# Patient Record
Sex: FEMALE | Race: WHITE | Employment: OTHER | URBAN - METROPOLITAN AREA
[De-identification: names, ages, dates, MRNs, and addresses within clinical notes are randomized per-mention and may not be internally consistent; named-entity substitution may affect disease eponyms.]

---

## 2017-11-28 ENCOUNTER — OFFICE VISIT (OUTPATIENT)
Dept: FAMILY MEDICINE CLINIC | Age: 82
End: 2017-11-28

## 2017-11-28 ENCOUNTER — HOSPITAL ENCOUNTER (OUTPATIENT)
Dept: LAB | Age: 82
Discharge: HOME | End: 2017-11-28
Payer: MEDICARE

## 2017-11-28 VITALS
OXYGEN SATURATION: 97 % | RESPIRATION RATE: 16 BRPM | WEIGHT: 153 LBS | HEART RATE: 64 BPM | HEIGHT: 65 IN | BODY MASS INDEX: 25.49 KG/M2 | DIASTOLIC BLOOD PRESSURE: 80 MMHG | SYSTOLIC BLOOD PRESSURE: 154 MMHG | TEMPERATURE: 97.9 F

## 2017-11-28 DIAGNOSIS — N32.81 OAB (OVERACTIVE BLADDER): ICD-10-CM

## 2017-11-28 DIAGNOSIS — R26.89 BALANCE PROBLEM: Primary | ICD-10-CM

## 2017-11-28 DIAGNOSIS — G89.29 CHRONIC FOOT PAIN, RIGHT: ICD-10-CM

## 2017-11-28 DIAGNOSIS — L43.8 ORAL LICHEN PLANUS: ICD-10-CM

## 2017-11-28 DIAGNOSIS — I10 ESSENTIAL HYPERTENSION: ICD-10-CM

## 2017-11-28 DIAGNOSIS — E03.9 ACQUIRED HYPOTHYROIDISM: ICD-10-CM

## 2017-11-28 DIAGNOSIS — M79.671 CHRONIC FOOT PAIN, RIGHT: ICD-10-CM

## 2017-11-28 DIAGNOSIS — F10.10 EXCESSIVE DRINKING ALCOHOL: ICD-10-CM

## 2017-11-28 DIAGNOSIS — B02.22 NEUROPATHIC POSTHERPETIC TRIGEMINAL NEURALGIA: ICD-10-CM

## 2017-11-28 DIAGNOSIS — M25.551 RIGHT HIP PAIN: ICD-10-CM

## 2017-11-28 DIAGNOSIS — Z79.890 POSTMENOPAUSAL HRT (HORMONE REPLACEMENT THERAPY): ICD-10-CM

## 2017-11-28 PROBLEM — G50.0 LEFT-SIDED TRIGEMINAL NEURALGIA: Status: ACTIVE | Noted: 2017-11-28

## 2017-11-28 PROBLEM — G50.0 LEFT-SIDED TRIGEMINAL NEURALGIA: Status: RESOLVED | Noted: 2017-11-28 | Resolved: 2017-11-28

## 2017-11-28 PROCEDURE — 84443 ASSAY THYROID STIM HORMONE: CPT

## 2017-11-28 PROCEDURE — 36415 COLL VENOUS BLD VENIPUNCTURE: CPT

## 2017-11-28 PROCEDURE — 80053 COMPREHEN METABOLIC PANEL: CPT

## 2017-11-28 PROCEDURE — 85025 COMPLETE CBC W/AUTO DIFF WBC: CPT

## 2017-11-28 PROCEDURE — 80061 LIPID PANEL: CPT

## 2017-11-28 RX ORDER — DOXYCYCLINE 100 MG/1
100 TABLET ORAL 2 TIMES DAILY
COMMUNITY
End: 2021-07-08

## 2017-11-28 RX ORDER — ASPIRIN 81 MG/1
TABLET ORAL DAILY
COMMUNITY
End: 2019-09-05

## 2017-11-28 RX ORDER — OXYBUTYNIN CHLORIDE 5 MG/1
5 TABLET ORAL
COMMUNITY
Start: 2017-11-28 | End: 2018-07-19 | Stop reason: SDUPTHER

## 2017-11-28 RX ORDER — LEVOTHYROXINE SODIUM 75 UG/1
TABLET ORAL
COMMUNITY
End: 2018-07-19 | Stop reason: SDUPTHER

## 2017-11-28 RX ORDER — OXYBUTYNIN CHLORIDE 5 MG/1
5 TABLET ORAL DAILY
COMMUNITY
End: 2017-11-28 | Stop reason: SDUPTHER

## 2017-11-28 RX ORDER — PROGESTERONE 200 MG/1
200 CAPSULE ORAL DAILY
COMMUNITY
End: 2018-05-16 | Stop reason: SDUPTHER

## 2017-11-28 RX ORDER — LISINOPRIL 2.5 MG/1
2.5 TABLET ORAL DAILY
COMMUNITY
End: 2018-07-19 | Stop reason: SDUPTHER

## 2017-11-28 RX ORDER — GABAPENTIN 300 MG/1
300 CAPSULE ORAL 3 TIMES DAILY
COMMUNITY
End: 2017-11-28 | Stop reason: DRUGHIGH

## 2017-11-28 RX ORDER — GABAPENTIN 100 MG/1
200 CAPSULE ORAL 3 TIMES DAILY
Qty: 720 CAP | Refills: 1 | Status: SHIPPED | OUTPATIENT
Start: 2017-11-28 | End: 2018-07-19 | Stop reason: SDUPTHER

## 2017-11-28 NOTE — PROGRESS NOTES
Jayme Treviño [address] [facility]  [address] [place], [address]  [phone]             Date of visit: 11/28/17   Subjective:      History obtained from:  Patient, daughter, Reggie Scott is a 80 y.o. female who presents today for new patient, just moved from [place]. [facility]. Brought in a sheet with a few concerns, although none of them new.  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, lidex and doxy work in 4 days    Trigeminal neuralgia, left side for 16-18 years, tolerable with gabapentin  Came from cold sore infection, not from in her head  Had been on lyrica made her groggy  Had been on neurontin    On hormones since menopause  Mother had hot flashes since age 76    Thyroid med for 12 years    On ditropan for OAB, has not tried taking it prn so not sure how that would go  Works well taking it once daily    prema lists of BP's and consistently 110-120s/50-60s at home  Always runs higher in doctors office      Patient Active Problem List    Diagnosis Date Noted    OAB (overactive bladder) 11/29/2017    Excessive drinking alcohol 11/29/2017    Postmenopausal HRT (hormone replacement therapy) 46/86/6152    Oral lichen planus 80/97/7844    Balance problem 11/28/2017    Right hip pain 11/28/2017    Chronic foot pain, right 11/28/2017    Neuropathic postherpetic trigeminal neuralgia 11/28/2017    Acquired hypothyroidism 11/28/2017    Essential hypertension 11/28/2017     Current Outpatient Prescriptions   Medication Sig Dispense Refill    levothyroxine (UNITHROID) 75 mcg tablet Take  by mouth Daily (before breakfast).  doxycycline (ADOXA) 100 mg tablet Take 100 mg by mouth two (2) times a day. AS NEEDED      lisinopril (PRINIVIL, ZESTRIL) 2.5 mg tablet Take 2.5 mg by mouth daily.  progesterone (PROMETRIUM) 200 mg capsule Take 200 mg by mouth daily.  conjugated estrogens (PREMARIN) 0.625 mg tablet Take 0.625 mg by mouth daily.  aspirin delayed-release 81 mg tablet Take  by mouth daily.  CALCIUM CARB/VIT D2/MINERALS (CALTRATE PLUS PO) Take 500 mg by mouth daily.  Omega-3 Fatty Acids (FISH OIL) 500 mg cap Take  by mouth.  gabapentin (NEURONTIN) 100 mg capsule Take 2 Caps by mouth three (3) times daily. Lower dose 720 Cap 1    oxybutynin (DITROPAN) 5 mg tablet Take 1 Tab by mouth daily as needed.        Allergies   Allergen Reactions    Voltaren [Diclofenac Sodium] Anaphylaxis    Toole Faywood Other (comments)    Neosporin [Benzalkonium Chloride] Rash     Past Medical History:   Diagnosis Date    Eczema     Hypertension 6145    Lichen planus     Oral    Trigeminal neuralgia      Past Surgical History:   Procedure Laterality Date    HX KNEE REPLACEMENT Right 2014    HX ROTATOR CUFF REPAIR Right      Family History   Problem Relation Age of Onset    Other Mother      severe hot flashes    Other Daughter      severe hot flashes     Social History   Substance Use Topics    Smoking status: Former Smoker    Smokeless tobacco: Never Used    Alcohol use 8.4 oz/week     8 Glasses of wine, 6 Shots of liquor per week      Social History     Social History Narrative    Lives in [facility], independent living    Daughter Quinton Roy lives nearby        Review of Systems  Card: denies chest pain  Pulm: denies shortness of breath   GI: denies hematochezia   denies urinary pain  Neuro: denies headaches  ENT denies congestion  MSK: admits to some joint problems, as above  Gen: denies fevers  Skin: denies rashes  Psych: denies depression/anxiety       Objective:     Vitals:    11/28/17 1513   BP: 154/80   Pulse: 64   Resp: 16   Temp: 97.9 °F (36.6 °C)   TempSrc: Oral   SpO2: 97%   Weight: 153 lb (69.4 kg)   Height: 5' 5.4\" (1.661 m)     Body mass index is 25.15 kg/(m^2). General: stated age, well-developed, and in NAD  Eyes: PERRL, EOMI, no redness or drainage  Nose: no drainage  Mouth: no lesions  Throat: no erythema, exudate or swelling  Neck: supple, symmetrical, trachea midline, no adenopathy and thyroid: not enlarged, symmetric, no tenderness/mass/nodules  Lungs:  clear to auscultation w/o rales, rhonchi, wheezes w/normal effort and no use of accessory muscles of respiration   Heart: regular rate and rhythm, S1, S2 normal, no murmur, click, rub or gallop  Abdomen: soft, nontender, no masses  Ext:  No edema noted. MSK: some difficulty standing and getting up on exam table, walks with a limp but able to do it all independently  Lymph: no cervical adenopathy appreciated  Skin:  Normal. and no rash or abnormalities   Neuro: CN 2-12 intact  Psych: alert and oriented to person, place, time and situation and Speech: appropriate quality, quantity and organization of sentences, normal affect    Assessment/Plan:       ICD-10-CM ICD-9-CM    1. Balance problem R26.89 781.99 REFERRAL TO PHYSICAL THERAPY   2. Oral lichen planus K46.3 953.6    3. Essential hypertension I10 401.9 CBC WITH AUTOMATED DIFF      METABOLIC PANEL, COMPREHENSIVE      LIPID PANEL   4. OAB (overactive bladder) N32.81 596.51    5. Acquired hypothyroidism E03.9 244.9 TSH 3RD GENERATION   6. Right hip pain M25.551 719.45 REFERRAL TO PHYSICAL THERAPY   7. Chronic foot pain, right M79.671 729.5 REFERRAL TO PHYSICAL THERAPY    G89.29 338.29    8. Neuropathic postherpetic trigeminal neuralgia B02.22 053.12    9. Excessive drinking alcohol F10.10 305.00    10.  Postmenopausal HRT (hormone replacement therapy) Z79.890 V07.4         Orders Placed This Encounter    TSH 3RD GENERATION    CBC WITH AUTOMATED DIFF    METABOLIC PANEL, COMPREHENSIVE    LIPID PANEL    REFERRAL TO PHYSICAL THERAPY    levothyroxine (UNITHROID) 75 mcg tablet    doxycycline (ADOXA) 100 mg tablet    DISCONTD: gabapentin (NEURONTIN) 300 mg capsule    DISCONTD: oxybutynin (DITROPAN) 5 mg tablet    lisinopril (PRINIVIL, ZESTRIL) 2.5 mg tablet    progesterone (PROMETRIUM) 200 mg capsule    conjugated estrogens (PREMARIN) 0.625 mg tablet    aspirin delayed-release 81 mg tablet    CALCIUM CARB/VIT D2/MINERALS (CALTRATE PLUS PO)    Omega-3 Fatty Acids (FISH OIL) 500 mg cap    gabapentin (NEURONTIN) 100 mg capsule    oxybutynin (DITROPAN) 5 mg tablet       Will refer for PT she can do where she lives for the balance problem and few chronic pains. Did not see until she left that she drinks about 2 drinks per day--definitely too much for a woman and francis at her age. Will address more after seeing labs. Might be affecting her balance. Has seen neuro about the facial pains which are doing well on neurontin  Will try lower dose to see if that also helps her balance  Also try using ditropan only prn instead of daily and see how that goes  May consider weaning hormones next time; she was nervous about doing that but has not tried in many years. Discussed their risks. Stable thyroid dose but check labs  Oral lichen planus does well with prn meds lidex and doxycycline, not having exacerbation now. Will request records sent  bp high here but perfect at home, no med changes    Discussed the diagnosis and plan and she expressed understanding. Follow-up Disposition:  Return in about 6 months (around 5/28/2018) for Follow up.     Daniel Munoz MD

## 2017-11-28 NOTE — PATIENT INSTRUCTIONS
You can use the oxybutinin AS NEEDED when you are going out  Be aware it can cause dry mouth, dizziness, or confusion    Let's cut your gabapentin down to 200mg 3x daily to lower your risks of falls                 Low Sodium Diet (2,000 Milligram): Care Instructions  Your Care Instructions    Too much sodium causes your body to hold on to extra water. This can raise your blood pressure and force your heart and kidneys to work harder. In very serious cases, this could cause you to be put in the hospital. It might even be life-threatening. By limiting sodium, you will feel better and lower your risk of serious problems. The most common source of sodium is salt. People get most of the salt in their diet from canned, prepared, and packaged foods. Fast food and restaurant meals also are very high in sodium. Your doctor will probably limit your sodium to less than 2,000 milligrams (mg) a day. This limit counts all the sodium in prepared and packaged foods and any salt you add to your food. Follow-up care is a key part of your treatment and safety. Be sure to make and go to all appointments, and call your doctor if you are having problems. It's also a good idea to know your test results and keep a list of the medicines you take. How can you care for yourself at home? Read food labels  · Read labels on cans and food packages. The labels tell you how much sodium is in each serving. Make sure that you look at the serving size. If you eat more than the serving size, you have eaten more sodium. · Food labels also tell you the Percent Daily Value for sodium. Choose products with low Percent Daily Values for sodium. · Be aware that sodium can come in forms other than salt, including monosodium glutamate (MSG), sodium citrate, and sodium bicarbonate (baking soda). MSG is often added to Asian food. When you eat out, you can sometimes ask for food without MSG or added salt.   Buy low-sodium foods  · Buy foods that are labeled \"unsalted\" (no salt added), \"sodium-free\" (less than 5 mg of sodium per serving), or \"low-sodium\" (less than 140 mg of sodium per serving). Foods labeled \"reduced-sodium\" and \"light sodium\" may still have too much sodium. Be sure to read the label to see how much sodium you are getting. · Buy fresh vegetables, or frozen vegetables without added sauces. Buy low-sodium versions of canned vegetables, soups, and other canned goods. Prepare low-sodium meals  · Cut back on the amount of salt you use in cooking. This will help you adjust to the taste. Do not add salt after cooking. One teaspoon of salt has about 2,300 mg of sodium. · Take the salt shaker off the table. · Flavor your food with garlic, lemon juice, onion, vinegar, herbs, and spices. Do not use soy sauce, lite soy sauce, steak sauce, onion salt, garlic salt, celery salt, mustard, or ketchup on your food. · Use low-sodium salad dressings, sauces, and ketchup. Or make your own salad dressings and sauces without adding salt. · Use less salt (or none) when recipes call for it. You can often use half the salt a recipe calls for without losing flavor. Other foods such as rice, pasta, and grains do not need added salt. · Rinse canned vegetables, and cook them in fresh water. This removes some-but not all-of the salt. · Avoid water that is naturally high in sodium or that has been treated with water softeners, which add sodium. Call your local water company to find out the sodium content of your water supply. If you buy bottled water, read the label and choose a sodium-free brand. Avoid high-sodium foods  · Avoid eating:  ¨ Smoked, cured, salted, and canned meat, fish, and poultry. ¨ Ham, dejesus, hot dogs, and luncheon meats. ¨ Regular, hard, and processed cheese and regular peanut butter. ¨ Crackers with salted tops, and other salted snack foods such as pretzels, chips, and salted popcorn. ¨ Frozen prepared meals, unless labeled low-sodium.   ¨ Canned and dried soups, broths, and bouillon, unless labeled sodium-free or low-sodium. ¨ Canned vegetables, unless labeled sodium-free or low-sodium. ¨ Elizabeth Jean Pierre fries, pizza, tacos, and other fast foods. ¨ Pickles, olives, ketchup, and other condiments, especially soy sauce, unless labeled sodium-free or low-sodium. Where can you learn more? Go to http://dez-cedric.info/. Enter P466 in the search box to learn more about \"Low Sodium Diet (2,000 Milligram): Care Instructions. \"  Current as of: May 12, 2017  Content Version: 11.4  © 3808-2127 Healthwise, [employer]. Care instructions adapted under license by [employer] (which disclaims liability or warranty for this information). If you have questions about a medical condition or this instruction, always ask your healthcare professional. [place] [address] any warranty or liability for your use of this information.

## 2017-11-28 NOTE — MR AVS SNAPSHOT
Visit Information Date & Time Provider Department Dept. Phone Encounter #  
 11/28/2017  3:00 PM Alex Bourgeois, [facility] [phone] [id number] Follow-up Instructions Return in about 6 months (around 5/28/2018). Upcoming Health Maintenance Date Due DTaP/Tdap/Td series (1 - Tdap) 5/2/1950 ZOSTER VACCINE AGE 60> 3/2/1989 GLAUCOMA SCREENING Q2Y 5/2/1994 OSTEOPOROSIS SCREENING (DEXA) 5/2/1994 Pneumococcal 65+ Low/Medium Risk (1 of 2 - PCV13) 5/2/1994 MEDICARE YEARLY EXAM 5/2/1994 Influenza Age 5 to Adult 8/1/2017 Allergies as of 11/28/2017  Review Complete On: 11/28/2017 By: Alex Bourgeois MD  
  
 Severity Noted Reaction Type Reactions Voltaren [Diclofenac Sodium] High 11/28/2017   Side Effect Anaphylaxis [place]  11/28/2017    Other (comments) Neosporin [Benzalkonium Chloride]  11/28/2017   Topical Rash Current Immunizations  Never Reviewed Name Date HPV 9/18/2017 Not reviewed this visit You Were Diagnosed With   
  
 Codes Comments Balance problem    -  Primary ICD-10-CM: R26.89 
ICD-9-CM: 781.99 Oral lichen planus     [facility]-12-HU: L43.8 ICD-9-CM: 697.0 Right hip pain     ICD-10-CM: M25.551 ICD-9-CM: 719.45 Chronic foot pain, right     ICD-10-CM: M79.671, G89.29 ICD-9-CM: 729.5, 338.29 Neuropathic postherpetic trigeminal neuralgia     ICD-10-CM: B02.22 
ICD-9-CM: 053.12 Acquired hypothyroidism     ICD-10-CM: E03.9 ICD-9-CM: 244.9 Essential hypertension     ICD-10-CM: I10 
ICD-9-CM: 401.9 Vitals BP Pulse Temp Resp Height(growth percentile) Weight(growth percentile) 154/80 64 97.9 °F (36.6 °C) (Oral) 16 5' 5.4\" (1.661 m) 153 lb (69.4 kg) SpO2 BMI Smoking Status 97% 25.15 kg/m2 Former Smoker Vitals History BMI and BSA Data Body Mass Index Body Surface Area  
 25.15 kg/m 2 1.79 m 2 Preferred Pharmacy Pharmacy Name [facility]  - [facility] - [phone], [phone] Your Updated Medication List  
  
   
This list is accurate as of: 11/28/17  4:26 PM.  Always use your most recent med list.  
  
  
  
  
 aspirin delayed-release 81 mg tablet Take  by mouth daily. CALTRATE PLUS PO Take 500 mg by mouth daily. conjugated estrogens 0.625 mg tablet Commonly known as:  PREMARIN Take 0.625 mg by mouth daily. doxycycline 100 mg tablet Commonly known as:  ADOXA Take 100 mg by mouth two (2) times a day. AS NEEDED FISH  mg Cap Generic drug:  Omega-3 Fatty Acids Take  by mouth.  
  
 gabapentin 300 mg capsule Commonly known as:  NEURONTIN Take 300 mg by mouth three (3) times daily. lisinopril 2.5 mg tablet Commonly known as:  Anjana Jhony Take 2.5 mg by mouth daily. oxybutynin 5 mg tablet Commonly known as:  BRCBLXCE Take 5 mg by mouth daily. PROMETRIUM 200 mg capsule Generic drug:  progesterone Take 200 mg by mouth daily. UNITHROID 75 mcg tablet Generic drug:  levothyroxine Take  by mouth Daily (before breakfast). We Performed the Following CBC WITH AUTOMATED DIFF [84264 CPT(R)] LIPID PANEL [33873 CPT(R)] METABOLIC PANEL, COMPREHENSIVE [93317 CPT(R)] REFERRAL TO PHYSICAL THERAPY [GZN36 Custom] Comments:  
 Has physical therapy Vipin at [facility] where she lives ([facility]) TSH 3RD GENERATION [01553 CPT(R)] Follow-up Instructions Return in about 6 months (around 5/28/2018). Referral Information Referral ID Referred By Referred To  
  
 [id number] Shaniqua Singer Not Available Visits Status Start Date End Date 1 New Request 11/28/17 11/28/18 If your referral has a status of pending review or denied, additional information will be sent to support the outcome of this decision. Patient Instructions You can use the oxybutinin AS NEEDED when you are going out Be aware it can cause dry mouth, dizziness, or confusion Let's cut your gabapentin down to 200mg 3x daily to lower your risks of falls Low Sodium Diet (2,000 Milligram): Care Instructions Your Care Instructions Too much sodium causes your body to hold on to extra water. This can raise your blood pressure and force your heart and kidneys to work harder. In very serious cases, this could cause you to be put in the hospital. It might even be life-threatening. By limiting sodium, you will feel better and lower your risk of serious problems. The most common source of sodium is salt. People get most of the salt in their diet from canned, prepared, and packaged foods. Fast food and restaurant meals also are very high in sodium. Your doctor will probably limit your sodium to less than 2,000 milligrams (mg) a day. This limit counts all the sodium in prepared and packaged foods and any salt you add to your food. Follow-up care is a key part of your treatment and safety. Be sure to make and go to all appointments, and call your doctor if you are having problems. It's also a good idea to know your test results and keep a list of the medicines you take. How can you care for yourself at home? Read food labels · Read labels on cans and food packages. The labels tell you how much sodium is in each serving. Make sure that you look at the serving size. If you eat more than the serving size, you have eaten more sodium. · Food labels also tell you the Percent Daily Value for sodium. Choose products with low Percent Daily Values for sodium. · Be aware that sodium can come in forms other than salt, including monosodium glutamate (MSG), sodium citrate, and sodium bicarbonate (baking soda). MSG is often added to Asian food. When you eat out, you can sometimes ask for food without MSG or added salt. Buy low-sodium foods · Buy foods that are labeled \"unsalted\" (no salt added), \"sodium-free\" (less than 5 mg of sodium per serving), or \"low-sodium\" (less than 140 mg of sodium per serving). Foods labeled \"reduced-sodium\" and \"light sodium\" may still have too much sodium. Be sure to read the label to see how much sodium you are getting. · Buy fresh vegetables, or frozen vegetables without added sauces. Buy low-sodium versions of canned vegetables, soups, and other canned goods. Prepare low-sodium meals · Cut back on the amount of salt you use in cooking. This will help you adjust to the taste. Do not add salt after cooking. One teaspoon of salt has about 2,300 mg of sodium. · Take the salt shaker off the table. · Flavor your food with garlic, lemon juice, onion, vinegar, herbs, and spices. Do not use soy sauce, lite soy sauce, steak sauce, onion salt, garlic salt, celery salt, mustard, or ketchup on your food. · Use low-sodium salad dressings, sauces, and ketchup. Or make your own salad dressings and sauces without adding salt. · Use less salt (or none) when recipes call for it. You can often use half the salt a recipe calls for without losing flavor. Other foods such as rice, pasta, and grains do not need added salt. · Rinse canned vegetables, and cook them in fresh water. This removes some-but not all-of the salt. · Avoid water that is naturally high in sodium or that has been treated with water softeners, which add sodium. Call your local water company to find out the sodium content of your water supply. If you buy bottled water, read the label and choose a sodium-free brand. Avoid high-sodium foods · Avoid eating: ¨ Smoked, cured, salted, and canned meat, fish, and poultry. ¨ Ham, dejesus, hot dogs, and luncheon meats. ¨ Regular, hard, and processed cheese and regular peanut butter. ¨ Crackers with salted tops, and other salted snack foods such as pretzels, chips, and salted popcorn. ¨ Frozen prepared meals, unless labeled low-sodium. ¨ Canned and dried soups, broths, and bouillon, unless labeled sodium-free or low-sodium. ¨ Canned vegetables, unless labeled sodium-free or low-sodium. ¨ [place] fries, pizza, tacos, and other fast foods. ¨ Pickles, olives, ketchup, and other condiments, especially soy sauce, unless labeled sodium-free or low-sodium. Where can you learn more? Go to http://dez-cerdic.info/. Enter U802 in the search box to learn more about \"Low Sodium Diet (2,000 Milligram): Care Instructions. \" Current as of: May 12, 2017 Content Version: 11.4 © 5627-7732 [employer]. Care instructions adapted under license by [employer] (which disclaims liability or warranty for this information). If you have questions about a medical condition or this instruction, always ask your healthcare professional. [place] [address] any warranty or liability for your use of this information. Introducing [facility]! Kristine Carbajal introduces Medityplus patient portal. Now you can access parts of your medical record, email your doctor's office, and request medication refills online. 1. In your internet browser, go to [email or web address] 2. Click on the First Time User? Click Here link in the Sign In box. You will see the New Member Sign Up page. 3. Enter your Medityplus Access Code exactly as it appears below. You will not need to use this code after youve completed the sign-up process. If you do not sign up before the expiration date, you must request a new code. · Medityplus Access Code: [email or web address] Expires: 2/26/2018  3:38 PM 
 
4. Enter the last four digits of your Social Security Number (xxxx) and Date of Birth (mm/dd/yyyy) as indicated and click Submit. You will be taken to the next sign-up page. 5. Create a Medityplus ID.  This will be your Medityplus login ID and cannot be changed, so think of one that is secure and easy to remember. 6. Create a Popset password. You can change your password at any time. 7. Enter your Password Reset Question and Answer. This can be used at a later time if you forget your password. 8. Enter your e-mail address. You will receive e-mail notification when new information is available in [address] 9. Click Sign Up. You can now view and download portions of your medical record. 10. Click the Download Summary menu link to download a portable copy of your medical information. If you have questions, please visit the Frequently Asked Questions section of the Popset website. Remember, Popset is NOT to be used for urgent needs. For medical emergencies, dial 911. Now available from your iPhone and Android! Please provide this summary of care documentation to your next provider. Your primary care clinician is listed as Krystle Larose. If you have any questions after today's visit, please call [phone].

## 2017-11-28 NOTE — LETTER
11/28/2017 4:31 PM 
 
Ms. Norma Parker [address] [place] [id number] Current Outpatient Prescriptions:  
  levothyroxine (UNITHROID) 75 mcg tablet, Take  by mouth Daily (before breakfast). , Disp: , Rfl:  
  doxycycline (ADOXA) 100 mg tablet, Take 100 mg by mouth two (2) times a day. AS NEEDED, Disp: , Rfl:  
  lisinopril (PRINIVIL, ZESTRIL) 2.5 mg tablet, Take 2.5 mg by mouth daily. , Disp: , Rfl:  
  progesterone (PROMETRIUM) 200 mg capsule, Take 200 mg by mouth daily. , Disp: , Rfl:  
  conjugated estrogens (PREMARIN) 0.625 mg tablet, Take 0.625 mg by mouth daily. , Disp: , Rfl:  
  aspirin delayed-release 81 mg tablet, Take  by mouth daily. , Disp: , Rfl:  
  CALCIUM CARB/VIT D2/MINERALS (CALTRATE PLUS PO), Take 500 mg by mouth daily. , Disp: , Rfl:  
  Omega-3 Fatty Acids (FISH OIL) 500 mg cap, Take  by mouth., Disp: , Rfl:  
  gabapentin (NEURONTIN) 100 mg capsule, Take 2 Caps by mouth three (3) times daily. Lower dose, Disp: 720 Cap, Rfl: 1 
  oxybutynin (DITROPAN) 5 mg tablet, Take 1 Tab by mouth daily as needed. , Disp: , Rfl:  
 
 
 
Sincerely, Renée Person MD

## 2017-11-28 NOTE — PROGRESS NOTES
Pt presents to office today with her daughter Nicolasa Sheehan to establish care. Pt new to area. Chief Complaint   Patient presents with   [employer] Establish Care     Pt new to area. 1. Have you been to the ER, urgent care clinic since your last visit? Hospitalized since your last visit? No    2. Have you seen or consulted any other health care providers outside of the [address] [place] [address] since your last visit? Include any pap smears or colon screening.  No

## 2017-11-29 PROBLEM — N32.81 OAB (OVERACTIVE BLADDER): Status: ACTIVE | Noted: 2017-11-29

## 2017-11-29 PROBLEM — Z79.890 POSTMENOPAUSAL HRT (HORMONE REPLACEMENT THERAPY): Status: ACTIVE | Noted: 2017-11-29

## 2017-11-29 PROBLEM — F10.10 EXCESSIVE DRINKING ALCOHOL: Status: ACTIVE | Noted: 2017-11-29

## 2017-11-29 LAB
ALBUMIN SERPL-MCNC: 4.1 G/DL (ref 3.5–4.7)
ALBUMIN/GLOB SERPL: 1.5 {RATIO} (ref 1.2–2.2)
ALP SERPL-CCNC: 76 IU/L (ref 39–117)
ALT SERPL-CCNC: 10 IU/L (ref 0–32)
AST SERPL-CCNC: 15 IU/L (ref 0–40)
BASOPHILS # BLD AUTO: 0.1 X10E3/UL (ref 0–0.2)
BASOPHILS NFR BLD AUTO: 1 %
BILIRUB SERPL-MCNC: 0.3 MG/DL (ref 0–1.2)
BUN SERPL-MCNC: 19 MG/DL (ref 8–27)
BUN/CREAT SERPL: 18 (ref 12–28)
CALCIUM SERPL-MCNC: 9.7 MG/DL (ref 8.7–10.3)
CHLORIDE SERPL-SCNC: 102 MMOL/L (ref 96–106)
CHOLEST SERPL-MCNC: 279 MG/DL (ref 100–199)
CO2 SERPL-SCNC: 21 MMOL/L (ref 18–29)
CREAT SERPL-MCNC: 1.08 MG/DL (ref 0.57–1)
EOSINOPHIL # BLD AUTO: 0.2 X10E3/UL (ref 0–0.4)
EOSINOPHIL NFR BLD AUTO: 2 %
ERYTHROCYTE [DISTWIDTH] IN BLOOD BY AUTOMATED COUNT: 13.9 % (ref 12.3–15.4)
GFR SERPLBLD CREATININE-BSD FMLA CKD-EPI: 46 ML/MIN/1.73
GFR SERPLBLD CREATININE-BSD FMLA CKD-EPI: 53 ML/MIN/1.73
GLOBULIN SER CALC-MCNC: 2.7 G/DL (ref 1.5–4.5)
GLUCOSE SERPL-MCNC: 80 MG/DL (ref 65–99)
HCT VFR BLD AUTO: 41.4 % (ref 34–46.6)
HDLC SERPL-MCNC: 99 MG/DL
HGB BLD-MCNC: 14.1 G/DL (ref 11.1–15.9)
IMM GRANULOCYTES # BLD: 0 X10E3/UL (ref 0–0.1)
IMM GRANULOCYTES NFR BLD: 0 %
INTERPRETATION, 910389: NORMAL
INTERPRETATION: NORMAL
LDLC SERPL CALC-MCNC: 152 MG/DL (ref 0–99)
LYMPHOCYTES # BLD AUTO: 1.4 X10E3/UL (ref 0.7–3.1)
LYMPHOCYTES NFR BLD AUTO: 20 %
MCH RBC QN AUTO: 30.8 PG (ref 26.6–33)
MCHC RBC AUTO-ENTMCNC: 34.1 G/DL (ref 31.5–35.7)
MCV RBC AUTO: 90 FL (ref 79–97)
MONOCYTES # BLD AUTO: 0.6 X10E3/UL (ref 0.1–0.9)
MONOCYTES NFR BLD AUTO: 9 %
NEUTROPHILS # BLD AUTO: 4.8 X10E3/UL (ref 1.4–7)
NEUTROPHILS NFR BLD AUTO: 68 %
PDF IMAGE, 910387: NORMAL
PLATELET # BLD AUTO: 304 X10E3/UL (ref 150–379)
POTASSIUM SERPL-SCNC: 4.5 MMOL/L (ref 3.5–5.2)
PROT SERPL-MCNC: 6.8 G/DL (ref 6–8.5)
RBC # BLD AUTO: 4.58 X10E6/UL (ref 3.77–5.28)
SODIUM SERPL-SCNC: 140 MMOL/L (ref 134–144)
TRIGL SERPL-MCNC: 138 MG/DL (ref 0–149)
TSH SERPL DL<=0.005 MIU/L-ACNC: 0.8 UIU/ML (ref 0.45–4.5)
VLDLC SERPL CALC-MCNC: 28 MG/DL (ref 5–40)
WBC # BLD AUTO: 7 X10E3/UL (ref 3.4–10.8)

## 2017-11-29 NOTE — PROGRESS NOTES
Sent in letter: Your blood tests were great, including thyroid, liver, electrolytes, and blood counts. Your kidney lab (creatinine) is slightly elevated but not bad for your age. I suspect it has been this way for a while but will compare it to your older labs when the records are sent. I noticed after you left that my nurse recorded you drank about 14 alcoholic drinks per week. I would suggest you cut back to perhaps 1/2 drink per day, since you are having some problems with your balance. Also, more than 7 drinks per week is too much for your liver. Just a suggestion! You are doing very well overall! !!

## 2017-11-30 ENCOUNTER — TELEPHONE (OUTPATIENT)
Dept: FAMILY MEDICINE CLINIC | Age: 82
End: 2017-11-30

## 2017-11-30 NOTE — TELEPHONE ENCOUNTER
Clarence Ferrara calling from [facility] wanted to notify Dr. Essie Rainey that there is a delay at patients request regarding appt.      Best phone [phone]  11/30/17  Heriberto Benton

## 2017-12-20 ENCOUNTER — TELEPHONE (OUTPATIENT)
Dept: FAMILY MEDICINE CLINIC | Age: 82
End: 2017-12-20

## 2017-12-20 NOTE — TELEPHONE ENCOUNTER
ALEA for return call. Kaleigh Friday called back and states that they still need to work a little longer on pt's strength and gait. Advised that it is ok to extend PT as needed.

## 2017-12-20 NOTE — TELEPHONE ENCOUNTER
----- Message from Godwin Hanks sent at 12/20/2017 10:34 AM EST -----  Regarding: Dr. Tristan Ray from [place] [address] a physical therapist is requesting a verbal order to continue physical therapy. His number [phone]. The patient's number is [phone].

## 2018-01-03 ENCOUNTER — TELEPHONE (OUTPATIENT)
Dept: FAMILY MEDICINE CLINIC | Age: 83
End: 2018-01-03

## 2018-01-03 NOTE — TELEPHONE ENCOUNTER
----- Message from Candace Hsieh sent at 1/3/2018  3:13 PM EST -----  Regarding: Dr. Shree Camilo / telephone  [facility] with [facility] health  is requesting a return call fr0m the office and best contact number is [phone]

## 2018-01-16 ENCOUNTER — OFFICE VISIT (OUTPATIENT)
Dept: FAMILY MEDICINE CLINIC | Age: 83
End: 2018-01-16

## 2018-01-16 VITALS
BODY MASS INDEX: 25.12 KG/M2 | SYSTOLIC BLOOD PRESSURE: 156 MMHG | OXYGEN SATURATION: 97 % | HEIGHT: 65 IN | TEMPERATURE: 97.8 F | DIASTOLIC BLOOD PRESSURE: 82 MMHG | HEART RATE: 77 BPM | RESPIRATION RATE: 18 BRPM | WEIGHT: 150.8 LBS

## 2018-01-16 DIAGNOSIS — L20.9 ATOPIC DERMATITIS, UNSPECIFIED TYPE: ICD-10-CM

## 2018-01-16 DIAGNOSIS — E28.39 ESTROGEN DEFICIENCY: ICD-10-CM

## 2018-01-16 DIAGNOSIS — G89.29 CHRONIC LEFT SHOULDER PAIN: ICD-10-CM

## 2018-01-16 DIAGNOSIS — M25.561 CHRONIC PAIN OF RIGHT KNEE: ICD-10-CM

## 2018-01-16 DIAGNOSIS — I10 ESSENTIAL HYPERTENSION: ICD-10-CM

## 2018-01-16 DIAGNOSIS — R26.89 BALANCE PROBLEM: ICD-10-CM

## 2018-01-16 DIAGNOSIS — M25.512 CHRONIC LEFT SHOULDER PAIN: ICD-10-CM

## 2018-01-16 DIAGNOSIS — Z00.00 MEDICARE ANNUAL WELLNESS VISIT, SUBSEQUENT: Primary | ICD-10-CM

## 2018-01-16 DIAGNOSIS — L65.8 FEMALE PATTERN HAIR LOSS: ICD-10-CM

## 2018-01-16 DIAGNOSIS — G89.29 CHRONIC PAIN OF RIGHT KNEE: ICD-10-CM

## 2018-01-16 NOTE — PROGRESS NOTES
Chief Complaint   Patient presents with    Hypertension    Other     follow up balance, states is better       Reviewed Record in preparation for visit and have obtained necessary documentation. Identified pt with two pt identifiers (Name @ )    Health Maintenance Due   Topic    DTaP/Tdap/Td series (1 - Tdap)    ZOSTER VACCINE AGE 60>     GLAUCOMA SCREENING Q2Y     OSTEOPOROSIS SCREENING (DEXA)     Pneumococcal 65+ Low/Medium Risk (1 of 2 - PCV13)    MEDICARE YEARLY EXAM          1. Have you been to the ER, urgent care clinic since your last visit? Hospitalized since your last visit? No

## 2018-01-16 NOTE — ACP (ADVANCE CARE PLANNING)
Discussed with Allison Quintero her ability to prepare and advance directive in the case that an injury or illness causes her to be unable to make health care decisions.    Has one, will bring it by

## 2018-01-16 NOTE — PATIENT INSTRUCTIONS
Try eucerin cream for your back to prevent eczema    Ok to stop the calcium for now if you like    Ok to wean the hormones when you feel ready by doing the following:  Month 1: skip Sundays  [facility] 2: skip Sundays and thursday  Month 3: skip Sunday, Tuesday, Thursday  Month 4: Skip Saturday, Sunday, Tuesday, and Thursday  Month 5: skip 5 days per week  Month 6: skip 6 days per week  Month 7: stop all                 Atopic Dermatitis: Care Instructions  Your Care Instructions  Atopic dermatitis (also called eczema) is a skin problem that causes intense itching and a red, raised rash. In severe cases, the rash develops clear fluid-filled blisters. The rash is not contagious. People with this condition seem to have very sensitive immune systems that are likely to react to things that cause allergies. The immune system is the body's way of fighting infection. There is no cure for atopic dermatitis, but you may be able to control it with care at home. Follow-up care is a key part of your treatment and safety. Be sure to make and go to all appointments, and call your doctor if you are having problems. It's also a good idea to know your test results and keep a list of the medicines you take. How can you care for yourself at home? · Use moisturizer at least twice a day. · If your doctor prescribes a cream, use it as directed. If your doctor prescribes other medicine, take it exactly as directed. · Wash the affected area with water only. Soap can make dryness and itching worse. Pat dry. · Apply a moisturizer after bathing. Use a cream such as Lubriderm, Moisturel, or Cetaphil that does not irritate the skin or cause a rash. Apply the cream while your skin is still damp after lightly drying with a towel. · Use cold, wet cloths to reduce itching. · Keep cool, and stay out of the sun.   · If itching affects your normal activities, an over-the-counter antihistamine, such as diphenhydramine (Benadryl) or loratadine (Claritin) may help. Read and follow all instructions on the label. When should you call for help? Call your doctor now or seek immediate medical care if:  ? · Your rash gets worse and you have a fever. ? · You have new blisters or bruises, or the rash spreads and looks like a sunburn. ? · You have signs of infection, such as:  ¨ Increased pain, swelling, warmth, or redness. ¨ Red streaks leading from the rash. ¨ Pus draining from the rash. ¨ A fever. ? · You have crusting or oozing sores. ? · You have joint aches or body aches along with your rash. ? Watch closely for changes in your health, and be sure to contact your doctor if:  ? · Your rash does not clear up after 2 to 3 weeks of home treatment. ? · Itching interferes with your sleep or daily activities. Where can you learn more? Go to http://dez-cedric.info/. Enter B636 in the search box to learn more about \"Atopic Dermatitis: Care Instructions. \"  Current as of: October 13, 2016  Content Version: 11.4  © 1471-7781 [employer]. Care instructions adapted under license by [employer] (which disclaims liability or warranty for this information). If you have questions about a medical condition or this instruction, always ask your healthcare professional. [place] [address] any warranty or liability for your use of this information.       Health Maintenance   Topic Date Due    DTaP/Tdap/Td series (1 - Tdap) 05/02/1950    ZOSTER VACCINE AGE 60>  03/02/1989    GLAUCOMA SCREENING Q2Y  05/02/1994    OSTEOPOROSIS SCREENING (DEXA)  05/02/1994    Pneumococcal 65+ Low/Medium Risk (1 of 2 - PCV13) 05/02/1994    MEDICARE YEARLY EXAM  05/02/1994    Influenza Age 9 to Adult  Completed      (Preventive care checklist to be included in patient instructions)  Discussed today Done Previously Not Needed     X both  Pneumococcal vaccines    x  Flu vaccine     x Hepatitis B vaccine (if at risk)   X Shingrix X Zostavax  Shingles vaccine   x   TDAP vaccine    x  Mammogram     x Pap smear     x Colorectal cancer screening     x Low-dose CT for lung cancer screening   x   Bone density test    X recently  Glaucoma screening    x  Cholesterol test    x  Diabetes screening test      x Diabetes self-management class     x Nutritionist referral for diabetes or renal disease        Well Visit, Over 65: Care Instructions  Your Care Instructions    Physical exams can help you stay healthy. Your doctor has checked your overall health and may have suggested ways to take good care of yourself. He or she also may have recommended tests. At home, you can help prevent illness with healthy eating, regular exercise, and other steps. Follow-up care is a key part of your treatment and safety. Be sure to make and go to all appointments, and call your doctor if you are having problems. It's also a good idea to know your test results and keep a list of the medicines you take. How can you care for yourself at home? · Reach and stay at a healthy weight. This will lower your risk for many problems, such as obesity, diabetes, heart disease, and high blood pressure. · Get at least 30 minutes of exercise on most days of the week. Walking is a good choice. You also may want to do other activities, such as running, swimming, cycling, or playing tennis or team sports. · Do not smoke. Smoking can make health problems worse. If you need help quitting, talk to your doctor about stop-smoking programs and medicines. These can increase your chances of quitting for good. · Protect your skin from too much sun. When you're outdoors from 10 a.m. to 4 p.m., stay in the shade or cover up with clothing and a hat with a wide brim. Wear sunglasses that block UV rays. Even when it's cloudy, put broad-spectrum sunscreen (SPF 30 or higher) on any exposed skin. · See a dentist one or two times a year for checkups and to have your teeth cleaned.   · Wear a seat belt in the car. · Limit alcohol to 2 drinks a day for men and 1 drink a day for women. Too much alcohol can cause health problems. Follow your doctor's advice about when to have certain tests. These tests can spot problems early. For men and women  · Cholesterol. Your doctor will tell you how often to have this done based on your overall health and other things that can increase your risk for heart attack and stroke. · Blood pressure. Have your blood pressure checked during a routine doctor visit. Your doctor will tell you how often to check your blood pressure based on your age, your blood pressure results, and other factors. · Diabetes. Ask your doctor whether you should have tests for diabetes. · Vision. Experts recommend that you have yearly exams for glaucoma and other age-related eye problems. · Hearing. Tell your doctor if you notice any change in your hearing. You can have tests to find out how well you hear. · Colon cancer tests. Keep having colon cancer tests as your doctor recommends. You can have one of several types of tests. · Heart attack and stroke risk. At least every 4 to 6 years, you should have your risk for heart attack and stroke assessed. Your doctor uses factors such as your age, blood pressure, cholesterol, and whether you smoke or have diabetes to show what your risk for a heart attack or stroke is over the next 10 years. · Osteoporosis. Talk to your doctor about whether you should have a bone density test to find out whether you have thinning bones. Also ask your doctor about whether you should take calcium and vitamin D supplements. For women  · Pap test and pelvic exam. You may no longer need a Pap test. Talk with your doctor about whether to stop or continue to have Pap tests. · Breast exam and mammogram. Ask how often you should have a mammogram, which is an X-ray of your breasts.  A mammogram can spot breast cancer before it can be felt and when it is easiest to treat.  · Thyroid disease. Talk to your doctor about whether to have your thyroid checked as part of a regular physical exam. Women have an increased chance of a thyroid problem. For men  · Prostate exam. Talk to your doctor about whether you should have a blood test (called a PSA test) for prostate cancer. Experts disagree on whether men should have this test. Some experts recommend that you discuss the benefits and risks of the test with your doctor. · Abdominal aortic aneurysm. Ask your doctor whether you should have a test to check for an aneurysm. You may need a test if you ever smoked or if your parent, brother, sister, or child has had an aneurysm. When should you call for help? Watch closely for changes in your health, and be sure to contact your doctor if you have any problems or symptoms that concern you. Where can you learn more? Go to http://dez-cedric.info/. Enter S267 in the search box to learn more about \"Well Visit, Over 65: Care Instructions. \"  Current as of: May 12, 2017  Content Version: 11.4  © 8377-3885 [employer]. Care instructions adapted under license by [employer] (which disclaims liability or warranty for this information). If you have questions about a medical condition or this instruction, always ask your healthcare professional. [address] any warranty or liability for your use of this information.

## 2018-01-16 NOTE — MR AVS SNAPSHOT
[address] [place] [address] 
[phone] Patient: Shelley Huston MRN: [id number] AGW:[birth year] Visit Information Date & Time Provider Department Dept. Phone Encounter #  
 1/16/2018  2:45 PM Katerine Lozano, [address] [facility] [phone] [id number] Follow-up Instructions Return in about 6 months (around 7/16/2018) for Follow up. Upcoming Health Maintenance Date Due DTaP/Tdap/Td series (1 - Tdap) 5/2/1950 ZOSTER VACCINE AGE 60> 3/2/1989 GLAUCOMA SCREENING Q2Y 5/2/1994 OSTEOPOROSIS SCREENING (DEXA) 5/2/1994 Pneumococcal 65+ Low/Medium Risk (1 of 2 - PCV13) 5/2/1994 MEDICARE YEARLY EXAM 5/2/1994 Allergies as of 1/16/2018  Review Complete On: 1/16/2018 By: Katerine Lozano MD  
  
 Severity Noted Reaction Type Reactions Voltaren [Diclofenac Sodium] High 11/28/2017   Side Effect Anaphylaxis [place]  11/28/2017    Other (comments) Neosporin [Benzalkonium Chloride]  11/28/2017   Topical Rash Current Immunizations  Reviewed on 1/11/2018 Name Date HPV 9/18/2017 Influenza Vaccine 9/18/2017 Not reviewed this visit You Were Diagnosed With   
  
 Codes Comments Medicare annual wellness visit, subsequent    -  Primary ICD-10-CM: Z00.00 ICD-9-CM: V70.0 Balance problem     ICD-10-CM: R26.89 
ICD-9-CM: 781.99 Essential hypertension     ICD-10-CM: I10 
ICD-9-CM: 401.9 Atopic dermatitis, unspecified type     ICD-10-CM: L20.9 ICD-9-CM: 691.8 Chronic left shoulder pain     ICD-10-CM: M25.512, G89.29 ICD-9-CM: 719.41, 338.29 Estrogen deficiency     ICD-10-CM: E28.39 
ICD-9-CM: 256.39 Female pattern hair loss     ICD-10-CM: L65.8 ICD-9-CM: 704.09 Chronic pain of right knee     ICD-10-CM: M25.561, G89.29 ICD-9-CM: 719.46, 338.29 Vitals BP Pulse Temp Resp Height(growth percentile) Weight(growth percentile) 156/82 (BP 1 Location: Right arm, BP Patient Position: Sitting) 77 97.8 °F (36.6 °C) (Oral) 18 5' 5.4\" (1.661 m) 150 lb 12.8 oz (68.4 kg) SpO2 BMI OB Status Smoking Status 97% 24.79 kg/m2 Postmenopausal Former Smoker Vitals History BMI and BSA Data Body Mass Index Body Surface Area 24.79 kg/m 2 1.78 m 2 Preferred Pharmacy Pharmacy Name Phone [id number] [address] [phone] Your Updated Medication List  
  
   
This list is accurate as of: 1/16/18  3:31 PM.  Always use your most recent med list.  
  
  
  
  
 aspirin delayed-release 81 mg tablet Take  by mouth daily. CALTRATE PLUS PO Take 500 mg by mouth daily. conjugated estrogens 0.625 mg tablet Commonly known as:  PREMARIN Take 0.625 mg by mouth daily. doxycycline 100 mg tablet Commonly known as:  ADOXA Take 100 mg by mouth two (2) times a day. AS NEEDED FISH  mg Cap Generic drug:  Omega-3 Fatty Acids Take  by mouth.  
  
 gabapentin 100 mg capsule Commonly known as:  NEURONTIN Take 2 Caps by mouth three (3) times daily. Lower dose  
  
 lisinopril 2.5 mg tablet Commonly known as:  Deedee Hummer Take 2.5 mg by mouth daily. oxybutynin 5 mg tablet Commonly known as:  HFNJMKCI Take 1 Tab by mouth daily as needed. PROMETRIUM 200 mg capsule Generic drug:  progesterone Take 200 mg by mouth daily. UNITHROID 75 mcg tablet Generic drug:  levothyroxine Take  by mouth Daily (before breakfast). We Performed the Following REFERRAL TO PHYSICAL THERAPY [WCQ99 Custom] Comments:  
 Needs more physical therapy to help right knee and balance, plans to do outside of [facility] Follow-up Instructions Return in about 6 months (around 7/16/2018) for Follow up. To-Do List   
 01/16/2018 Imaging:  DEXA BONE DENSITY STUDY AXIAL Referral Information Referral ID Referred By Referred To  
  
 [id number] Jacqueline Gao Not Available Visits Status Start Date End Date 1 New Request 1/16/18 1/16/19 If your referral has a status of pending review or denied, additional information will be sent to support the outcome of this decision. Patient Instructions Try eucerin cream for your back to prevent asthma Ok to stop the calcium for now if you like Ok to wean the hormones when you feel ready by doing the following: 
Month 1: skip Sundays Johnny 2: skip Sundays and thursday Month 3: skip Sunday, Tuesday, Thursday Month 4: Skip Saturday, Sunday, Tuesday, and Thursday Month 5: skip 5 days per week Month 6: skip 6 days per week Month 7: stop all Atopic Dermatitis: Care Instructions Your Care Instructions Atopic dermatitis (also called eczema) is a skin problem that causes intense itching and a red, raised rash. In severe cases, the rash develops clear fluid-filled blisters. The rash is not contagious. People with this condition seem to have very sensitive immune systems that are likely to react to things that cause allergies. The immune system is the body's way of fighting infection. There is no cure for atopic dermatitis, but you may be able to control it with care at home. Follow-up care is a key part of your treatment and safety. Be sure to make and go to all appointments, and call your doctor if you are having problems. It's also a good idea to know your test results and keep a list of the medicines you take. How can you care for yourself at home? · Use moisturizer at least twice a day. · If your doctor prescribes a cream, use it as directed. If your doctor prescribes other medicine, take it exactly as directed. · Wash the affected area with water only. Soap can make dryness and itching worse. Pat dry. · Apply a moisturizer after bathing.  Use a cream such as Lubriderm, Moisturel, or Cetaphil that does not irritate the skin or cause a rash. Apply the cream while your skin is still damp after lightly drying with a towel. · Use cold, wet cloths to reduce itching. · Keep cool, and stay out of the sun. · If itching affects your normal activities, an over-the-counter antihistamine, such as diphenhydramine (Benadryl) or loratadine (Claritin) may help. Read and follow all instructions on the label. When should you call for help? Call your doctor now or seek immediate medical care if: 
? · Your rash gets worse and you have a fever. ? · You have new blisters or bruises, or the rash spreads and looks like a sunburn. ? · You have signs of infection, such as: 
¨ Increased pain, swelling, warmth, or redness. ¨ Red streaks leading from the rash. ¨ Pus draining from the rash. ¨ A fever. ? · You have crusting or oozing sores. ? · You have joint aches or body aches along with your rash. ? Watch closely for changes in your health, and be sure to contact your doctor if: 
? · Your rash does not clear up after 2 to 3 weeks of home treatment. ? · Itching interferes with your sleep or daily activities. Where can you learn more? Go to http://dez-cedric.info/. Enter G309 in the search box to learn more about \"Atopic Dermatitis: Care Instructions. \" Current as of: October 13, 2016 Content Version: 11.4 © 7704-0130 [employer] Care instructions adapted under license by [employer] (which disclaims liability or warranty for this information). If you have questions about a medical condition or this instruction, always ask your healthcare professional. [place] [address] any warranty or liability for your use of this information. Health Maintenance Topic Date Due  
 DTaP/Tdap/Td series (1 - Tdap) 05/02/1950  ZOSTER VACCINE AGE 60>  03/02/1989  GLAUCOMA SCREENING Q2Y  05/02/1994  OSTEOPOROSIS SCREENING (DEXA)  05/02/1994  Pneumococcal 65+ Low/Medium Risk (1 of 2 - PCV13) 05/02/1994  MEDICARE YEARLY EXAM  05/02/1994  Influenza Age 5 to Adult  Completed (Preventive care checklist to be included in patient instructions) Discussed today Done Previously Not Needed X both  Pneumococcal vaccines  
 x  Flu vaccine  
  x Hepatitis B vaccine (if at risk) X Shingrix X Zostavax  Shingles vaccine  
x   TDAP vaccine  
 x  Mammogram  
  x Pap smear  
  x Colorectal cancer screening  
  x Low-dose CT for lung cancer screening  
x   Bone density test  
 X recently  Glaucoma screening  
 x  Cholesterol test  
 x  Diabetes screening test   
  x Diabetes self-management class  
  x Nutritionist referral for diabetes or renal disease Well Visit, Over 72: Care Instructions Your Care Instructions Physical exams can help you stay healthy. Your doctor has checked your overall health and may have suggested ways to take good care of yourself. He or she also may have recommended tests. At home, you can help prevent illness with healthy eating, regular exercise, and other steps. Follow-up care is a key part of your treatment and safety. Be sure to make and go to all appointments, and call your doctor if you are having problems. It's also a good idea to know your test results and keep a list of the medicines you take. How can you care for yourself at home? · Reach and stay at a healthy weight. This will lower your risk for many problems, such as obesity, diabetes, heart disease, and high blood pressure. · Get at least 30 minutes of exercise on most days of the week. Walking is a good choice. You also may want to do other activities, such as running, swimming, cycling, or playing tennis or team sports. · Do not smoke. Smoking can make health problems worse. If you need help quitting, talk to your doctor about stop-smoking programs and medicines. These can increase your chances of quitting for good. · Protect your skin from too much sun. When you're outdoors from 10 a.m. to 4 p.m., stay in the shade or cover up with clothing and a hat with a wide brim. Wear sunglasses that block UV rays. Even when it's cloudy, put broad-spectrum sunscreen (SPF 30 or higher) on any exposed skin. · See a dentist one or two times a year for checkups and to have your teeth cleaned. · Wear a seat belt in the car. · Limit alcohol to 2 drinks a day for men and 1 drink a day for women. Too much alcohol can cause health problems. Follow your doctor's advice about when to have certain tests. These tests can spot problems early. For men and women · Cholesterol. Your doctor will tell you how often to have this done based on your overall health and other things that can increase your risk for heart attack and stroke. · Blood pressure. Have your blood pressure checked during a routine doctor visit. Your doctor will tell you how often to check your blood pressure based on your age, your blood pressure results, and other factors. · Diabetes. Ask your doctor whether you should have tests for diabetes. · Vision. Experts recommend that you have yearly exams for glaucoma and other age-related eye problems. · Hearing. Tell your doctor if you notice any change in your hearing. You can have tests to find out how well you hear. · Colon cancer tests. Keep having colon cancer tests as your doctor recommends. You can have one of several types of tests. · Heart attack and stroke risk. At least every 4 to 6 years, you should have your risk for heart attack and stroke assessed. Your doctor uses factors such as your age, blood pressure, cholesterol, and whether you smoke or have diabetes to show what your risk for a heart attack or stroke is over the next 10 years. · Osteoporosis.  Talk to your doctor about whether you should have a bone density test to find out whether you have thinning bones. Also ask your doctor about whether you should take calcium and vitamin D supplements. For women · Pap test and pelvic exam. You may no longer need a Pap test. Talk with your doctor about whether to stop or continue to have Pap tests. · Breast exam and mammogram. Ask how often you should have a mammogram, which is an X-ray of your breasts. A mammogram can spot breast cancer before it can be felt and when it is easiest to treat. · Thyroid disease. Talk to your doctor about whether to have your thyroid checked as part of a regular physical exam. Women have an increased chance of a thyroid problem. For men · Prostate exam. Talk to your doctor about whether you should have a blood test (called a PSA test) for prostate cancer. Experts disagree on whether men should have this test. Some experts recommend that you discuss the benefits and risks of the test with your doctor. · Abdominal aortic aneurysm. Ask your doctor whether you should have a test to check for an aneurysm. You may need a test if you ever smoked or if your parent, brother, sister, or child has had an aneurysm. When should you call for help? Watch closely for changes in your health, and be sure to contact your doctor if you have any problems or symptoms that concern you. Where can you learn more? Go to http://dez-cedric.info/. Enter Z010 in the search box to learn more about \"Well Visit, Over 65: Care Instructions. \" Current as of: May 12, 2017 Content Version: 11.4 © 7693-0810 [employer]. Care instructions adapted under license by [employer] (which disclaims liability or warranty for this information). If you have questions about a medical condition or this instruction, always ask your healthcare professional. [place] [address] any warranty or liability for your use of this information. Introducing [facility]! [facility] introduces Toolwi patient portal. Now you can access parts of your medical record, email your doctor's office, and request medication refills online. 1. In your internet browser, go to [email or web address] 2. Click on the First Time User? Click Here link in the Sign In box. You will see the New Member Sign Up page. 3. Enter your Toolwi Access Code exactly as it appears below. You will not need to use this code after youve completed the sign-up process. If you do not sign up before the expiration date, you must request a new code. · Toolwi Access Code: [email or web address] Expires: 2/26/2018  3:38 PM 
 
4. Enter the last four digits of your Social Security Number (xxxx) and Date of Birth (mm/dd/yyyy) as indicated and click Submit. You will be taken to the next sign-up page. 5. Create a Toolwi ID. This will be your Toolwi login ID and cannot be changed, so think of one that is secure and easy to remember. 6. Create a Toolwi password. You can change your password at any time. 7. Enter your Password Reset Question and Answer. This can be used at a later time if you forget your password. 8. Enter your e-mail address. You will receive e-mail notification when new information is available in [address] 9. Click Sign Up. You can now view and download portions of your medical record. 10. Click the Download Summary menu link to download a portable copy of your medical information. If you have questions, please visit the Frequently Asked Questions section of the Toolwi website. Remember, Toolwi is NOT to be used for urgent needs. For medical emergencies, dial 911. Now available from your iPhone and Android! Please provide this summary of care documentation to your next provider. Your primary care clinician is listed as Adelaida Vargas. If you have any questions after today's visit, please call [phone].

## 2018-01-16 NOTE — PROGRESS NOTES
Jayme Treviño [facility]  [address] [place], [address]  [phone]           Date of visit: 1/16/2018       This is an Subsequent Medicare Annual Wellness Visit (AWV), (Performed more than 12 months after effective date of Medicare Part B enrollment and 12 months after last preventive visit)    I have reviewed the patient's medical history in detail and updated the computerized patient record. Mat Murillo is a 80 y.o. female   History obtained from: patient, daughter. Concerns today   -has done PT, likes it very much. Has really helped her balance. They gave her a rollator to use if outside, but hasn't been outside much due to weather  -BP still consistenly controlled at home  -eczema flared up, worse on back. Hard to reach back. Uses dove soap, jergens lotion. -needs gel and ointment for her eczema, will call in to let me know what it is  -has a cyst on right wrist not painful but just annoys her  -left shoulder pain a week ago, her rotator cuff flared up, has that from time to time. Has had a shot before but she got better with heat. 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  doxycycline (ADOXA) 100 mg tablet Take 100 mg by mouth two (2) times a day. AS NEEDED      lisinopril (PRINIVIL, ZESTRIL) 2.5 mg tablet Take 2.5 mg by mouth daily.  progesterone (PROMETRIUM) 200 mg capsule Take 200 mg by mouth daily.  conjugated estrogens (PREMARIN) 0.625 mg tablet Take 0.625 mg by mouth daily.  aspirin delayed-release 81 mg tablet Take  by mouth daily.  CALCIUM CARB/VIT D2/MINERALS (CALTRATE PLUS PO) Take 500 mg by mouth daily.  Omega-3 Fatty Acids (FISH OIL) 500 mg cap Take  by mouth.  gabapentin (NEURONTIN) 100 mg capsule Take 2 Caps by mouth three (3) times daily. Lower dose 720 Cap 1    oxybutynin (DITROPAN) 5 mg tablet Take 1 Tab by mouth daily as needed. Family History   Problem Relation Age of Onset    Other Mother      severe hot flashes    Heart Failure Mother 76    Other Daughter      severe hot flashes    Heart Attack Father 46     Social History   Substance Use Topics    Smoking status: Former Smoker    Smokeless tobacco: Never Used    Alcohol use 8.4 oz/week     8 Glasses of wine, 6 Shots of liquor per week      Comment: occ.        Specialists/Care Team   Anamika Miranda has established care with the following healthcare providers:  Patient Care Team:  Eveline Rodrigues MD as PCP - General (Family Practice)  Jeanna Sigala MD (Ophthalmology)  Dentist and eye doctor, physical therapist    Health Risk Assessment Demographics   female  80 y.o. General Health Questions   -During the past 4 weeks:   -how would you rate your health in general? Good   -how often have you been bothered by feeling dizzy when standing up? Not at all and balance is getting better   -how much have you been bothered by bodily pain? mildly left thumb   -Have you noticed any hearing difficulties? yes good enough but not perfect   -has your physical and emotional health limited your social activities with family or friends? no    Emotional Health Questions   -Do you have a history of depression, anxiety, or emotional problems? no  -Over the past 2 weeks, have you felt down, depressed or hopeless? no  -Over the past 2 weeks, have you felt little interest or pleasure in doing things? no    Health Habits   Please describe your diet habits: eats cheese, egg normal, plenty of veggies  Do you get 5 servings of fruits or vegetables daily? yes  Do you exercise regularly? Rides bike, does 40 min of PT exercises    Activities of Daily Living and Functional Status   -Do you need help with eating, walking, dressing, bathing, toileting, the phone, transportation, shopping, preparing meals, housework, laundry, medications or managing money? Daughter helps her with very little  -In the past four weeks, was someone available to help you if you needed and wanted help with anything? yes  -Are you confident are you that you can control and manage most of your health problems? yes  -Have you been given information to help you keep track of your medications? yes  -How often do you have trouble taking your medications as prescribed? never    Fall Risk and Home Safety   Have you fallen 2 or more times in the past year? no  Does your home have rugs in the hallway, lack grab bars in the bathroom, lack handrails on the stairs or have poor lighting? no  Do you have smoke detectors and check them regularly?  yes  Do you have difficulties driving a car? no  Do you always fasten your seat belt when you are in a car? yes    Review of Systems (if indicated for problems addressed today)   Card: denies chest pain  Pulm: denies shortness of breath        Physical Examination     Vitals:    01/16/18 1440   BP: 156/82   Pulse: 77   Resp: 18   Temp: 97.8 °F (36.6 °C)   TempSrc: Oral   SpO2: 97%   Weight: 150 lb 12.8 oz (68.4 kg)   Height: 5' 5.4\" (1.661 m)     Body mass index is 24.79 kg/(m^2). No exam data present  Was the patient's timed Up & Go test unsteady or longer than 30 seconds? no    Evaluation of Cognitive Function   Mood/affect:  happy  Orientation: normal  Appearance: age appropriate  Family member/caregiver input: none    Additional exam if indicated for problems addressed today:  General: stated age, well developed, well nourished and in NAD  Ext:  No edema noted.  No calf tenderness on right but right calf slightly larger than left  MSK: right knee surgical scar, little tender diffusely, limited extension  Skin:  Back skin dry but no lesions/eczema plaques seen  Psych: alert and oriented to person, place, time and situation and Speech: appropriate quality, quantity and organization of sentences     Advice/Referrals/Counseling (as indicated)   Education and counseling provided for any problems identified above: diet, exercise (keep up good work), cut back etoh    Preventive Services     Health Maintenance   Topic Date Due    DTaP/Tdap/Td series (1 - Tdap) 05/02/1950    ZOSTER VACCINE AGE 60>  03/02/1989    GLAUCOMA SCREENING Q2Y  05/02/1994    OSTEOPOROSIS SCREENING (DEXA)  05/02/1994    Pneumococcal 65+ Low/Medium Risk (1 of 2 - PCV13) 05/02/1994    MEDICARE YEARLY EXAM  05/02/1994    Influenza Age 9 to Adult  Completed      (Preventive care checklist to be included in patient instructions)  Discussed today Done Previously Not Needed     X both  Pneumococcal vaccines    x  Flu vaccine     x Hepatitis B vaccine (if at risk)   X Shingrix X Zostavax  Shingles vaccine   x TDAP vaccine    x  Mammogram     x Pap smear     x Colorectal cancer screening     x Low-dose CT for lung cancer screening   x   Bone density test    X recently  Glaucoma screening    x  Cholesterol test    x  Diabetes screening test      x Diabetes self-management class     x Nutritionist referral for diabetes or renal disease     Discussion of Advance Directive   Discussed with Perla Quinterovicente her ability to prepare and advance directive in the case that an injury or illness causes her to be unable to make health care decisions. Has one, will bring it by    Assessment/Plan   Z00.00    ICD-10-CM ICD-9-CM    1. Medicare annual wellness visit, subsequent Z00.00 V70.0    2. Balance problem R26.89 781.99 REFERRAL TO PHYSICAL THERAPY   3. Essential hypertension I10 401.9    4. Atopic dermatitis, unspecified type L20.9 691.8    5. Chronic left shoulder pain M25.512 719.41     G89.29 338.29    6. Estrogen deficiency E28.39 256.39 DEXA BONE DENSITY STUDY AXIAL   7. 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 Chronic pain of right knee M25.561 719.46 REFERRAL TO PHYSICAL THERAPY    G89.29 338.29        Orders Placed This Encounter    DEXA BONE DENSITY STUDY AXIAL    [facility]    DISCONTD: Dorma Porch,, Tetanus (Deronda Gain) 2.5-8-5 Lf-mcg-Lf/0.5mL susp susp     Doing very well overall  Preventive care as above  Doesn't think she has had dexa before but willing to do  Discussed risks and benefits of weaning hormones  She is reluctant to make many changes as she has been on that regimen for a long time.   Might start a slow wean this spring, gave her instructions for how to do so safely  Discussed moisturization to help eczema and dry skin, can refill cortisone but she will let me know which ones helped  Advised could use rogaine for hair loss, but hers really isn't bad enough to notice  bp consistently controlled at home, no med changes  Bladder doing well with prn ditropan  PT has really helped her balance, will ask them to continue that and to also work on chronic right knee (postsurgical)  Left shoulder better at the moment but told her we could inject in the future if needed; that has helped her in the past  Advised to cut back on etoh, at least a little, she said she will some but enjoys it    Patient Instructions     Try eucerin cream for your back to prevent eczema    Ok to stop the calcium for now if you like    Ok to wean the hormones when you feel ready by doing the following:  Month 1: skip Sundays  [facility] 2: skip Sundays and thursday  Month 3: skip Sunday, Tuesday, Thursday  Month 4: Skip Saturday, Sunday, Tuesday, and Thursday  Month 5: skip 5 days per week  Month 6: skip 6 days per week  Month 7: stop all    Follow-up Disposition:  Return in about 6 months (around 7/16/2018) for Follow up.     Meliton Corado MD

## 2018-01-31 ENCOUNTER — TELEPHONE (OUTPATIENT)
Dept: FAMILY MEDICINE CLINIC | Age: 83
End: 2018-01-31

## 2018-01-31 ENCOUNTER — HOSPITAL ENCOUNTER (OUTPATIENT)
Dept: MAMMOGRAPHY | Age: 83
Discharge: HOME | End: 2018-01-31
Payer: MEDICARE

## 2018-01-31 DIAGNOSIS — E28.39 ESTROGEN DEFICIENCY: ICD-10-CM

## 2018-01-31 PROCEDURE — 77080 DXA BONE DENSITY AXIAL: CPT

## 2018-01-31 NOTE — TELEPHONE ENCOUNTER
Patient is calling, she states that she would like a call back in regards to a mammogram, she states that her last was 2/2017 and she would like to know when she is due for her next one.      Best call back # for patient: [phone]

## 2018-02-02 PROBLEM — M85.89 OSTEOPENIA OF MULTIPLE SITES: Status: ACTIVE | Noted: 2018-02-02

## 2018-02-02 RX ORDER — ALENDRONATE SODIUM 70 MG/1
70 TABLET ORAL
Qty: 13 TAB | Refills: 4 | Status: SHIPPED | OUTPATIENT
Start: 2018-02-02 | End: 2018-02-05

## 2018-02-02 NOTE — TELEPHONE ENCOUNTER
----- Message from Darshan Pulido MD sent at 2/2/2018 11:14 AM EST -----  Would you please advise her that she has osteopenia, and it is bad enough that medicaiton is recommended to prevent breaking a hip? I will call in fosamax to take once weekly. She should let me know if it causes any feelings of heartburn/reflux. She can minimize that risk by taking with a full glass of water but no food and remaining upright for 1 hr after taking the medication. She should continue on the daily calcium/vitamin D supplement as she has been doing. Thanks!  Darshan Pulido MD 2/2/2018 11:14 AM

## 2018-02-02 NOTE — PROGRESS NOTES
Would you please advise her that she has osteopenia, and it is bad enough that medicaiton is recommended to prevent breaking a hip? I will call in fosamax to take once weekly. She should let me know if it causes any feelings of heartburn/reflux. She can minimize that risk by taking with a full glass of water but no food and remaining upright for 1 hr after taking the medication. She should continue on the daily calcium/vitamin D supplement as she has been doing. Thanks!  Miri Deleon MD 2/2/2018 11:14 AM

## 2018-02-05 ENCOUNTER — OFFICE VISIT (OUTPATIENT)
Dept: FAMILY MEDICINE CLINIC | Age: 83
End: 2018-02-05

## 2018-02-05 ENCOUNTER — TELEPHONE (OUTPATIENT)
Dept: FAMILY MEDICINE CLINIC | Age: 83
End: 2018-02-05

## 2018-02-05 ENCOUNTER — HOSPITAL ENCOUNTER (OUTPATIENT)
Dept: LAB | Age: 83
Discharge: HOME | End: 2018-02-05
Payer: MEDICARE

## 2018-02-05 VITALS
HEIGHT: 65 IN | BODY MASS INDEX: 24.99 KG/M2 | HEART RATE: 96 BPM | RESPIRATION RATE: 18 BRPM | SYSTOLIC BLOOD PRESSURE: 129 MMHG | OXYGEN SATURATION: 98 % | WEIGHT: 150 LBS | DIASTOLIC BLOOD PRESSURE: 66 MMHG | TEMPERATURE: 97.6 F

## 2018-02-05 DIAGNOSIS — N30.90 CYSTITIS: ICD-10-CM

## 2018-02-05 DIAGNOSIS — R35.0 URINARY FREQUENCY: Primary | ICD-10-CM

## 2018-02-05 LAB
BILIRUB UR QL STRIP: NEGATIVE
GLUCOSE UR-MCNC: NEGATIVE MG/DL
KETONES P FAST UR STRIP-MCNC: NEGATIVE MG/DL
PH UR STRIP: 7 [PH] (ref 4.6–8)
PROT UR QL STRIP: NEGATIVE
SP GR UR STRIP: 1.01 (ref 1–1.03)
UA UROBILINOGEN AMB POC: NORMAL (ref 0.2–1)
URINALYSIS CLARITY POC: NORMAL
URINALYSIS COLOR POC: YELLOW
URINE BLOOD POC: NORMAL
URINE LEUKOCYTES POC: NORMAL
URINE NITRITES POC: NEGATIVE

## 2018-02-05 PROCEDURE — 87088 URINE BACTERIA CULTURE: CPT

## 2018-02-05 PROCEDURE — 87186 SC STD MICRODIL/AGAR DIL: CPT

## 2018-02-05 PROCEDURE — 81001 URINALYSIS AUTO W/SCOPE: CPT

## 2018-02-05 PROCEDURE — 87086 URINE CULTURE/COLONY COUNT: CPT

## 2018-02-05 PROCEDURE — 36415 COLL VENOUS BLD VENIPUNCTURE: CPT

## 2018-02-05 RX ORDER — NITROFURANTOIN (MACROCRYSTALS) 100 MG/1
100 CAPSULE ORAL
Qty: 5 CAP | Refills: 0 | Status: SHIPPED | OUTPATIENT
Start: 2018-02-05 | End: 2018-02-10

## 2018-02-05 NOTE — PATIENT INSTRUCTIONS

## 2018-02-05 NOTE — PROGRESS NOTES
Chief Complaint   Patient presents with    Urinary Frequency     Patient is constantly going to the restroom. 1. Have you been to the ER, urgent care clinic since your last visit? Hospitalized since your last visit? No    2. Have you seen or consulted any other health care providers outside of the [address] [place] [address] since your last visit? Include any pap smears or colon screening.  No

## 2018-02-05 NOTE — MR AVS SNAPSHOT
[address] [facility] 
 
 
 [address] [place] [address] 
[phone] Patient: Fronie Gaucher MRN: [id number] UME:[birth year] Visit Information Date & Time Provider Department Dept. Phone Encounter #  
 2/5/2018  2:15 PM [facility] Real  [place] [phone] [id number] Follow-up Instructions Return if symptoms worsen or fail to improve. Your Appointments 7/19/2018  1:45 PM  
ROUTINE CARE with Thu Foster MD  
[facility]) Appt Note: 6 months f/u appt  
 [address] [address]  
[phone]  
  
   
 [address] [address] Upcoming Health Maintenance Date Due DTaP/Tdap/Td series (1 - Tdap) 5/2/1950 ZOSTER VACCINE AGE 60> 3/2/1989 GLAUCOMA SCREENING Q2Y 5/2/1994 Pneumococcal 65+ Low/Medium Risk (1 of 2 - PCV13) 5/2/1994 MEDICARE YEARLY EXAM 1/17/2019 Allergies as of 2/5/2018  Review Complete On: 2/5/2018 By: Timur Pedroza LPN Severity Noted Reaction Type Reactions Voltaren [Diclofenac Sodium] High 11/28/2017   Side Effect Anaphylaxis [place] Normal  11/28/2017    Other (comments) Neosporin [Benzalkonium Chloride]  11/28/2017   Topical Rash Current Immunizations  Reviewed on 1/11/2018 Name Date HPV 9/18/2017 Influenza Vaccine 9/18/2017 Not reviewed this visit You Were Diagnosed With   
  
 Codes Comments Urinary frequency    -  Primary ICD-10-CM: R35.0 ICD-9-CM: 788.41 Cystitis     ICD-10-CM: N30.90 ICD-9-CM: 595.9 Vitals BP Pulse Temp Resp Height(growth percentile) Weight(growth percentile) 129/66 (BP 1 Location: Left arm, BP Patient Position: Sitting) 96 97.6 °F (36.4 °C) (Oral) 18 5' 5.4\" (1.661 m) 150 lb (68 kg) SpO2 BMI OB Status Smoking Status 98% 24.66 kg/m2 Postmenopausal Former Smoker BMI and BSA Data Body Mass Index Body Surface Area  
 24.66 kg/m 2 1.77 m 2 Preferred Pharmacy Pharmacy Name Phone [id number] [address] [phone] Your Updated Medication List  
  
   
This list is accurate as of: 2/5/18  2:50 PM.  Always use your most recent med list.  
  
  
  
  
 alendronate 70 mg tablet Commonly known as:  FOSAMAX Take 1 Tab by mouth every seven (7) days. aspirin delayed-release 81 mg tablet Take  by mouth daily. CALTRATE PLUS PO Take 500 mg by mouth daily. conjugated estrogens 0.625 mg tablet Commonly known as:  PREMARIN Take 0.625 mg by mouth daily. doxycycline 100 mg tablet Commonly known as:  ADOXA Take 100 mg by mouth two (2) times a day. AS NEEDED FISH  mg Cap Generic drug:  Omega-3 Fatty Acids Take  by mouth.  
  
 gabapentin 100 mg capsule Commonly known as:  NEURONTIN Take 2 Caps by mouth three (3) times daily. Lower dose  
  
 lisinopril 2.5 mg tablet Commonly known as:  Madonna Cliche Take 2.5 mg by mouth daily. nitrofurantoin 100 mg capsule Commonly known as:  MACRODANTIN Take 1 Cap by mouth nightly for 5 days. oxybutynin 5 mg tablet Commonly known as:  TCNUADIX Take 1 Tab by mouth daily as needed. PROMETRIUM 200 mg capsule Generic drug:  progesterone Take 200 mg by mouth daily. UNITHROID 75 mcg tablet Generic drug:  levothyroxine Take  by mouth Daily (before breakfast). Prescriptions Sent to Pharmacy Refills  
 nitrofurantoin (MACRODANTIN) 100 mg capsule 0 Sig: Take 1 Cap by mouth nightly for 5 days. Class: Normal  
 Pharmacy: [address] [place], [place] [address] Ph #: [phone] Route: Oral  
  
We Performed the Following AMB POC URINALYSIS DIP STICK AUTO W/O MICRO [80424 CPT(R)] CULTURE, URINE [id number] CPT(R)] URINALYSIS W/ RFLX MICROSCOPIC [90503 CPT(R)] Follow-up Instructions Return if symptoms worsen or fail to improve. Patient Instructions Urinary Tract Infection in Women: Care Instructions Your Care Instructions A urinary tract infection, or UTI, is a general term for an infection anywhere between the kidneys and the urethra (where urine comes out). Most UTIs are bladder infections. They often cause pain or burning when you urinate. UTIs are caused by bacteria and can be cured with antibiotics. Be sure to complete your treatment so that the infection goes away. Follow-up care is a key part of your treatment and safety. Be sure to make and go to all appointments, and call your doctor if you are having problems. It's also a good idea to know your test results and keep a list of the medicines you take. How can you care for yourself at home? · Take your antibiotics as directed. Do not stop taking them just because you feel better. You need to take the full course of antibiotics. · Drink extra water and other fluids for the next day or two. This may help wash out the bacteria that are causing the infection. (If you have kidney, heart, or liver disease and have to limit fluids, talk with your doctor before you increase your fluid intake.) · Avoid drinks that are carbonated or have caffeine. They can irritate the bladder. · Urinate often. Try to empty your bladder each time. · To relieve pain, take a hot bath or lay a heating pad set on low over your lower belly or genital area. Never go to sleep with a heating pad in place. To prevent UTIs · Drink plenty of water each day. This helps you urinate often, which clears bacteria from your system. (If you have kidney, heart, or liver disease and have to limit fluids, talk with your doctor before you increase your fluid intake.) · Urinate when you need to. · Urinate right after you have sex. · Change sanitary pads often. · Avoid douches, bubble baths, feminine hygiene sprays, and other feminine hygiene products that have deodorants. · After going to the bathroom, wipe from front to back. When should you call for help? Call your doctor now or seek immediate medical care if: 
? · Symptoms such as fever, chills, nausea, or vomiting get worse or appear for the first time. ? · You have new pain in your back just below your rib cage. This is called flank pain. ? · There is new blood or pus in your urine. ? · You have any problems with your antibiotic medicine. ? Watch closely for changes in your health, and be sure to contact your doctor if: 
? · You are not getting better after taking an antibiotic for 2 days. ? · Your symptoms go away but then come back. Where can you learn more? Go to http://dez-cedric.info/. Enter Y565 in the search box to learn more about \"Urinary Tract Infection in Women: Care Instructions. \" Current as of: May 12, 2017 Content Version: 11.4 © 6351-2531 [employer]. Care instructions adapted under license by [employer] (which disclaims liability or warranty for this information). If you have questions about a medical condition or this instruction, always ask your healthcare professional. [address] any warranty or liability for your use of this information. Introducing [facility]! Layla Jacobs introduces eMarketer patient portal. Now you can access parts of your medical record, email your doctor's office, and request medication refills online. 1. In your internet browser, go to [email or web address] 2. Click on the First Time User? Click Here link in the Sign In box. You will see the New Member Sign Up page. 3. Enter your eMarketer Access Code exactly as it appears below. You will not need to use this code after youve completed the sign-up process.  If you do not sign up before the expiration date, you must request a new code. · HighGround Access Code: [email or web address] Expires: 2/26/2018  3:38 PM 
 
4. Enter the last four digits of your Social Security Number (xxxx) and Date of Birth (mm/dd/yyyy) as indicated and click Submit. You will be taken to the next sign-up page. 5. Create a HighGround ID. This will be your HighGround login ID and cannot be changed, so think of one that is secure and easy to remember. 6. Create a HighGround password. You can change your password at any time. 7. Enter your Password Reset Question and Answer. This can be used at a later time if you forget your password. 8. Enter your e-mail address. You will receive e-mail notification when new information is available in [address] 9. Click Sign Up. You can now view and download portions of your medical record. 10. Click the Download Summary menu link to download a portable copy of your medical information. If you have questions, please visit the Frequently Asked Questions section of the HighGround website. Remember, HighGround is NOT to be used for urgent needs. For medical emergencies, dial 911. Now available from your iPhone and Android! Please provide this summary of care documentation to your next provider. Your primary care clinician is listed as Jairo Guzman. If you have any questions after today's visit, please call [phone].

## 2018-02-05 NOTE — PROGRESS NOTES
[facility] Note      Subjective:     Chief Complaint   Patient presents with    Urinary Frequency     Patient is constantly going to the restroom. Sloane Grossman is a 80y.o. 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 Review of Systems   Pertinent positives and negative per HPI. All other systems  reviewed are negative for a Comprehensive ROS (10+). Past Medical History:   Diagnosis Date    Arthritis     Eczema     Hypertension 3567    Lichen planus     Oral    Trigeminal neuralgia         Social History     Social History    Marital status:      Spouse name: N/A    Number of children: N/A    Years of education: N/A     Occupational History    Not on file. Social History Main Topics    Smoking status: Former Smoker    Smokeless tobacco: Never Used    Alcohol use 8.4 oz/week     8 Glasses of wine, 6 Shots of liquor per week      Comment: occ.  Drug use: No    Sexual activity: Not Currently     Other Topics Concern    Not on file     Social History Narrative    Lives in [facility], independent living    Daughter Nanette Ann lives nearby       Current Outpatient Prescriptions   Medication Sig    levothyroxine (UNITHROID) 75 mcg tablet Take  by mouth Daily (before breakfast).  doxycycline (ADOXA) 100 mg tablet Take 100 mg by mouth two (2) times a day. AS NEEDED    lisinopril (PRINIVIL, ZESTRIL) 2.5 mg tablet Take 2.5 mg by mouth daily.  progesterone (PROMETRIUM) 200 mg capsule Take 200 mg by mouth daily.  conjugated estrogens (PREMARIN) 0.625 mg tablet Take 0.625 mg by mouth daily.     aspirin delayed-release 81 mg tablet Take  by mouth daily.  CALCIUM CARB/VIT D2/MINERALS (CALTRATE PLUS PO) Take 500 mg by mouth daily.  Omega-3 Fatty Acids (FISH OIL) 500 mg cap Take  by mouth.  gabapentin (NEURONTIN) 100 mg capsule Take 2 Caps by mouth three (3) times daily. Lower dose    oxybutynin (DITROPAN) 5 mg tablet Take 1 Tab by mouth daily as needed.  alendronate (FOSAMAX) 70 mg tablet Take 1 Tab by mouth every seven (7) days. No current facility-administered medications for this visit. Objective:     Vitals:    02/05/18 1419   BP: 129/66   Pulse: 96   Resp: 18   Temp: 97.6 °F (36.4 °C)   TempSrc: Oral   SpO2: 98%   Weight: 150 lb (68 kg)   Height: 5' 5.4\" (1.661 m)       Physical Examination:  General: Alert, cooperative, no distress, appears stated age. Eyes: Conjunctivae/corneas clear. PERRL, EOMs intact. Ears: Normal external ear canals both ears. Mouth/Throat: Lips, mucosa, and tongue normal. Teeth and gums normal.  Neck: Supple, symmetrical, trachea midline, no adenopathy. No thyroid enlargement/tenderness/nodules  Back: Symmetric, no curvature. ROM normal. No CVA tenderness. Lungs: Clear to auscultation bilaterally. Normal inspiratory and expiratory ratio. Heart: Regular rate and rhythm, S1, S2 normal, no murmur, click, rub or gallop. Abdomen: Soft, non-tender. Bowel sounds normal. No masses or organomegaly. Extremities: Extremities normal, atraumatic, no cyanosis or edema. Pulses: 2+ and symmetric all extremities. Skin: Skin color, texture, turgor normal. No rashes or lesions on exposed skin. Lymph nodes: Cervical, supraclavicular nodes normal.  Neurologic: CNII-XII intact. Strength 5/5 grossly. Sensation and reflexes normal throughout.     Office Visit on 02/05/2018   Component Date Value Ref Range Status    Color (UA POC) 02/05/2018 Yellow   Final    Clarity (UA POC) 02/05/2018 Cloudy   Final    Glucose (UA POC) 02/05/2018 Negative  Negative Final    Bilirubin (UA POC) 02/05/2018 Negative Negative Final    Ketones (UA POC) 02/05/2018 Negative  Negative Final    Specific gravity (UA POC) 02/05/2018 1.015  1.001 - 1.035 Final    Blood (UA POC) 02/05/2018 Trace  Negative Final    pH (UA POC) 02/05/2018 7.0  4.6 - 8.0 Final    Protein (UA POC) 02/05/2018 Negative  Negative Final    Urobilinogen (UA POC) 02/05/2018 0.2 mg/dL  0.2 - 1 Final    Nitrites (UA POC) 02/05/2018 Negative  Negative Final    Leukocyte esterase (UA POC) 02/05/2018 2+  Negative Final   Office Visit on 11/28/2017   Component Date Value Ref Range Status    TSH 11/28/2017 0.795  0.450 - 4.500 uIU/mL Final    WBC 11/28/2017 7.0  3.4 - 10.8 x10E3/uL Final    RBC 11/28/2017 4.58  3.77 - 5.28 x10E6/uL Final    HGB 11/28/2017 14.1  11.1 - 15.9 g/dL Final    Comment: **Effective December 4, 2017 the reference interval**    for Hemoglobin MALES only will be changing to: Males 13-15 years: 12.6 - 17.7                          Males   >15 years: 13.0 - 17.7      HCT 11/28/2017 41.4  34.0 - 46.6 % Final    MCV 11/28/2017 90  79 - 97 fL Final    MCH 11/28/2017 30.8  26.6 - 33.0 pg Final    MCHC 11/28/2017 34.1  31.5 - 35.7 g/dL Final    RDW 11/28/2017 13.9  12.3 - 15.4 % Final    PLATELET 00/58/3038 513  150 - 379 x10E3/uL Final    NEUTROPHILS 11/28/2017 68  Not Estab. % Final    Lymphocytes 11/28/2017 20  Not Estab. % Final    MONOCYTES 11/28/2017 9  Not Estab. % Final    EOSINOPHILS 11/28/2017 2  Not Estab. % Final    BASOPHILS 11/28/2017 1  Not Estab. % Final    ABS. NEUTROPHILS 11/28/2017 4.8  1.4 - 7.0 x10E3/uL Final    Abs Lymphocytes 11/28/2017 1.4  0.7 - 3.1 x10E3/uL Final    ABS. MONOCYTES 11/28/2017 0.6  0.1 - 0.9 x10E3/uL Final    ABS. EOSINOPHILS 11/28/2017 0.2  0.0 - 0.4 x10E3/uL Final    ABS. BASOPHILS 11/28/2017 0.1  0.0 - 0.2 x10E3/uL Final    IMMATURE GRANULOCYTES 11/28/2017 0  Not Estab. % Final    ABS. IMM.  GRANS. 11/28/2017 0.0  0.0 - 0.1 x10E3/uL Final    Glucose 11/28/2017 80  65 - 99 mg/dL Final    BUN 11/28/2017 19  8 - 27 mg/dL Final    Creatinine 11/28/2017 1.08* 0.57 - 1.00 mg/dL Final    GFR est non-AA 11/28/2017 46* >59 mL/min/1.73 Final    GFR est AA 11/28/2017 53* >59 mL/min/1.73 Final    BUN/Creatinine ratio 11/28/2017 18  12 - 28 Final    Sodium 11/28/2017 140  134 - 144 mmol/L Final    Potassium 11/28/2017 4.5  3.5 - 5.2 mmol/L Final    Chloride 11/28/2017 102  96 - 106 mmol/L Final    CO2 11/28/2017 21  18 - 29 mmol/L Final    Calcium 11/28/2017 9.7  8.7 - 10.3 mg/dL Final    Protein, total 11/28/2017 6.8  6.0 - 8.5 g/dL Final    Albumin 11/28/2017 4.1  3.5 - 4.7 g/dL Final    GLOBULIN, TOTAL 11/28/2017 2.7  1.5 - 4.5 g/dL Final    A-G Ratio 11/28/2017 1.5  1.2 - 2.2 Final    Bilirubin, total 11/28/2017 0.3  0.0 - 1.2 mg/dL Final    Alk. phosphatase 11/28/2017 76  39 - 117 IU/L Final    AST (SGOT) 11/28/2017 15  0 - 40 IU/L Final    ALT (SGPT) 11/28/2017 10  0 - 32 IU/L Final    Cholesterol, total 11/28/2017 279* 100 - 199 mg/dL Final    Triglyceride 11/28/2017 138  0 - 149 mg/dL Final    HDL Cholesterol 11/28/2017 99  >39 mg/dL Final    VLDL, calculated 11/28/2017 28  5 - 40 mg/dL Final    LDL, calculated 11/28/2017 152* 0 - 99 mg/dL Final    INTERPRETATION 11/28/2017 Note   Final    Supplemental report is available.  PDF IMAGE 90/39/9801 Not applicable   Final    Interpretation 11/28/2017 Note   Final    Supplemental report is available. Assessment/ Plan:   Diagnoses and all orders for this visit:    1. Urinary frequency  -     AMB POC URINALYSIS DIP STICK AUTO W/O MICRO    2. Cystitis  -     CULTURE, URINE  -     URINALYSIS W/ RFLX MICROSCOPIC  -     nitrofurantoin (MACRODANTIN) 100 mg capsule; Take 1 Cap by mouth nightly for 5 days. Signs of acute cystitis. Treat with nitrofurantoin. Confirm with urinalysis and urine culture.        I have discussed the diagnosis with the patient and the intended plan as seen in the above orders. The patient has received an after-visit summary and questions were answered concerning future plans. I have discussed medication side effects and warnings with the patient as well. Follow-up Disposition:  Return if symptoms worsen or fail to improve.   To      Signed,    Brooklynn Hernandez MD  2/5/2018

## 2018-02-06 LAB
APPEARANCE UR: ABNORMAL
BACTERIA #/AREA URNS HPF: ABNORMAL /[HPF]
BILIRUB UR QL STRIP: NEGATIVE
CASTS URNS QL MICRO: ABNORMAL /LPF
COLOR UR: YELLOW
CRYSTALS URNS MICRO: ABNORMAL
EPI CELLS #/AREA URNS HPF: ABNORMAL /HPF
GLUCOSE UR QL: NEGATIVE
HGB UR QL STRIP: NEGATIVE
KETONES UR QL STRIP: NEGATIVE
LEUKOCYTE ESTERASE UR QL STRIP: ABNORMAL
MICRO URNS: ABNORMAL
NITRITE UR QL STRIP: POSITIVE
PH UR STRIP: 7 [PH] (ref 5–7.5)
PROT UR QL STRIP: NEGATIVE
RBC #/AREA URNS HPF: ABNORMAL /HPF
SP GR UR: 1.01 (ref 1–1.03)
UNIDENT CRYS URNS QL MICRO: PRESENT
UROBILINOGEN UR STRIP-MCNC: 0.2 MG/DL (ref 0.2–1)
WBC #/AREA URNS HPF: >30 /HPF

## 2018-02-07 ENCOUNTER — TELEPHONE (OUTPATIENT)
Dept: FAMILY MEDICINE CLINIC | Age: 83
End: 2018-02-07

## 2018-02-07 RX ORDER — ALENDRONATE SODIUM 70 MG/1
70 TABLET ORAL
Qty: 13 TAB | Refills: 4 | Status: SHIPPED | OUTPATIENT
Start: 2018-02-07 | End: 2018-07-19 | Stop reason: SDUPTHER

## 2018-02-07 NOTE — PROGRESS NOTES
Notify patient:    Urine test results show bacterial infection. For some reason the urine culture was not completed, probably due to small sample. If you are feeling better after the antibiotic treatment then there is nothing to worry about, if not call to clinic.

## 2018-02-07 NOTE — TELEPHONE ENCOUNTER
Incoming call from patient.  verified. Patient requesting results from Dexa scan. Reviewed results with patient and explained to patient osteopenia. Advised that MD is recommending medication called fosamax that is taking once weekly on empty stomach but if it causes indigestion to drink a glass of water. And to sit upright for at least an hour after taking medication.

## 2018-02-08 LAB — BACTERIA UR CULT: ABNORMAL

## 2018-02-09 NOTE — PROGRESS NOTES
Notify patient    The urine culture was completed. The antibiotic that you were provided should kill of the bacteria causing infection.

## 2018-02-12 ENCOUNTER — TELEPHONE (OUTPATIENT)
Dept: FAMILY MEDICINE CLINIC | Age: 83
End: 2018-02-12

## 2018-02-12 DIAGNOSIS — Z12.39 BREAST CANCER SCREENING: Primary | ICD-10-CM

## 2018-02-12 DIAGNOSIS — Z12.31 ENCOUNTER FOR SCREENING MAMMOGRAM FOR BREAST CANCER: ICD-10-CM

## 2018-02-12 NOTE — TELEPHONE ENCOUNTER
Patient is calling requesting a mammogram order from Dr. Pasco Cooks. Patient states that the last time she received one was in 02/6/2017. Patient would like a call back regarding this.      Patients number: [phone]  2/12/18

## 2018-02-12 NOTE — TELEPHONE ENCOUNTER
I called pt back. She states that she just got a note from last place she had a mammo that she is due her yearly exam.  Advised that I will forward her request to Dr Tigist Yee. Once scheduled she will need to conatact the previous facility and ask them to send the films to new place that she will be going. Pt states understanding.

## 2018-02-27 ENCOUNTER — TELEPHONE (OUTPATIENT)
Dept: FAMILY MEDICINE CLINIC | Age: 83
End: 2018-02-27

## 2018-02-27 NOTE — TELEPHONE ENCOUNTER
She wanted to know if the diarrhea and frequent urination and sleepless night came from the fosamax. It only lasted about 24 hours and then she was fine. She hasn't had any problem since then. She is willing to try taking the fosamax again to see if she has the same reaction. She also would like to take aleve for a few days b/c of some knee pain. She is going to go see the orthopedist that did the knee replacement. She took one today and it seem to help. She just wanted to double check b/c she had been told in the past not to take it very much. Told her I would double check with Dr Aiden Collins and get back with her.

## 2018-02-27 NOTE — TELEPHONE ENCOUNTER
I would not recommend any aleve--not safe for her kidneys.  Tylenol only, or a heating pad  I don't think the fosamax would have caused those symptoms so would advise her to try it again

## 2018-02-27 NOTE — TELEPHONE ENCOUNTER
Pt is calling regarding med, alendronate (FOSAMAX) 70 mg tablet, Pt had a reaction from this medication and it was not the the normal reaction that was listed when you take the medication but instead pt had a complete sleepless night, frquent urination and diaghrea. Pt would like to know if it going to occur again every time she takes this medication or if it was just a one time thing. Pt would like to know if she can to [facility] as well. Pt would like a call back.     pts number: [phone]  2/27/18

## 2018-03-23 ENCOUNTER — HOSPITAL ENCOUNTER (OUTPATIENT)
Dept: MAMMOGRAPHY | Age: 83
Discharge: HOME | End: 2018-03-23
Payer: MEDICARE

## 2018-03-23 DIAGNOSIS — Z12.31 ENCOUNTER FOR SCREENING MAMMOGRAM FOR BREAST CANCER: ICD-10-CM

## 2018-03-23 PROCEDURE — 77067 SCR MAMMO BI INCL CAD: CPT

## 2018-04-10 ENCOUNTER — HOSPITAL ENCOUNTER (OUTPATIENT)
Dept: MAMMOGRAPHY | Age: 83
Discharge: HOME | End: 2018-04-10
Payer: MEDICARE

## 2018-04-10 ENCOUNTER — HOSPITAL ENCOUNTER (OUTPATIENT)
Dept: ULTRASOUND IMAGING | Age: 83
Discharge: HOME | End: 2018-04-10
Payer: MEDICARE

## 2018-04-10 ENCOUNTER — TELEPHONE (OUTPATIENT)
Dept: FAMILY MEDICINE CLINIC | Age: 83
End: 2018-04-10

## 2018-04-10 DIAGNOSIS — R92.8 ABNORMAL MAMMOGRAPHY: ICD-10-CM

## 2018-04-10 PROCEDURE — 77065 DX MAMMO INCL CAD UNI: CPT

## 2018-04-10 PROCEDURE — 76642 ULTRASOUND BREAST LIMITED: CPT

## 2018-04-10 NOTE — TELEPHONE ENCOUNTER
Nida calling from [facility] in regards to getting permission to schedule patient for a ultrasound guided biopsy at [facility]. She states that she will scheduled patient and if there are any question or concerns she can be contacted.       Best call back # [phone]

## 2018-04-16 ENCOUNTER — HOSPITAL ENCOUNTER (OUTPATIENT)
Dept: MAMMOGRAPHY | Age: 83
Discharge: HOME | End: 2018-04-16
Attending: FAMILY MEDICINE
Payer: MEDICARE

## 2018-04-16 DIAGNOSIS — N63.0 LUMP OR MASS IN BREAST: ICD-10-CM

## 2018-04-16 PROCEDURE — 19000 PUNCTURE ASPIR CYST BREAST: CPT

## 2018-04-16 PROCEDURE — 74011000250 HC RX REV CODE- 250: Performed by: RADIOLOGY

## 2018-04-16 RX ORDER — LIDOCAINE HYDROCHLORIDE 10 MG/ML
5 INJECTION INFILTRATION; PERINEURAL
Status: COMPLETED | OUTPATIENT
Start: 2018-04-16 | End: 2018-04-16

## 2018-04-16 RX ORDER — LIDOCAINE HYDROCHLORIDE AND EPINEPHRINE 10; 10 MG/ML; UG/ML
10 INJECTION, SOLUTION INFILTRATION; PERINEURAL ONCE
Status: COMPLETED | OUTPATIENT
Start: 2018-04-16 | End: 2018-04-16

## 2018-04-16 RX ADMIN — LIDOCAINE HYDROCHLORIDE AND EPINEPHRINE 100 MG: 10; 10 INJECTION, SOLUTION INFILTRATION; PERINEURAL at 09:50

## 2018-04-16 RX ADMIN — LIDOCAINE HYDROCHLORIDE 5 ML: 10 INJECTION, SOLUTION INFILTRATION; PERINEURAL at 09:50

## 2018-04-16 NOTE — PROGRESS NOTES
Patient tolerated right breast cyst aspiration well with scant bleeding. Small bruise noted at the aspiration site. Band-aid applied to site.

## 2018-04-16 NOTE — DISCHARGE INSTRUCTIONS
Breast Biopsy Discharge Instructions    PAIN CONTROL     You may have mild discomfort; take 1-2 Tylenol every 4-6 hours as needed.  Do not take aspirin containing products or anti-inflammatory medications (Advil, Aleve, Motrin, Ibuprofen, etc.) for 24 hours. WOUND CARE      A small amount of bleeding or bruising at biopsy site is normal. Watch for signs and symptoms of infections: skin warm to touch, yellowish drainage from wound, fever or severe pain.  Please try not get the site wet for about 24 hours.  If at any point you have EXCESSIVE BLEEDING (bleeding through the band-aid) OR pain please call:    Daytime 7:30am-5:00pm: [place] [phone]    After Hours:    [phone] (ask to speak to a radiologist)              or [address] [place] [address] [phone]    ACTIVITY     Do not participate in any strenuous activities for 24 hours (exercise, sports, housework, etc.).  You may resume work (light duty only) for the first 24 hours.  No heavy lifting with the arm on the affected side of your body. ADDITIONAL INSTRUCTIONS    Please call with any questions or concerns.        YOUR TREATMENT TEAM TODAY WAS    MD: Rafael Ortiz  RN: Brandt Speaker  Radiology Tech: German Rodriguze

## 2018-04-18 ENCOUNTER — TELEPHONE (OUTPATIENT)
Dept: FAMILY MEDICINE CLINIC | Age: 83
End: 2018-04-18

## 2018-04-18 NOTE — TELEPHONE ENCOUNTER
Called pt and she states that last normal BM was Sunday. She hadn't had another bm until today. She had taken some magnesium citrate earlier and she is now sitting on the commode. She has been having some watery stool bna she did a a small formed stool that past but she states that she feels a hard stool in her rectum that is trying to come out. Recommended that she increase her water intake. If she still has the hard stool tomorrow she needs to make an appt for eval.  Pt is agreeable with plan. She didn't want to come today b/c she is stool on the commode. She just wanted a plan.

## 2018-05-16 RX ORDER — PROGESTERONE 200 MG/1
200 CAPSULE ORAL
Qty: 40 CAP | Refills: 1 | Status: SHIPPED | OUTPATIENT
Start: 2018-05-16 | End: 2018-06-04 | Stop reason: SDUPTHER

## 2018-05-16 NOTE — TELEPHONE ENCOUNTER
Called pt to let her know that Dr Royal Sharma would need to see you for eval.  Pt states that she will call back to schedule, she has company.

## 2018-05-16 NOTE — TELEPHONE ENCOUNTER
I will do the refills. She may need some more of her hormones because we are weaning them so gradually. I would need to see her to do the shoulder PT referral, so that I can do xrays and exam and just make sure nothing new is going on.

## 2018-05-16 NOTE — TELEPHONE ENCOUNTER
Patient is calling back. [phone]    She had rotator cuff surgery and it's getting week and wants PT to strengthen her arms. Also, she said she is going off of Hormone Therapy and would need another prescription. Requested Prescriptions     Pending Prescriptions Disp Refills    conjugated estrogens (PREMARIN) 0.625 mg tablet       Sig: Take 1 Tab by mouth daily.  progesterone (PROMETRIUM) 200 mg capsule       Sig: Take 1 Cap by mouth daily.

## 2018-05-16 NOTE — TELEPHONE ENCOUNTER
Pt would like to get PT for her shoulder weakness. She had had rotator cuff surgery back in 2001. I let her know that I would check with Dr Gail Bai but she may need to come in for an appt. I will get back with her.

## 2018-05-22 ENCOUNTER — TELEPHONE (OUTPATIENT)
Dept: FAMILY MEDICINE CLINIC | Age: 83
End: 2018-05-22

## 2018-05-22 NOTE — TELEPHONE ENCOUNTER
Patient is calling in regards to a recommendation to a dermatologist she states she is requesting for multiple due to location and being able to drive to there location. She would like a call back patient also asked for an email.      Best call back [phone]

## 2018-05-24 NOTE — TELEPHONE ENCOUNTER
Patient is returning your call. [phone] If she's not there, she would like an email or a detailed message.

## 2018-05-24 NOTE — TELEPHONE ENCOUNTER
Called pt and she was looking for names of dermatologist close to her( which is close to us). I gave her some names. I did recommend that she contact her insurance to double check who participates with her insurance since she has . Gasper Sutton

## 2018-06-04 ENCOUNTER — TELEPHONE (OUTPATIENT)
Dept: FAMILY MEDICINE CLINIC | Age: 83
End: 2018-06-04

## 2018-06-04 NOTE — TELEPHONE ENCOUNTER
Patient is calling requesting for medication refill to be called into pharmacy. She states medication has been called into a different pharmacy and it was on back order.  progesterone (PROMETRIUM) 200 mg capsule       [id number] [place] [facility] [address] AT [address] [phone]    Best call back [phone]

## 2018-06-04 NOTE — TELEPHONE ENCOUNTER
Patient is requesting to be referred to dermatologist  And also check on the prescription (progesterone) which she have not received yet (last refill on 5/16/18)  Best call back :[phone]  Last seen : February 05, 2018  Upcoming : July 19, 2018

## 2018-06-04 NOTE — TELEPHONE ENCOUNTER
I called pt to let her know that Dr Ellen Nelson did the refill on the premarin and the progesterone the same day on 5/16/18. Pt said that she got the premarin she didn't get the progesterone. advised that she needs to call the pharmacy. Pt said that she has tried calling them but isn't getting an answer. I gave her the # that we had and she said that was the same. I went over the directions to see where she is int weaning process. She in in the 3rd month see below:    Month 3: skip Sunday, Tuesday, Thursday  Month 4: Skip Saturday, Sunday, Tuesday, and Thursday  Month 5: skip 5 days per week  Month 6: skip 6 days per week  Month 7: stop all    She got a late start on the weaning.

## 2018-06-05 RX ORDER — PROGESTERONE 200 MG/1
200 CAPSULE ORAL
Qty: 40 CAP | Refills: 1 | Status: SHIPPED | OUTPATIENT
Start: 2018-06-06 | End: 2018-07-19

## 2018-06-11 ENCOUNTER — TELEPHONE (OUTPATIENT)
Dept: FAMILY MEDICINE CLINIC | Age: 83
End: 2018-06-11

## 2018-07-19 ENCOUNTER — OFFICE VISIT (OUTPATIENT)
Dept: FAMILY MEDICINE CLINIC | Age: 83
End: 2018-07-19

## 2018-07-19 ENCOUNTER — HOSPITAL ENCOUNTER (OUTPATIENT)
Dept: LAB | Age: 83
Discharge: HOME | End: 2018-07-19
Payer: MEDICARE

## 2018-07-19 VITALS
DIASTOLIC BLOOD PRESSURE: 71 MMHG | RESPIRATION RATE: 14 BRPM | HEART RATE: 86 BPM | WEIGHT: 151 LBS | SYSTOLIC BLOOD PRESSURE: 138 MMHG | BODY MASS INDEX: 25.16 KG/M2 | HEIGHT: 65 IN | TEMPERATURE: 97.8 F | OXYGEN SATURATION: 99 %

## 2018-07-19 DIAGNOSIS — N18.30 CHRONIC RENAL DISEASE, STAGE 3, MODERATELY DECREASED GLOMERULAR FILTRATION RATE BETWEEN 30-59 ML/MIN/1.73 SQUARE METER (HCC): ICD-10-CM

## 2018-07-19 DIAGNOSIS — G89.29 CHRONIC LEFT SHOULDER PAIN: ICD-10-CM

## 2018-07-19 DIAGNOSIS — B02.22 NEUROPATHIC POSTHERPETIC TRIGEMINAL NEURALGIA: ICD-10-CM

## 2018-07-19 DIAGNOSIS — E03.9 ACQUIRED HYPOTHYROIDISM: Primary | ICD-10-CM

## 2018-07-19 DIAGNOSIS — L43.8 ORAL LICHEN PLANUS: ICD-10-CM

## 2018-07-19 DIAGNOSIS — M25.512 CHRONIC LEFT SHOULDER PAIN: ICD-10-CM

## 2018-07-19 DIAGNOSIS — I10 ESSENTIAL HYPERTENSION: ICD-10-CM

## 2018-07-19 PROCEDURE — 84443 ASSAY THYROID STIM HORMONE: CPT

## 2018-07-19 PROCEDURE — 80048 BASIC METABOLIC PNL TOTAL CA: CPT

## 2018-07-19 RX ORDER — LIDOCAINE HYDROCHLORIDE 10 MG/ML
2 INJECTION, SOLUTION EPIDURAL; INFILTRATION; INTRACAUDAL; PERINEURAL ONCE
Qty: 2 ML | Refills: 0
Start: 2018-07-19 | End: 2018-10-31 | Stop reason: SDUPTHER

## 2018-07-19 RX ORDER — LEVOTHYROXINE SODIUM 75 UG/1
75 TABLET ORAL
Qty: 90 TAB | Refills: 1 | Status: SHIPPED | OUTPATIENT
Start: 2018-07-19 | End: 2018-07-20 | Stop reason: SDUPTHER

## 2018-07-19 RX ORDER — GABAPENTIN 100 MG/1
200 CAPSULE ORAL 3 TIMES DAILY
Qty: 720 CAP | Refills: 1 | Status: SHIPPED | OUTPATIENT
Start: 2018-07-19 | End: 2019-04-23 | Stop reason: DRUGHIGH

## 2018-07-19 RX ORDER — ALENDRONATE SODIUM 70 MG/1
70 TABLET ORAL
Qty: 13 TAB | Refills: 4 | Status: SHIPPED | OUTPATIENT
Start: 2018-07-19 | End: 2018-11-30 | Stop reason: SDUPTHER

## 2018-07-19 RX ORDER — TRIAMCINOLONE ACETONIDE 40 MG/ML
40 INJECTION, SUSPENSION INTRA-ARTICULAR; INTRAMUSCULAR ONCE
Qty: 1 ML | Refills: 0
Start: 2018-07-19 | End: 2018-10-31 | Stop reason: SDUPTHER

## 2018-07-19 RX ORDER — OXYBUTYNIN CHLORIDE 5 MG/1
5 TABLET ORAL
Qty: 90 TAB | Refills: 1 | Status: SHIPPED | OUTPATIENT
Start: 2018-07-19

## 2018-07-19 RX ORDER — LISINOPRIL 2.5 MG/1
2.5 TABLET ORAL DAILY
Qty: 90 TAB | Refills: 1 | Status: SHIPPED | OUTPATIENT
Start: 2018-07-19 | End: 2019-02-06 | Stop reason: SDUPTHER

## 2018-07-19 NOTE — MR AVS SNAPSHOT
Eliasclaribel Veronika 
 
 
 [address] [facility] 
[phone] Patient: Alex Hodgson MRN: [id number] BBE:9/0/1972 Visit Information Date & Time Provider Department Dept. Phone Encounter #  
 7/19/2018  1:45 PM Ashleigh Nicolas, [address] [phone] [id number] Follow-up Instructions Return in about 6 months (around 1/19/2019) for Annual Wellness Visit. Upcoming Health Maintenance Date Due DTaP/Tdap/Td series (1 - Tdap) 5/2/1950 ZOSTER VACCINE AGE 60> 3/2/1989 GLAUCOMA SCREENING Q2Y 5/2/1994 Pneumococcal 65+ Low/Medium Risk (1 of 2 - PCV13) 5/2/1994 Influenza Age 5 to Adult 8/1/2018 MEDICARE YEARLY EXAM 1/17/2019 Allergies as of 7/19/2018  Review Complete On: 7/19/2018 By: Tone Elizabeth LPN Severity Noted Reaction Type Reactions Voltaren [Diclofenac Sodium] High 11/28/2017   Side Effect Anaphylaxis [place] Citrus  11/28/2017    Other (comments) Lyrica [Pregabalin]  07/19/2018   Intolerance Drowsiness Neosporin [Benzalkonium Chloride]  11/28/2017   Topical Rash Current Immunizations  Reviewed on 1/11/2018 Name Date HPV 9/18/2017 Influenza Vaccine 9/18/2017 Not reviewed this visit You Were Diagnosed With   
  
 Codes Comments Essential hypertension    -  Primary ICD-10-CM: I10 
ICD-9-CM: 401.9 Acquired hypothyroidism     ICD-10-CM: E03.9 ICD-9-CM: 244.9 Neuropathic postherpetic trigeminal neuralgia     ICD-10-CM: B02.22 
ICD-9-CM: 053.12 Oral lichen planus     JSR-74-TM: L43.8 ICD-9-CM: 697.0 Chronic left shoulder pain     ICD-10-CM: M25.512, G89.29 ICD-9-CM: 719.41, 338.29 Vitals BP Pulse Temp Resp Height(growth percentile) Weight(growth percentile) 138/71 (BP 1 Location: Left arm, BP Patient Position: Sitting) 86 97.8 °F (36.6 °C) (Oral) 14 5' 5.4\" (1.661 m) 151 lb (68.5 kg) SpO2 BMI OB Status Smoking Status 99% 24.82 kg/m2 Postmenopausal Former Smoker Vitals History BMI and BSA Data Body Mass Index Body Surface Area  
 24.82 kg/m 2 1.78 m 2 Preferred Pharmacy Pharmacy Name Phone [facility] Vaishali Browning - [address] [facility] [phone] Your Updated Medication List  
  
   
This list is accurate as of 7/19/18  2:47 PM.  Always use your most recent med list.  
  
  
  
  
 alendronate 70 mg tablet Commonly known as:  FOSAMAX Take 1 Tab by mouth every seven (7) days. aspirin delayed-release 81 mg tablet Take  by mouth daily. CALTRATE PLUS PO Take 500 mg by mouth daily. doxycycline 100 mg tablet Commonly known as:  ADOXA Take 100 mg by mouth two (2) times a day. AS NEEDED FISH  mg Cap Generic drug:  Omega-3 Fatty Acids Take  by mouth.  
  
 gabapentin 100 mg capsule Commonly known as:  NEURONTIN Take 2 Caps by mouth three (3) times daily. Sometimes takes 300mg TID prn  
  
 levothyroxine 75 mcg tablet Commonly known as:  Jocelynn Prudent Take 1 Tab by mouth Daily (before breakfast). lidocaine (PF) 10 mg/mL (1 %) injection Commonly known as:  XYLOCAINE  
2 mL by Intra artICUlar route once for 1 dose. lisinopril 2.5 mg tablet Commonly known as:  Marge Ashley Take 1 Tab by mouth daily. oxybutynin 5 mg tablet Commonly known as:  CTVRTSRL Take 1 Tab by mouth daily as needed. triamcinolone acetonide 40 mg/mL injection Commonly known as:  KENALOG  
1 mL by Intra artICUlar route once for 1 dose. Prescriptions Sent to Pharmacy Refills  
 gabapentin (NEURONTIN) 100 mg capsule 1 Sig: Take 2 Caps by mouth three (3) times daily. Sometimes takes 300mg TID prn  
 Class: Normal  
 Pharmacy: [address] [place] [address] [facility] Gigi Neri, [address] [facility] Ph #: [phone]  Route: Oral  
 levothyroxine (UNITHROID) 75 mcg tablet 1  
 Sig: Take 1 Tab by mouth Daily (before breakfast). Class: Normal  
 [facility] [address] Ph #: [phone] Route: Oral  
 alendronate (FOSAMAX) 70 mg tablet 4 Sig: Take 1 Tab by mouth every seven (7) days. Class: Normal  
 [facility] [address] Ph #: [phone] Route: Oral  
 lisinopril (PRINIVIL, ZESTRIL) 2.5 mg tablet 1 Sig: Take 1 Tab by mouth daily. Class: Normal  
 [facility] [address] Ph #: [phone] Route: Oral  
 oxybutynin (DITROPAN) 5 mg tablet 1 Sig: Take 1 Tab by mouth daily as needed. Class: Normal  
 Pharmacy: [facility] [address] Ph #: [phone] Route: Oral  
  
We Performed the Following METABOLIC PANEL, BASIC [20833 CPT(R)] NE DRAIN/INJECT LARGE JOINT/BURSA [id number] CPT(R)] REFERRAL TO PHYSICAL THERAPY [PCR02 Custom] Comments: Will do physical therapy at home at [facility] and treat left shoulder pain TRIAMCINOLONE ACETONIDE INJ [ [facility]] TSH 3RD GENERATION [71633 CPT(R)] Follow-up Instructions Return in about 6 months (around 1/19/2019) for Annual Wellness Visit. To-Do List   
 07/19/2018 Imaging:  XR SHOULDER LT AP/LAT MIN 2 V Referral Information Referral ID Referred By Referred To  
  
 [id number] Gayla Ahuja Not Available Visits Status Start Date End Date 1 New Request 7/19/18 7/19/19 If your referral has a status of pending review or denied, additional information will be sent to support the outcome of this decision. Patient Instructions Shoulder Bursitis: Care Instructions Your Care Instructions Bursitis is inflammation of the bursa. A bursa is a small sac of fluid that cushions a joint and helps it move easily.  A bursa sits under the highest point of your shoulder. You can get bursitis by overusing your shoulder, which can happen with activities such as lifting, pitching a ball, or painting. Symptoms of bursitis include pain when you move your arm. Your arm may hurt when you try to lift it, and the pain can reach down the side of your arm. You may have trouble sleeping because of the pain. Bursitis usually gets better if you avoid the activity that caused it. If pain lasts or gets worse despite home treatment, your doctor may draw fluid from the bursa through a needle. This may relieve your pain and help your doctor know if you have an infection. If so, your doctor will prescribe antibiotics. If you have inflammation only, you may get a corticosteroid shot to reduce swelling and pain. Sometimes surgery is needed to drain or remove the bursa. Follow-up care is a key part of your treatment and safety. Be sure to make and go to all appointments, and call your doctor if you are having problems. It's also a good idea to know your test results and keep a list of the medicines you take. How can you care for yourself at home? · Put ice or a cold pack on your shoulder for 10 to 20 minutes at a time. Put a thin cloth between the ice and your skin. · After 3 days of using ice, use heat on your shoulder. You can use a hot water bottle, a heating pad set on low, or a warm, moist towel. Some doctors suggest alternating between hot and cold. · Rest your shoulder. Stop any activities that cause pain. Switch to activities that do not stress your shoulder. · Take your medicines exactly as prescribed. Call your doctor if you think you are having a problem with your medicine. · If your doctor recommends it, take anti-inflammatory medicines to reduce pain. These include ibuprofen (Advil, Motrin) and naproxen (Aleve). Read and follow all instructions on the label.  
· To prevent stiffness, gently move the shoulder joint through its full range of motion. As the pain gets better, keep doing range-of-motion exercises. Ask your doctor for exercises that will make the muscles around the shoulder joint stronger. Do these as directed. · You can slowly return to the activity that caused the pain, but do it with less effort until you can do it without pain or swelling. Be sure to warm up before and stretch after you do the activity. When should you call for help? Call your doctor now or seek immediate medical care if: 
  · You have a fever.  
  · You have increased swelling or redness in your shoulder.  
  · You cannot use your shoulder, or the pain in your shoulder gets worse.  
 Watch closely for changes in your health, and be sure to contact your doctor if: 
  · You have pain for 2 weeks or longer despite home treatment. Where can you learn more? Go to http://dez-cedric.info/. Enter M955 in the search box to learn more about \"Shoulder Bursitis: Care Instructions. \" Current as of: November 29, 2017 Content Version: 11.7 © 1516-8685 Mayvenn. Care instructions adapted under license by Wedit (which disclaims liability or warranty for this information). If you have questions about a medical condition or this instruction, always ask your healthcare professional. [place] [address] any warranty or liability for your use of this information. Introducing [facility] [place] [facility]! [facility] introduces Wedding Reality patient portal. Now you can access parts of your medical record, email your doctor's office, and request medication refills online. 1. In your internet browser, go to [email or web address] "[employer]"/Amedrixt 2. Click on the First Time User? Click Here link in the Sign In box. You will see the New Member Sign Up page. 3. Enter your Wedding Reality Access Code exactly as it appears below. You will not need to use this code after youve completed the sign-up process.  If you do not sign up before the expiration date, you must request a new code. · Think Big Analytics Access Code: [email or web address] Expires: 10/17/2018  1:54 PM 
 
4. Enter the last four digits of your Social Security Number (xxxx) and Date of Birth (mm/dd/yyyy) as indicated and click Submit. You will be taken to the next sign-up page. 5. Create a Think Big Analytics ID. This will be your Think Big Analytics login ID and cannot be changed, so think of one that is secure and easy to remember. 6. Create a [employer] password. You can change your password at any time. 7. Enter your Password Reset Question and Answer. This can be used at a later time if you forget your password. 8. Enter your e-mail address. You will receive e-mail notification when new information is available in [address] 9. Click Sign Up. You can now view and download portions of your medical record. 10. Click the Download Summary menu link to download a portable copy of your medical information. If you have questions, please visit the Frequently Asked Questions section of the Think Big Analytics website. Remember, Think Big Analytics is NOT to be used for urgent needs. For medical emergencies, dial 911. Now available from your iPhone and Android! Please provide this summary of care documentation to your next provider. Your primary care clinician is listed as Krystle Larose. If you have any questions after today's visit, please call [phone].

## 2018-07-19 NOTE — PATIENT INSTRUCTIONS
Shoulder Bursitis: Care Instructions  Your Care Instructions    Bursitis is inflammation of the bursa. A bursa is a small sac of fluid that cushions a joint and helps it move easily. A bursa sits under the highest point of your shoulder. You can get bursitis by overusing your shoulder, which can happen with activities such as lifting, pitching a ball, or painting. Symptoms of bursitis include pain when you move your arm. Your arm may hurt when you try to lift it, and the pain can reach down the side of your arm. You may have trouble sleeping because of the pain. Bursitis usually gets better if you avoid the activity that caused it. If pain lasts or gets worse despite home treatment, your doctor may draw fluid from the bursa through a needle. This may relieve your pain and help your doctor know if you have an infection. If so, your doctor will prescribe antibiotics. If you have inflammation only, you may get a corticosteroid shot to reduce swelling and pain. Sometimes surgery is needed to drain or remove the bursa. Follow-up care is a key part of your treatment and safety. Be sure to make and go to all appointments, and call your doctor if you are having problems. It's also a good idea to know your test results and keep a list of the medicines you take. How can you care for yourself at home? · Put ice or a cold pack on your shoulder for 10 to 20 minutes at a time. Put a thin cloth between the ice and your skin. · After 3 days of using ice, use heat on your shoulder. You can use a hot water bottle, a heating pad set on low, or a warm, moist towel. Some doctors suggest alternating between hot and cold. · Rest your shoulder. Stop any activities that cause pain. Switch to activities that do not stress your shoulder. · Take your medicines exactly as prescribed. Call your doctor if you think you are having a problem with your medicine.   · If your doctor recommends it, take anti-inflammatory medicines to reduce pain. These include ibuprofen (Advil, Motrin) and naproxen (Aleve). Read and follow all instructions on the label. · To prevent stiffness, gently move the shoulder joint through its full range of motion. As the pain gets better, keep doing range-of-motion exercises. Ask your doctor for exercises that will make the muscles around the shoulder joint stronger. Do these as directed. · You can slowly return to the activity that caused the pain, but do it with less effort until you can do it without pain or swelling. Be sure to warm up before and stretch after you do the activity. When should you call for help? Call your doctor now or seek immediate medical care if:    · You have a fever.     · You have increased swelling or redness in your shoulder.     · You cannot use your shoulder, or the pain in your shoulder gets worse.    Watch closely for changes in your health, and be sure to contact your doctor if:    · You have pain for 2 weeks or longer despite home treatment. Where can you learn more? Go to http://dze-cedric.info/. Enter M955 in the search box to learn more about \"Shoulder Bursitis: Care Instructions. \"  Current as of: November 29, 2017  Content Version: 11.7  © 4461-9503 InflaRx. Care instructions adapted under license by Bloggerce (which disclaims liability or warranty for this information). If you have questions about a medical condition or this instruction, always ask your healthcare professional. [place] [address] any warranty or liability for your use of this information.

## 2018-07-19 NOTE — PROGRESS NOTES
1. Have you been to the ER, urgent care clinic since your last visit? Hospitalized since your last visit? No    2. Have you seen or consulted any other health care providers outside of the [address] [place] [address] since your last visit? Include any pap smears or colon screening. No     Chief Complaint   Patient presents with    Hypertension     6 month follow up    Shoulder Pain     follow up on chronic left shoulder pain    Knee Pain     follow up on chronic right knee pain     Not fasting    Shingles vaccine- patient states she believes she received it about 10 years ago. Eye exam- has one scheduled for this month.

## 2018-07-19 NOTE — PROGRESS NOTES
Jayme Treviño [address] [facility] [place]. [place], [address] [place] [address]  [phone]             Date of visit: 7/19/2018   Subjective:      History obtained from:  the patient. Issa Bhatti is a 80 y.o. female who presents today for f/u chronic problems    Left shoulder pain really bad  Really hard to use her arm, can hardly hold dishes  Would never want surgery  Would like to do therapy  Would like injection, has only had one but really helped    Still has her 2 drinks at night, working on a bit less (smaller drinks)    Has lichen planus in mouth  Trouble with teeth, seeing dentist  He gives her doxy for the lichen planus, works well but needs it often    I had given her rx for tdap and shingrix  Needs rx for TDAP now, didn't get it  shingrix too high price  Has had zostavax though    Very careful not to fall  Gabapentin 200 TID but sometimes takes extra for facial pain  Not feeling dizzy now    bp very good at home 110s/60s  On lisinopil in part for the     Patient Active Problem List    Diagnosis Date Noted    Chronic renal disease, stage 3, moderately decreased glomerular filtration rate between 30-59 mL/min/1.73 square meter 07/19/2018    Osteopenia of multiple sites 02/02/2018    Atopic dermatitis 01/16/2018    Chronic left shoulder pain 01/16/2018    Female pattern hair loss 01/16/2018    OAB (overactive bladder) 11/29/2017    Excessive drinking alcohol 11/29/2017    Postmenopausal HRT (hormone replacement therapy) 45/58/8603    Oral lichen planus 24/47/7266    Balance problem 11/28/2017    Right hip pain 11/28/2017    Chronic foot pain, right 11/28/2017    Neuropathic postherpetic trigeminal neuralgia 11/28/2017    Acquired hypothyroidism 11/28/2017    Essential hypertension 11/28/2017     Current Outpatient Prescriptions   Medication Sig Dispense Refill    gabapentin (NEURONTIN) 100 mg capsule Take 2 Caps by mouth three (3) times daily.  Sometimes takes 300mg TID prn 720 Cap 1    levothyroxine (UNITHROID) 75 mcg tablet Take 1 Tab by mouth Daily (before breakfast). 90 Tab 1    alendronate (FOSAMAX) 70 mg tablet Take 1 Tab by mouth every seven (7) days. 13 Tab 4    lisinopril (PRINIVIL, ZESTRIL) 2.5 mg tablet Take 1 Tab by mouth daily. 90 Tab 1    oxybutynin (DITROPAN) 5 mg tablet Take 1 Tab by mouth daily as needed. 90 Tab 1    triamcinolone acetonide (KENALOG) 40 mg/mL injection 1 mL by Intra artICUlar route once for 1 dose. 1 mL 0    lidocaine, PF, (XYLOCAINE) 10 mg/mL (1 %) injection 2 mL by Intra artICUlar route once for 1 dose. 2 mL 0    doxycycline (ADOXA) 100 mg tablet Take 100 mg by mouth two (2) times a day. AS NEEDED      aspirin delayed-release 81 mg tablet Take  by mouth daily.  CALCIUM CARB/VIT D2/MINERALS (CALTRATE PLUS PO) Take 500 mg by mouth daily.  Omega-3 Fatty Acids (FISH OIL) 500 mg cap Take  by mouth. Allergies   Allergen Reactions    Voltaren [Diclofenac Sodium] Anaphylaxis    [place] Other (comments)    Lyrica [Pregabalin] Drowsiness    Neosporin [Benzalkonium Chloride] Rash     Past Medical History:   Diagnosis Date    Arthritis     Eczema     History of hormone replacement therapy 1987    Hypertension 0977    Lichen planus     Oral    Menopause 1987    Trigeminal neuralgia      Past Surgical History:   Procedure Laterality Date    HX BREAST BIOPSY Left 2014    Stereo    HX COLONOSCOPY  2012    HX KNEE REPLACEMENT Right 2014    HX ROTATOR CUFF REPAIR Right      Family History   Problem Relation Age of Onset    Other Mother      severe hot flashes    Heart Failure Mother 76    Breast Cancer Mother 76    Other Daughter      severe hot flashes    Heart Attack Father 46    Breast Cancer Maternal Aunt      Social History   Substance Use Topics    Smoking status: Former Smoker    Smokeless tobacco: Never Used    Alcohol use 8.4 oz/week     8 Glasses of wine, 6 Shots of liquor per week      Comment: occ.       Social History     Social History Narrative    Lives in [facility], independent living    Daughter Chaparrita Don lives nearby        Review of Systems  Card: denies chest pain  Pulm: denies shortness of breath      Objective:     Vitals:    07/19/18 1353   BP: 138/71   Pulse: 86   Resp: 14   Temp: 97.8 °F (36.6 °C)   TempSrc: Oral   SpO2: 99%   Weight: 151 lb (68.5 kg)   Height: 5' 5.4\" (1.661 m)     Body mass index is 24.82 kg/(m^2). General: stated age, obese and in NAD  Neck: supple, symmetrical, trachea midline, no adenopathy and thyroid: not enlarged, symmetric, no tenderness/mass/nodules  Lungs:  clear to auscultation w/o rales, rhonchi, wheezes w/normal effort and no use of accessory muscles of respiration   Heart: regular rate and rhythm, S1, S2 normal, no murmur, click, rub or gallop  Abdomen: soft, nontender, no masses  Ext:  No edema noted. Lymph: no cervical adenopathy appreciated  Skin:  Normal. and no rash or abnormalities   Psych: alert and oriented to person, place, time and situation and Speech: appropriate quality, quantity and organization of sentences  MSK: left shoulder active abduction only about 75 deg, passive to 90, too much pain after that     Assessment/Plan:       ICD-10-CM ICD-9-CM    1. Acquired hypothyroidism E03.9 244.9 TSH 3RD GENERATION   2. Neuropathic postherpetic trigeminal neuralgia B02.22 053.12    3. Oral lichen planus A34.5 556.3    4. Chronic left shoulder pain M25.512 719.41 TRIAMCINOLONE ACETONIDE INJ    G89.29 338.29 triamcinolone acetonide (KENALOG) 40 mg/mL injection      lidocaine, PF, (XYLOCAINE) 10 mg/mL (1 %) injection      NV DRAIN/INJECT LARGE JOINT/BURSA      REFERRAL TO PHYSICAL THERAPY      XR SHOULDER LT AP/LAT MIN 2 V   5. Chronic renal disease, stage 3, moderately decreased glomerular filtration rate between 30-59 mL/min/1.73 square meter N18.3 585.3    6.  Essential hypertension Z96 610.6 METABOLIC PANEL, BASIC        Orders Placed This Encounter    XR SHOULDER LT AP/LAT MIN 2 V    TSH 3RD GENERATION    METABOLIC PANEL, BASIC    REFERRAL TO PHYSICAL THERAPY    TRIAMCINOLONE ACETONIDE INJ    20610 - DRAIN/INJECT LARGE JOINT/BURSA    gabapentin (NEURONTIN) 100 mg capsule    DISCONTD: diphtheria-pertussis, acellular,-tetanus (BOOSTRIX TDAP) 2.5-8-5 Lf-mcg-Lf/0.5mL susp susp    levothyroxine (UNITHROID) 75 mcg tablet    alendronate (FOSAMAX) 70 mg tablet    lisinopril (PRINIVIL, ZESTRIL) 2.5 mg tablet    oxybutynin (DITROPAN) 5 mg tablet    triamcinolone acetonide (KENALOG) 40 mg/mL injection    lidocaine, PF, (XYLOCAINE) 10 mg/mL (1 %) injection       Mainly on lisinopril for kidneys, not bp, will continue though bp low not dizzy  Labs for chronic problems  Ok to use the extra gabapentin 100mg dose when trigeminal neuralgia bad  Ok to continue doxy prn for oral lichen planus, has worked for her  Shoulder injection and refer PT  [facility]  OFFICE PROCEDURE PROGRESS NOTE        Chart reviewed for the following:   Adalid Mckeon MD, have reviewed the History, Physical and updated the Allergic reactions for [place] [address] performed immediately prior to start of procedure:   Adalid Mckeon MD, have performed the following reviews on Char[place] Arrow prior to the start of the procedure:            * Patient was identified by name and date of birth   * Agreement on procedure being performed was verified  * Risks and Benefits explained to the patient  * Procedure site verified and marked as necessary  * Patient was positioned for comfort  * Consent was signed and verified     Time: 255pm      Date of procedure: 7/19/2018    Procedure performed by:  Isma Robins MD    Provider assisted by:  none    Patient assisted by: self    How tolerated by patient: tolerated the procedure well with no complications    Post Procedural Pain Scale: feels better    Comments: After informed consent, the left shoulder joint was prepped with betadine, numbed with ethyl chloride, and was injected into the subacromial space from a posterior approach using no-touch sterile technique with 2 mL 2% lidocaine/ 40 mg trimacinolone. Patient tolerated procedure well and there were no complications. Discussed the diagnosis and plan and she expressed understanding. Follow-up Disposition:  Return in about 6 months (around 1/19/2019) for Annual Wellness Visit.     Reyes Nowak MD

## 2018-07-19 NOTE — LETTER
7/19/2018 5:52 PM 
 
Ms. Norma Parker [address] [facility] [address] [id number] Dear Norma Parker: 
 
Please find your most recent results below. Resulted Orders XR SHOULDER LT AP/LAT MIN 2 V  
 EXAM:  XR SHOULDER LT AP/LAT MIN 2 V 
 
FINDINGS: Three views of the left shoulder demonstrate no fracture, dislocation 
or other acute abnormality. The bones are mildly osteopenic. Degenerative 
changes are present. IMPRESSION:   
No acute fracture or dislocation. Moderate degenerative changes at the glenohumeral articulation. Somewhat high riding humeral head relative to the acromion may suggest chronic 
rotator cuff tear. RECOMMENDATIONS: 
Xray shows degenerative changes/arthritis. I hope the shot helped! Please call me if you have any questions: [phone] Sincerely, Renée Person MD

## 2018-07-20 LAB
BUN SERPL-MCNC: 23 MG/DL (ref 8–27)
BUN/CREAT SERPL: 20 (ref 12–28)
CALCIUM SERPL-MCNC: 9.5 MG/DL (ref 8.7–10.3)
CHLORIDE SERPL-SCNC: 103 MMOL/L (ref 96–106)
CO2 SERPL-SCNC: 20 MMOL/L (ref 20–29)
CREAT SERPL-MCNC: 1.14 MG/DL (ref 0.57–1)
GLUCOSE SERPL-MCNC: 97 MG/DL (ref 65–99)
INTERPRETATION: NORMAL
POTASSIUM SERPL-SCNC: 4.7 MMOL/L (ref 3.5–5.2)
SODIUM SERPL-SCNC: 140 MMOL/L (ref 134–144)
TSH SERPL DL<=0.005 MIU/L-ACNC: 0.13 UIU/ML (ref 0.45–4.5)

## 2018-07-20 RX ORDER — LEVOTHYROXINE SODIUM 50 UG/1
50 TABLET ORAL EVERY OTHER DAY
Qty: 45 TAB | Refills: 1 | Status: SHIPPED | OUTPATIENT
Start: 2018-07-20 | End: 2018-11-15 | Stop reason: SDUPTHER

## 2018-07-20 RX ORDER — LEVOTHYROXINE SODIUM 75 UG/1
75 TABLET ORAL EVERY OTHER DAY
Qty: 45 TAB | Refills: 1 | Status: SHIPPED | OUTPATIENT
Start: 2018-07-20 | End: 2018-11-15 | Stop reason: SDUPTHER

## 2018-07-21 NOTE — PROGRESS NOTES
Sent in letter:  Thyroid is a bit off. I recommend you alternate 75 and 50mcg pills and will call in that dose. Kidney test is stable, and sugar/electrolytes were fine! We should recheck thyroid in 3-6 months.

## 2018-07-23 ENCOUNTER — TELEPHONE (OUTPATIENT)
Dept: FAMILY MEDICINE CLINIC | Age: 83
End: 2018-07-23

## 2018-07-23 NOTE — TELEPHONE ENCOUNTER
Ozzie Gorman is calling on behalf of patient from [facility] in regards to, being able to see patient twice a week for Physical Therapy for about 8weeks with a verbal order. She states she will be working with patient for her shoulder as well as balance and mobility.       Best call back [phone]

## 2018-07-30 ENCOUNTER — TELEPHONE (OUTPATIENT)
Dept: FAMILY MEDICINE CLINIC | Age: 83
End: 2018-07-30

## 2018-07-30 NOTE — TELEPHONE ENCOUNTER
Patient is requesting a call back from nurse and Dr Felisa Valencia, she has some questions regarding her Thyroid blood results and her anxiety. She received in mail.   Best call back : [phone]  Last seen/labs : July 19, 2018

## 2018-07-30 NOTE — TELEPHONE ENCOUNTER
The spells may actually be due to her thyroid medication needing to be adjusted. Advice sounds good.

## 2018-07-30 NOTE — TELEPHONE ENCOUNTER
Pt wants to know 2 things. What does alternate between 75 mcg and 50 mcg. Advised that she just needs to take 1 pill one day and the next day the other pill and do that everyday. Pt states understanding. 2nd is that she thinks she is having some anxiety spells she feels like her heart races but her pulse is only 80-88. She said it isn't panic attacks. She is just has noticed spells were she feels a little stressed she denies any CP, SOB, radiating pain or even sweating or nausea. She said that if she notices it she sits herself down talks to herself and it passes. It has been going on for about 1-2 weeks. She will continue to monitor what is going on at the time she has this. She will call back to make an appt if it continues. I recommended that she go to ER is she has any CP, SOB, radiating pain or even sweating or nausea and pt states understanding.   I will double check with Dr Johnny Mukherjee and get back with her once I hear from MD.

## 2018-08-16 ENCOUNTER — TELEPHONE (OUTPATIENT)
Dept: FAMILY MEDICINE CLINIC | Age: 83
End: 2018-08-16

## 2018-08-16 NOTE — TELEPHONE ENCOUNTER
Called pt and she states that her fingernails have been \"splitting/faking\" for about a year now. She wants to know if there is any med that she is on that would be causing that to happen. She ask that I leave a message on her VM when I call back.     Pt is scheduled for a f/u appt 10/19/18

## 2018-08-16 NOTE — TELEPHONE ENCOUNTER
Please call patient. [phone]. She said she wants to talk about her medications that affect her fingernails.

## 2018-08-22 NOTE — TELEPHONE ENCOUNTER
I don't think any of her meds would affect her nails unless her thyroid level is off.  We will check at her upcoming appt

## 2018-09-05 ENCOUNTER — TELEPHONE (OUTPATIENT)
Dept: FAMILY MEDICINE CLINIC | Age: 83
End: 2018-09-05

## 2018-09-05 NOTE — TELEPHONE ENCOUNTER
Spoke with Kalyn Hogan who states patient is going to the beach next week which is why they need to put on hold for a week. They will also need a verbal ok to extend the home PT for the week of the 17 th. Kalyn Hogan states patients rom is getting better in her shoulder, however, her balance is off and will need to work on that. Kalyn Hogan advises to disregard the script for orthosis as medicare will not pay for.

## 2018-09-05 NOTE — TELEPHONE ENCOUNTER
----- Message from Burt Yip sent at 9/5/2018 12:34 PM EDT -----  Regarding: Dr. Dipika Velazquez a Physical Therapist from [facility] would need a verbal Okay to place PT on hold for Home Health therapy next week. Physical therapist would also need an extension for  visits for the week of the 17th for in home physical therapy . Pt would also  Need a prescription faxed to [phone] saying \" assessment for orthosis for both feet and put the diagnosis to be included with the description OA and Osteopenia and Pain\". Nicolás Velazquez Phone contact [phone].

## 2018-09-19 NOTE — TELEPHONE ENCOUNTER
Spoke to [place] and she reports that the pt is having bilat foot pain with an unsteady gait. They would like to extend PT for that. They also request an order for eval for orthosis for both feet. Th order needs to be faxed to [facility]. [place] said that medicare doesn't cover the orthotics and pt is aware that she will have to pay out of pocket. Advised ok to extend the PT. Dr You Larkin not here today so order won't be faxed until tomorrow.

## 2018-09-19 NOTE — TELEPHONE ENCOUNTER
Erica Burger  From [facility]  [phone]  Needs a verbal ok to continue home physical therapy for balance

## 2018-10-09 ENCOUNTER — TELEPHONE (OUTPATIENT)
Dept: FAMILY MEDICINE CLINIC | Age: 83
End: 2018-10-09

## 2018-10-09 NOTE — TELEPHONE ENCOUNTER
----- Message from Jannettedonato Byrd sent at 10/9/2018 11:28 AM EDT -----  Regarding: Dr. Zuleika Wells is calling from [facility]. Pt would Ptwould like to be discharged on 10/11 from [facility] health PT. Comfort Frye would need a verbal order.  Roula Ruff contact [phone]

## 2018-10-09 NOTE — TELEPHONE ENCOUNTER
Spoke to [place] and she states that pt would like to just do her exercises on her own. Pt doesn't want them coming anymore. Advised that is fine. They can d/c PT.

## 2018-10-19 ENCOUNTER — HOSPITAL ENCOUNTER (OUTPATIENT)
Dept: LAB | Age: 83
Discharge: HOME | End: 2018-10-19
Payer: MEDICARE

## 2018-10-19 ENCOUNTER — OFFICE VISIT (OUTPATIENT)
Dept: FAMILY MEDICINE CLINIC | Age: 83
End: 2018-10-19

## 2018-10-19 VITALS
HEART RATE: 81 BPM | DIASTOLIC BLOOD PRESSURE: 72 MMHG | RESPIRATION RATE: 18 BRPM | OXYGEN SATURATION: 97 % | SYSTOLIC BLOOD PRESSURE: 142 MMHG | WEIGHT: 153.8 LBS | HEIGHT: 65 IN | TEMPERATURE: 97.7 F | BODY MASS INDEX: 25.62 KG/M2

## 2018-10-19 DIAGNOSIS — G89.29 CHRONIC RIGHT-SIDED LOW BACK PAIN WITHOUT SCIATICA: ICD-10-CM

## 2018-10-19 DIAGNOSIS — R03.0 TRANSIENT ELEVATED BLOOD PRESSURE: ICD-10-CM

## 2018-10-19 DIAGNOSIS — E55.9 VITAMIN D INSUFFICIENCY: ICD-10-CM

## 2018-10-19 DIAGNOSIS — Z23 ENCOUNTER FOR IMMUNIZATION: ICD-10-CM

## 2018-10-19 DIAGNOSIS — M25.551 RIGHT HIP PAIN: ICD-10-CM

## 2018-10-19 DIAGNOSIS — M54.50 CHRONIC RIGHT-SIDED LOW BACK PAIN WITHOUT SCIATICA: ICD-10-CM

## 2018-10-19 DIAGNOSIS — L43.8 ORAL LICHEN PLANUS: ICD-10-CM

## 2018-10-19 DIAGNOSIS — R26.89 BALANCE PROBLEM: ICD-10-CM

## 2018-10-19 DIAGNOSIS — Z13.220 LIPID SCREENING: ICD-10-CM

## 2018-10-19 DIAGNOSIS — M85.89 OSTEOPENIA OF MULTIPLE SITES: ICD-10-CM

## 2018-10-19 DIAGNOSIS — G47.00 INSOMNIA, UNSPECIFIED TYPE: ICD-10-CM

## 2018-10-19 DIAGNOSIS — E03.9 ACQUIRED HYPOTHYROIDISM: Primary | ICD-10-CM

## 2018-10-19 DIAGNOSIS — B02.22 NEUROPATHIC POSTHERPETIC TRIGEMINAL NEURALGIA: ICD-10-CM

## 2018-10-19 DIAGNOSIS — N39.3 STRESS INCONTINENCE: ICD-10-CM

## 2018-10-19 PROCEDURE — 80061 LIPID PANEL: CPT

## 2018-10-19 PROCEDURE — 80053 COMPREHEN METABOLIC PANEL: CPT

## 2018-10-19 PROCEDURE — 85025 COMPLETE CBC W/AUTO DIFF WBC: CPT

## 2018-10-19 PROCEDURE — 84443 ASSAY THYROID STIM HORMONE: CPT

## 2018-10-19 PROCEDURE — 82306 VITAMIN D 25 HYDROXY: CPT

## 2018-10-19 PROCEDURE — 36415 COLL VENOUS BLD VENIPUNCTURE: CPT

## 2018-10-19 NOTE — PROGRESS NOTES
[address] [facility] [place]. [place], [address] 
[phone] Date of visit: 10/19/18 Subjective:  
  
History obtained from:  the patientTato Torres is a 80 y.o. female who presents today for chronic problems Needs thyroid checked, we had adjusted dose. Feeling well Does PT exercises at home, alternates legs and arms (takes a lot of time) The left shoulder is much better, still hurts some but much better The shoulder injection did help quite a bit Has had injections in back of hip before and physical therapist thought she should try that and see how much it helps her walking When she walks she favors it As the day goes on her right hip gets heavy, muscles aches or feels tired more than pain Willing to get flu shot today Not sure about tdap, had something at pharmacy we will check ti see what it was 
(my nurse called, laura had tdap but not prevnar) Lichen planus oral breaking out now, goes to dentist soon The trigeminal neuralgia has good and bad days Sometimes takes extra gabapentin Used to be on 300 TID, now mostly 200 TID but occasionally takes 300mg if pain bad Stress incontinence when she stands up Won't feel like she needs to go but then comes out whens he stands Wonders what she can take to help her go to sleep, mind gets busy Went to [facility] for evalation for orthotic. He adjusted her orthotic a bit Waiting to get the new one Had bad gas Wed night from both ends, stomach was very bloated, sore to move Never had that before No changes in diet or meds Took gasx pepto and tums and finally got better this am 
 
 
Patient Active Problem List  
 Diagnosis Date Noted  Chronic renal disease, stage 3, moderately decreased glomerular filtration rate between 30-59 mL/min/1.73 square meter ([facility] [address] 07/19/2018  Osteopenia of multiple sites 02/02/2018  Atopic dermatitis 01/16/2018  Chronic left shoulder pain 01/16/2018  Female pattern hair loss 01/16/2018  OAB (overactive bladder) 11/29/2017  Excessive drinking alcohol 11/29/2017  Postmenopausal HRT (hormone replacement therapy) 31/46/2761  
 Oral lichen planus 37/98/8964  Balance problem 11/28/2017  Right hip pain 11/28/2017  Chronic foot pain, right 11/28/2017  Neuropathic postherpetic trigeminal neuralgia 11/28/2017  Acquired hypothyroidism 11/28/2017  Essential hypertension 11/28/2017 Current Outpatient Medications Medication Sig Dispense Refill  OTHER eval for orthosis both feet for bilat foot pain and unsteady gait. Fax to [facility] [phone]. DX bilat foot pain M79.671 and unsteady gait R26.81 1 Each 1  
 levothyroxine (SYNTHROID) 50 mcg tablet Take 1 Tab by mouth every other day. Alternate with 75 45 Tab 1  
 levothyroxine (UNITHROID) 75 mcg tablet Take 1 Tab by mouth every other day. Alternate with 50mcg 45 Tab 1  
 gabapentin (NEURONTIN) 100 mg capsule Take 2 Caps by mouth three (3) times daily. Sometimes takes 300mg TID prn 720 Cap 1  
 alendronate (FOSAMAX) 70 mg tablet Take 1 Tab by mouth every seven (7) days. 13 Tab 4  
 lisinopril (PRINIVIL, ZESTRIL) 2.5 mg tablet Take 1 Tab by mouth daily. 90 Tab 1  
 oxybutynin (DITROPAN) 5 mg tablet Take 1 Tab by mouth daily as needed. 90 Tab 1  
 doxycycline (ADOXA) 100 mg tablet Take 100 mg by mouth two (2) times a day. AS NEEDED  aspirin delayed-release 81 mg tablet Take  by mouth daily.  CALCIUM CARB/VIT D2/MINERALS (CALTRATE PLUS PO) Take 500 mg by mouth daily.  Omega-3 Fatty Acids (FISH OIL) 500 mg cap Take  by mouth. Allergies Allergen Reactions  Voltaren [Diclofenac Sodium] Anaphylaxis  [place] Other (comments)  Lyrica [Pregabalin] Drowsiness  Neosporin [[place] Chloride] Rash Past Medical History:  
Diagnosis Date  Arthritis  Eczema  History of hormone replacement therapy 1987  Hypertension 2009  Lichen planus Oral  
 Menopause 1987  Trigeminal neuralgia Past Surgical History:  
Procedure Laterality Date  HX BREAST BIOPSY Left 2014 Stereo  HX COLONOSCOPY  2012  HX KNEE REPLACEMENT Right 2014  HX ROTATOR CUFF REPAIR Right Family History Problem Relation Age of Onset  Other Mother   
     severe hot flashes  Heart Failure Mother 76 Daysi Moise Mother 76  Other Daughter   
     severe hot flashes  Heart Attack Father 46  Breast Cancer Maternal Aunt Social History Tobacco Use  Smoking status: Former Smoker  Smokeless tobacco: Never Used Substance Use Topics  Alcohol use: Yes Alcohol/week: 8.4 oz Types: 8 Glasses of wine, 6 Shots of liquor per week Comment: occ. Social History Social History Narrative Lives in [facility], independent living Daughter Lukasz Solis lives nearby Review of Systems Gen: denies fever Card: denies chest pain Pulm: denies shortness of breath Objective:  
 
Vitals:  
 10/19/18 1152 10/19/18 1222 BP: 157/75 142/72 Pulse: 81 Resp: 18 Temp: 97.7 °F (36.5 °C) TempSrc: Oral   
SpO2: 97% Weight: 153 lb 12.8 oz (69.8 kg) Height: 5' 5.4\" (1.661 m) Body mass index is 25.28 kg/m². General: stated age, well developed, well nourished and in NAD Neck: supple, symmetrical, trachea midline, no adenopathy and thyroid: not enlarged, symmetric, no tenderness/mass/nodules Lungs:  clear to auscultation w/o rales, rhonchi, wheezes w/normal effort and no use of accessory muscles of respiration Heart: regular rate and rhythm, S1, S2 normal, no murmur, click, rub or gallop Abdomen: soft, nontender, no masses Ext:  No edema noted. MSK: no pain with rotation right hip and has normal ROM. Mildly tender right buttock and right paraspinous lumbar muscles without bony tenderness or deformity Lymph: no cervical adenopathy appreciated Skin:  Normal. and no rash or abnormalities Psych: alert and oriented to person, place, time and situation and Speech: appropriate quality, quantity and organization of sentences Assessment/Plan: ICD-10-CM ICD-9-CM 1. Acquired hypothyroidism E03.9 244.9 TSH 3RD GENERATION 2. Stress incontinence N39.3 [id number] 3. Insomnia, unspecified type G47.00 780.52   
4. Osteopenia of multiple sites M85.89 733.90   
5. Neuropathic postherpetic trigeminal neuralgia B02.22 053.12 CBC WITH AUTOMATED DIFF  
   METABOLIC PANEL, COMPREHENSIVE 6. Encounter for immunization Z23 V03.89 INFLUENZA VACCINE INACTIVATED (IIV), SUBUNIT, ADJUVANTED, IM  
   ADMIN INFLUENZA VIRUS VAC 7. Oral lichen planus K62.8 523.2 8. Balance problem R26.89 781.99   
9. Right hip pain M25.551 719.45 XR HIP RT W OR WO PELV 2-3 VWS 10. Chronic right-sided low back pain without sciatica M54.5 724.2 XR SPINE LUMB 2 OR 3 V  
 G89.29 338.29   
11. Transient elevated blood pressure R03.0 796.2 CBC WITH AUTOMATED DIFF  
   METABOLIC PANEL, COMPREHENSIVE 12. Vitamin D insufficiency E55.9 268.9 VITAMIN D, 25 HYDROXY 13. Lipid screening Z13.220 V77.91 LIPID PANEL Orders Placed This Encounter  XR HIP RT W OR WO PELV 2-3 VWS  XR SPINE LUMB 2 OR 3 V  
 Influenza Vaccine Inactivated (IIV)(FLUAD), Subunit, Adjuvanted, IM, (23150)  CBC WITH AUTOMATED DIFF  
 METABOLIC PANEL, COMPREHENSIVE  LIPID PANEL  
 VITAMIN D, 25 HYDROXY  TSH 3RD GENERATION  
 CVD REPORT  CKD REPORT  
 Administration fee () for Medicare insured patients  pneumococcal 13 dulce conj dip (PREVNAR-13) 0.5 mL syrg injection Chronic problems stable, no med changes Discussed pelvic pt as a treatment for the stress incontinence,she will consider and will let me know if wanting referral 
Xray back and right hip as she is having pain there all the time. Discussed the diagnosis and plan and she expressed understanding. Follow-up Disposition: 
Return in about 3 months (around 1/19/2019) for Annual Wellness Visit.  
 
Alfonso Mckeon MD

## 2018-10-19 NOTE — PROGRESS NOTES
Chief Complaint Patient presents with  Hypothyroidism  
  follow up  Pain (Chronic) follow up left shoulder pain  Hip Pain  
  right , asking about steroid injection Reviewed Record in preparation for visit and have obtained necessary documentation. Identified pt with two pt identifiers (Name @ ) Health Maintenance Due Topic  DTaP/Tdap/Td series (1 - Tdap)  Shingrix Vaccine Age 50> (1 of 2)  GLAUCOMA SCREENING Q2Y  Pneumococcal 65+ Low/Medium Risk (1 of 2 - PCV13)  Influenza Age 5 to Adult 1. Have you been to the ER, urgent care clinic since your last visit? Hospitalized since your last visit? No 
 
2. Have you seen or consulted any other health care providers outside of the [address] [place] [address] since your last visit? Include any pap smears or colon screening.  No

## 2018-10-19 NOTE — LETTER
10/20/2018 6:51 AM 
 
Ms. Norma Salcido [facility] [address] [phone] Dear Norma Parker: 
 
Please find your most recent results below. Resulted Orders CBC WITH AUTOMATED DIFF Result Value Ref Range WBC 7.8 3.4 - 10.8 x10E3/uL  
 RBC 4.63 3.77 - 5.28 x10E6/uL HGB 13.6 11.1 - 15.9 g/dL HCT 40.4 34.0 - 46.6 % MCV 87 79 - 97 fL  
 MCH 29.4 26.6 - 33.0 pg  
 MCHC 33.7 31.5 - 35.7 g/dL  
 RDW 14.5 12.3 - 15.4 % PLATELET 553 373 - 762 x10E3/uL NEUTROPHILS 76 Not Estab. % Lymphocytes 14 Not Estab. % MONOCYTES 8 Not Estab. % EOSINOPHILS 2 Not Estab. % BASOPHILS 0 Not Estab. %  
 ABS. NEUTROPHILS 5.9 1.4 - 7.0 x10E3/uL Abs Lymphocytes 1.1 0.7 - 3.1 x10E3/uL  
 ABS. MONOCYTES 0.6 0.1 - 0.9 x10E3/uL  
 ABS. EOSINOPHILS 0.1 0.0 - 0.4 x10E3/uL  
 ABS. BASOPHILS 0.0 0.0 - 0.2 x10E3/uL IMMATURE GRANULOCYTES 0 Not Estab. %  
 ABS. IMM. GRANS. 0.0 0.0 - 0.1 x10E3/uL Narrative Performed at:  [address]  [id number] : Erik Bui MD, Phone:  [phone] METABOLIC PANEL, COMPREHENSIVE Result Value Ref Range Glucose 98 65 - 99 mg/dL BUN 18 8 - 27 mg/dL Creatinine 1.03 (H) 0.57 - 1.00 mg/dL GFR est non-AA 48 (L) >59 mL/min/1.73 GFR est AA 56 (L) >59 mL/min/1.73  
 BUN/Creatinine ratio 17 12 - 28 Sodium 138 134 - 144 mmol/L Potassium 4.7 3.5 - 5.2 mmol/L Chloride 100 96 - 106 mmol/L  
 CO2 21 20 - 29 mmol/L Calcium 10.2 8.7 - 10.3 mg/dL Protein, total 7.1 6.0 - 8.5 g/dL Albumin 4.3 3.5 - 4.7 g/dL GLOBULIN, TOTAL 2.8 1.5 - 4.5 g/dL A-G Ratio 1.5 1.2 - 2.2 Bilirubin, total 0.6 0.0 - 1.2 mg/dL Alk. phosphatase 82 39 - 117 IU/L  
 AST (SGOT) 16 0 - 40 IU/L  
 ALT (SGPT) 8 0 - 32 IU/L  
LIPID PANEL Result Value Ref Range Cholesterol, total 232 (H) 100 - 199 mg/dL Triglyceride 103 0 - 149 mg/dL HDL Cholesterol 87 >39 mg/dL VLDL, calculated 21 5 - 40 mg/dL LDL, calculated 124 (H) 0 - 99 mg/dL VITAMIN D, 25 HYDROXY Result Value Ref Range VITAMIN D, 25-HYDROXY 33.9 30.0 - 100.0 ng/mL TSH 3RD GENERATION Result Value Ref Range TSH 1.660 0.450 - 4.500 uIU/mL RECOMMENDATIONS: 
All of your labs were good, including thyroid, cholesterol, blood counts, vitamin D, kidney, liver, sugar, and electrolytes. Keep up the good work with healthy lifestyle! Please call me if you have any questions: [phone] Sincerely, Manuel Camilo MD

## 2018-10-19 NOTE — PATIENT INSTRUCTIONS
[facility] Patrica Angelucci, physical therapists [address] [place] Phone: [phone] Consider a subscription to the online program [employer] [place] [employer] [email or web address] 
 
Consider the \"sleep with me\" podcast 
 
Sleep Hygiene Tips Proper sleep hygiene should accompany any behavioral method. The term sleep hygiene is used to describe simple behaviors that may help everyone improve their sleep. These include: 
Establish a regular time for going to bed and getting up in the morning. Stick to this schedule even on weekends and during vacations. Use the bed for sleep and sexual relations only, not for reading, watching television, or working. Excessive time in bed disrupts sleep. Avoid naps, especially in the evening. Exercise before dinner. A low point in energy occurs a few hours after exercise; sleep will then come more easily. Exercising close to bedtime, however, may increase alertness. Take a hot bath about 1.5 - 2 hours before bedtime. This alters the body's core temperature rhythm and helps people fall asleep more easily and more continuously. (Taking a bath shortly before bed increases alertness.) Do something relaxing in the 30 minutes before bedtime. Reading, meditation, and a leisurely walk are all appropriate activities. Keep the bedroom relatively cool and well ventilated. Do not look at the clock. Obsessing over time will just make it more difficult to sleep. Go over song lyrics, a scripture, or a recipe in your mind while you are trying to fall asleep Eat light meals, and schedule dinner 4 - 5 hours before bedtime. A light snack before bedtime can help sleep, but a large meal may have the opposite effect. Spend a half hour in the sun each day. The best time is early in the day. (Take precautions against overexposure to sunlight by wearing protective clothing and sunscreen.) Avoid fluids just before bedtime so that sleep is not disturbed by the need to urinate. Avoid caffeine in the hours before sleep. If still awake after 15 - 20 minutes, go into another room, read or do a quiet activity using dim lighting until feeling very sleepy. (Don't watch television or use bright lights.) If distracted by a sleeping bed partner, moving to the couch or a spare bed for a couple of nights might be helpful. If a specific worry is keeping one awake, thinking of the problem in terms of images rather than in words may allow a person to fall asleep more quickly and to wake up with less anxiety. Melatonin 3mg would be safe Learning About How to Have a Healthy Back What causes back pain? Back pain is often caused by overuse, strain, or injury. For example, people often hurt their backs playing sports or working in the yard, being jolted in a car accident, or lifting something too heavy. Aging plays a part too. Your bones and muscles tend to lose strength as you age, which makes injury more likely. The spongy discs between the bones of the spine (vertebrae) may suffer from wear and tear and no longer provide enough cushion between the bones. A disc that bulges or breaks open (herniated disc) can press on nerves, causing back pain. In some people, back pain is the result of arthritis, broken vertebrae caused by bone loss (osteoporosis), illness, or a spine problem. Although most people have back pain at one time or another, there are steps you can take to make it less likely. How can you have a healthy back? Reduce stress on your back through good posture Slumping or slouching alone may not cause low back pain. But after the back has been strained or injured, bad posture can make pain worse. · Sleep in a position that maintains your back's normal curves and on a mattress that feels comfortable.  Sleep on your side with a pillow between your knees, or sleep on your back with a pillow under your knees. These positions can reduce strain on your back. · Stand and sit up straight. \"Good posture\" generally means your ears, shoulders, and hips are in a straight line. · If you must stand for a long time, put one foot on a stool, ledge, or box. Switch feet every now and then. · Sit in a chair that is low enough to let you place both feet flat on the floor with both knees nearly level with your hips. If your chair or desk is too high, use a footrest to raise your knees. Place a small pillow, a rolled-up towel, or a lumbar roll in the curve of your back if you need extra support. · Try a kneeling chair, which helps tilt your hips forward. This takes pressure off your lower back. · Try sitting on an exercise ball. It can rock from side to side, which helps keep your back loose. · When driving, keep your knees nearly level with your hips. Sit straight, and drive with both hands on the steering wheel. Your arms should be in a slightly bent position. Reduce stress on your back through careful lifting · Squat down, bending at the hips and knees only. If you need to, put one knee to the floor and extend your other knee in front of you, bent at a right angle (half kneeling). · Press your chest straight forward. This helps keep your upper back straight while keeping a slight arch in your low back. · Hold the load as close to your body as possible, at the level of your belly button (navel). · Use your feet to change direction, taking small steps. · Lead with your hips as you change direction. Keep your shoulders in line with your hips as you move. · Set down your load carefully, squatting with your knees and hips only. Exercise and stretch your back · Do some exercise on most days of the week, if your doctor says it is okay. You can walk, run, swim, or cycle. · Stretch your back muscles. Here are a few exercises to try: ? Lie on your back, and gently pull one bent knee to your chest. Put that foot back on the floor, and then pull the other knee to your chest. 
? Do pelvic tilts. Lie on your back with your knees bent. Tighten your stomach muscles. Pull your belly button (navel) in and up toward your ribs. You should feel like your back is pressing to the floor and your hips and pelvis are slightly lifting off the floor. Hold for 6 seconds while breathing smoothly. ? Sit with your back flat against a wall. · Keep your core muscles strong. The muscles of your back, belly (abdomen), and buttocks support your spine. ? Pull in your belly and imagine pulling your navel toward your spine. Hold this for 6 seconds, then relax. Remember to keep breathing normally as you tense your muscles. ? Do curl-ups. Always do them with your knees bent. Keep your low back on the floor, and curl your shoulders toward your knees using a smooth, slow motion. Keep your arms folded across your chest. If this bothers your neck, try putting your hands behind your neck (not your head), with your elbows spread apart. ? Lie on your back with your knees bent and your feet flat on the floor. Tighten your belly muscles, and then push with your feet and raise your buttocks up a few inches. Hold this position 6 seconds as you continue to breathe normally, then lower yourself slowly to the floor. Repeat 8 to 12 times. ? If you like group exercise, try Pilates or yoga. These classes have poses that strengthen the core muscles. Lead a healthy lifestyle · Stay at a healthy weight to avoid strain on your back. · Do not smoke. Smoking increases the risk of osteoporosis, which weakens the spine. If you need help quitting, talk to your doctor about stop-smoking programs and medicines. These can increase your chances of quitting for good. Where can you learn more? Go to http://dez-cedric.info/. Enter L315 in the search box to learn more about \"Learning About How to Have a Healthy Back. \" Current as of: November 29, 2017 Content Version: 11.8 © 4611-0704 [employer]. Care instructions adapted under license by [employer] (which disclaims liability or warranty for this information). If you have questions about a medical condition or this instruction, always ask your healthcare professional. [place] [address] any warranty or liability for your use of this information.

## 2018-10-20 LAB
25(OH)D3+25(OH)D2 SERPL-MCNC: 33.9 NG/ML (ref 30–100)
ALBUMIN SERPL-MCNC: 4.3 G/DL (ref 3.5–4.7)
ALBUMIN/GLOB SERPL: 1.5 {RATIO} (ref 1.2–2.2)
ALP SERPL-CCNC: 82 IU/L (ref 39–117)
ALT SERPL-CCNC: 8 IU/L (ref 0–32)
AST SERPL-CCNC: 16 IU/L (ref 0–40)
BASOPHILS # BLD AUTO: 0 X10E3/UL (ref 0–0.2)
BASOPHILS NFR BLD AUTO: 0 %
BILIRUB SERPL-MCNC: 0.6 MG/DL (ref 0–1.2)
BUN SERPL-MCNC: 18 MG/DL (ref 8–27)
BUN/CREAT SERPL: 17 (ref 12–28)
CALCIUM SERPL-MCNC: 10.2 MG/DL (ref 8.7–10.3)
CHLORIDE SERPL-SCNC: 100 MMOL/L (ref 96–106)
CHOLEST SERPL-MCNC: 232 MG/DL (ref 100–199)
CO2 SERPL-SCNC: 21 MMOL/L (ref 20–29)
CREAT SERPL-MCNC: 1.03 MG/DL (ref 0.57–1)
EOSINOPHIL # BLD AUTO: 0.1 X10E3/UL (ref 0–0.4)
EOSINOPHIL NFR BLD AUTO: 2 %
ERYTHROCYTE [DISTWIDTH] IN BLOOD BY AUTOMATED COUNT: 14.5 % (ref 12.3–15.4)
GLOBULIN SER CALC-MCNC: 2.8 G/DL (ref 1.5–4.5)
GLUCOSE SERPL-MCNC: 98 MG/DL (ref 65–99)
HCT VFR BLD AUTO: 40.4 % (ref 34–46.6)
HDLC SERPL-MCNC: 87 MG/DL
HGB BLD-MCNC: 13.6 G/DL (ref 11.1–15.9)
IMM GRANULOCYTES # BLD: 0 X10E3/UL (ref 0–0.1)
IMM GRANULOCYTES NFR BLD: 0 %
INTERPRETATION, 910389: NORMAL
INTERPRETATION: NORMAL
LDLC SERPL CALC-MCNC: 124 MG/DL (ref 0–99)
LYMPHOCYTES # BLD AUTO: 1.1 X10E3/UL (ref 0.7–3.1)
LYMPHOCYTES NFR BLD AUTO: 14 %
MCH RBC QN AUTO: 29.4 PG (ref 26.6–33)
MCHC RBC AUTO-ENTMCNC: 33.7 G/DL (ref 31.5–35.7)
MCV RBC AUTO: 87 FL (ref 79–97)
MONOCYTES # BLD AUTO: 0.6 X10E3/UL (ref 0.1–0.9)
MONOCYTES NFR BLD AUTO: 8 %
NEUTROPHILS # BLD AUTO: 5.9 X10E3/UL (ref 1.4–7)
NEUTROPHILS NFR BLD AUTO: 76 %
PDF IMAGE, 910387: NORMAL
PLATELET # BLD AUTO: 337 X10E3/UL (ref 150–379)
POTASSIUM SERPL-SCNC: 4.7 MMOL/L (ref 3.5–5.2)
PROT SERPL-MCNC: 7.1 G/DL (ref 6–8.5)
RBC # BLD AUTO: 4.63 X10E6/UL (ref 3.77–5.28)
SODIUM SERPL-SCNC: 138 MMOL/L (ref 134–144)
TRIGL SERPL-MCNC: 103 MG/DL (ref 0–149)
TSH SERPL DL<=0.005 MIU/L-ACNC: 1.66 UIU/ML (ref 0.45–4.5)
VLDLC SERPL CALC-MCNC: 21 MG/DL (ref 5–40)
WBC # BLD AUTO: 7.8 X10E3/UL (ref 3.4–10.8)

## 2018-10-20 NOTE — PROGRESS NOTES
Sent in letter: 
All of your labs were good, including thyroid, cholesterol, blood counts, vitamin D, kidney, liver, sugar, and electrolytes. Keep up the good work with healthy lifestyle!

## 2018-10-25 ENCOUNTER — TELEPHONE (OUTPATIENT)
Dept: FAMILY MEDICINE CLINIC | Age: 83
End: 2018-10-25

## 2018-10-25 NOTE — TELEPHONE ENCOUNTER
I can do another cortisone shot in her shoulder when she is ready. I thought the last one was still working pretty well    As far as her hip/back goes, I would recommend the MRI of her back, as a lot of her pain may be coming from her back. It's hard to know without an MRI. If she can get by ok with just doing her home exercises, we could put off the MRI.   It is all about how much it is bothering her

## 2018-10-25 NOTE — TELEPHONE ENCOUNTER
Pt called about the xray results. She wants to know what one you think is the best option. She also wanted to know if she could get another cortisone  Injection in her shoulder. She said that you all had talked about it the last time she was seen. I am supposed to leave a message on VM if she doesn't answer the phone.

## 2018-10-25 NOTE — TELEPHONE ENCOUNTER
Patient is calling requesting call back form [facility] in regards to her lab results she just got in the mail  Last labs : 10/19/18  Best call back : [phone]

## 2018-10-31 ENCOUNTER — OFFICE VISIT (OUTPATIENT)
Dept: FAMILY MEDICINE CLINIC | Age: 83
End: 2018-10-31

## 2018-10-31 VITALS
HEART RATE: 79 BPM | RESPIRATION RATE: 18 BRPM | HEIGHT: 65 IN | WEIGHT: 156.6 LBS | BODY MASS INDEX: 26.09 KG/M2 | OXYGEN SATURATION: 98 % | SYSTOLIC BLOOD PRESSURE: 115 MMHG | TEMPERATURE: 98.1 F | DIASTOLIC BLOOD PRESSURE: 59 MMHG

## 2018-10-31 DIAGNOSIS — M25.512 CHRONIC LEFT SHOULDER PAIN: Primary | ICD-10-CM

## 2018-10-31 DIAGNOSIS — M19.012 PRIMARY OSTEOARTHRITIS OF LEFT SHOULDER: ICD-10-CM

## 2018-10-31 DIAGNOSIS — G89.29 CHRONIC LEFT SHOULDER PAIN: Primary | ICD-10-CM

## 2018-10-31 RX ORDER — LIDOCAINE HYDROCHLORIDE 10 MG/ML
2 INJECTION, SOLUTION EPIDURAL; INFILTRATION; INTRACAUDAL; PERINEURAL ONCE
Qty: 2 ML | Refills: 0
Start: 2018-10-31 | End: 2018-10-31

## 2018-10-31 RX ORDER — TRIAMCINOLONE ACETONIDE 40 MG/ML
40 INJECTION, SUSPENSION INTRA-ARTICULAR; INTRAMUSCULAR ONCE
Qty: 1 ML | Refills: 0
Start: 2018-10-31 | End: 2018-10-31

## 2018-10-31 NOTE — PROGRESS NOTES
[facility] Note      Subjective:     Chief Complaint   Patient presents with    Injection     Patient in office today to get cortisone injection into left shoulder. Maury Wilson is a 80y.o. year old female who presents for evaluation of the following:      Left Shoulder OA:   Last injection 7/19/18  Pain improved with last injection for 2 months  Asks if she should resume PT exercises  No adverse reaction to the injection in the past  Denies fever, redness    XR LT Shoulder:  IMPRESSION:    No acute fracture or dislocation. Moderate degenerative changes at the glenohumeral articulation. Somewhat high riding humeral head relative to the acromion may suggest chronic  rotator cuff tear. Review of Systems   Pertinent positives and negative per HPI. All other systems  reviewed are negative for a Comprehensive ROS (10+). Past Medical History:   Diagnosis Date    Arthritis     Eczema     History of hormone replacement therapy 1987    Hypertension 8041    Lichen planus     Oral    Menopause 1987    Trigeminal neuralgia         Social History     Socioeconomic History    Marital status:      Spouse name: Not on file    Number of children: Not on file    Years of education: Not on file    Highest education level: Not on file   Social Needs    Financial resource strain: Not on file    Food insecurity - worry: Not on file    Food insecurity - inability: Not on file   [employer] needs - medical: Not on file   [employer] needs - non-medical: Not on file   Occupational History    Not on file   Tobacco Use    Smoking status: Former Smoker    Smokeless tobacco: Never Used   Substance and Sexual Activity    Alcohol use: Yes     Alcohol/week: 8.4 oz     Types: 8 Glasses of wine, 6 Shots of liquor per week     Comment: occ.     Drug use: No    Sexual activity: Not Currently   Other Topics Concern    Not on file   Social History Narrative Lives in [facility], independent living    Daughter Александр Black lives nearby       Current Outpatient Medications   Medication Sig    levothyroxine (SYNTHROID) 50 mcg tablet Take 1 Tab by mouth every other day. Alternate with 75    levothyroxine (UNITHROID) 75 mcg tablet Take 1 Tab by mouth every other day. Alternate with 50mcg    gabapentin (NEURONTIN) 100 mg capsule Take 2 Caps by mouth three (3) times daily. Sometimes takes 300mg TID prn    alendronate (FOSAMAX) 70 mg tablet Take 1 Tab by mouth every seven (7) days.  lisinopril (PRINIVIL, ZESTRIL) 2.5 mg tablet Take 1 Tab by mouth daily.  oxybutynin (DITROPAN) 5 mg tablet Take 1 Tab by mouth daily as needed.  doxycycline (ADOXA) 100 mg tablet Take 100 mg by mouth two (2) times a day. AS NEEDED    aspirin delayed-release 81 mg tablet Take  by mouth daily.  CALCIUM CARB/VIT D2/MINERALS (CALTRATE PLUS PO) Take 500 mg by mouth daily.  Omega-3 Fatty Acids (FISH OIL) 500 mg cap Take  by mouth.  OTHER eval for orthosis both feet for bilat foot pain and unsteady gait. Fax to [facility] [phone]. DX bilat foot pain M79.671 and unsteady gait R26.81     No current facility-administered medications for this visit. Objective:     Vitals:    10/31/18 1722   BP: 115/59   Pulse: 79   Resp: 18   Temp: 98.1 °F (36.7 °C)   TempSrc: Oral   SpO2: 98%   Weight: 156 lb 9.6 oz (71 kg)   Height: 5' 5.4\" (1.661 m)       Physical Examination:  General: Alert, cooperative, no distress, appears stated age. Eyes: Conjunctivae clear. Nose: Nares normal.   Mouth/Throat: Lips, mucosa, and tongue normal.   Neck: Supple  Extremities: Extremities normal, atraumatic, no cyanosis or edema. - Left shoulder with crepitus, lateral and anterior joint pain, Minimal should effusion. Pulses: 2+ and symmetric all extremities. Skin: Skin color, texture, turgor normal. No rashes or lesions on exposed skin. - No rash at injection site  Neurologic: CNII-XII intact. PRE-OP DIAGNOSIS: Left Shoulder osteoarthritis  POST-OP DIAGNOSIS: Same   PROCEDURE: joint injection  Performing Physician: Bettie Ojeda MD    Dose:      2mL 1%    Lidocaine   1mL Steroid 40mg/mL Triamcinolone acetonide     Procedure: The area was prepped in the usual sterile manner. The needle was inserted into the affected area and the steroid was injected. There were no complications during this procedure. Followup: The patient tolerated the procedure well without complications. Standard post-procedure care is explained and return precautions are given. Office Visit on 10/19/2018   Component Date Value Ref Range Status    WBC 10/19/2018 7.8  3.4 - 10.8 x10E3/uL Final    RBC 10/19/2018 4.63  3.77 - 5.28 x10E6/uL Final    HGB 10/19/2018 13.6  11.1 - 15.9 g/dL Final    HCT 10/19/2018 40.4  34.0 - 46.6 % Final    MCV 10/19/2018 87  79 - 97 fL Final    MCH 10/19/2018 29.4  26.6 - 33.0 pg Final    MCHC 10/19/2018 33.7  31.5 - 35.7 g/dL Final    RDW 10/19/2018 14.5  12.3 - 15.4 % Final    PLATELET 99/65/7170 327  150 - 379 x10E3/uL Final    NEUTROPHILS 10/19/2018 76  Not Estab. % Final    Lymphocytes 10/19/2018 14  Not Estab. % Final    MONOCYTES 10/19/2018 8  Not Estab. % Final    EOSINOPHILS 10/19/2018 2  Not Estab. % Final    BASOPHILS 10/19/2018 0  Not Estab. % Final    ABS. NEUTROPHILS 10/19/2018 5.9  1.4 - 7.0 x10E3/uL Final    Abs Lymphocytes 10/19/2018 1.1  0.7 - 3.1 x10E3/uL Final    ABS. MONOCYTES 10/19/2018 0.6  0.1 - 0.9 x10E3/uL Final    ABS. EOSINOPHILS 10/19/2018 0.1  0.0 - 0.4 x10E3/uL Final    ABS. BASOPHILS 10/19/2018 0.0  0.0 - 0.2 x10E3/uL Final    IMMATURE GRANULOCYTES 10/19/2018 0  Not Estab. % Final    ABS. IMM. GRANS.  10/19/2018 0.0  0.0 - 0.1 x10E3/uL Final    Glucose 10/19/2018 98  65 - 99 mg/dL Final    BUN 10/19/2018 18  8 - 27 mg/dL Final    Creatinine 10/19/2018 1.03* 0.57 - 1.00 mg/dL Final    GFR est non-AA 10/19/2018 48* >59 mL/min/1.73 Final  GFR est AA 10/19/2018 56* >59 mL/min/1.73 Final    BUN/Creatinine ratio 10/19/2018 17  12 - 28 Final    Sodium 10/19/2018 138  134 - 144 mmol/L Final    Potassium 10/19/2018 4.7  3.5 - 5.2 mmol/L Final    Chloride 10/19/2018 100  96 - 106 mmol/L Final    CO2 10/19/2018 21  20 - 29 mmol/L Final    Calcium 10/19/2018 10.2  8.7 - 10.3 mg/dL Final    Protein, total 10/19/2018 7.1  6.0 - 8.5 g/dL Final    Albumin 10/19/2018 4.3  3.5 - 4.7 g/dL Final    GLOBULIN, TOTAL 10/19/2018 2.8  1.5 - 4.5 g/dL Final    A-G Ratio 10/19/2018 1.5  1.2 - 2.2 Final    Bilirubin, total 10/19/2018 0.6  0.0 - 1.2 mg/dL Final    Alk. phosphatase 10/19/2018 82  39 - 117 IU/L Final    AST (SGOT) 10/19/2018 16  0 - 40 IU/L Final    ALT (SGPT) 10/19/2018 8  0 - 32 IU/L Final    Cholesterol, total 10/19/2018 232* 100 - 199 mg/dL Final    Triglyceride 10/19/2018 103  0 - 149 mg/dL Final    HDL Cholesterol 10/19/2018 87  >39 mg/dL Final    VLDL, calculated 10/19/2018 21  5 - 40 mg/dL Final    LDL, calculated 10/19/2018 124* 0 - 99 mg/dL Final    VITAMIN D, 25-HYDROXY 10/19/2018 33.9  30.0 - 100.0 ng/mL Final    Comment: Vitamin D deficiency has been defined by the [address] practice guideline as a  level of serum 25-OH vitamin D less than 20 ng/mL (1,2). The Endocrine Society went on to further define vitamin D  insufficiency as a level between 21 and 29 ng/mL (2). 1. IOM ([place] of Medicine). 2010. Dietary reference     intakes for calcium and D. [facility]: The     [employer]. 2. Rhonda MF, Rmaan NC, Viktor HERNANDEZ, et al.     Evaluation, treatment, and prevention of vitamin D     deficiency: an Endocrine Society clinical practice     guideline. JCEM. 2011 Jul; 96(7):1911-30.  TSH 10/19/2018 1.660  0.450 - 4.500 uIU/mL Final    INTERPRETATION 10/19/2018 Note   Final    Supplemental report is available.     PDF IMAGE 83/07/5140 Not applicable   Final  Interpretation 10/19/2018 Note   Final    Supplemental report is available. Assessment/ Plan:   Diagnoses and all orders for this visit:    1. Chronic left shoulder pain  -     triamcinolone acetonide (KENALOG) 40 mg/mL injection; 1 mL by Intra artICUlar route once for 1 dose. -     lidocaine, PF, (XYLOCAINE) 10 mg/mL (1 %) injection; 2 mL by Intra artICUlar route once for 1 dose. 2. Primary osteoarthritis of left shoulder  -     triamcinolone acetonide (KENALOG) 40 mg/mL injection; 1 mL by Intra artICUlar route once for 1 dose. -     lidocaine, PF, (XYLOCAINE) 10 mg/mL (1 %) injection; 2 mL by Intra artICUlar route once for 1 dose. Left shoulder steroid injection given 3 months after previous. Follow up with PCP in 3 months. Educated patient on red flag symptoms to warrant return to clinic or emergency room visit. I have discussed the diagnosis with the patient and the intended plan as seen in the above orders. The patient has been offered or received an after-visit summary and questions were answered concerning future plans. I have discussed medication side effects and warnings with the patient as well. Follow-up Disposition:  Return if symptoms worsen or fail to improve, for Follow Up.       Signed,    Rosmery Chawla MD  10/31/2018

## 2018-10-31 NOTE — PATIENT INSTRUCTIONS
Joint Injections: Care Instructions  Your Care Instructions    Joint injections are shots into a joint, such as the knee. They may be used to put in medicines, such as pain relievers. A corticosteroid, or steroid, shot is used to reduce inflammation in tendons or joints. It is often used to treat problems such as arthritis, tendinitis, and bursitis. Steroids can be injected directly into a painful, inflamed joint. They can also help reduce inflammation of a bursa. A bursa is a sac of fluid. It cushions and lubricates areas where tendons, ligaments, skin, muscles, or bones rub against each other. A steroid shot can sometimes help with short-term pain relief when other treatments haven't worked. If steroid shots help, pain may improve for weeks or months. Follow-up care is a key part of your treatment and safety. Be sure to make and go to all appointments, and call your doctor if you are having problems. It's also a good idea to know your test results and keep a list of the medicines you take. How can you care for yourself at home? · Put ice or a cold pack on the area for 10 to 20 minutes at a time. Put a thin cloth between the ice and your skin. · Ask your doctor if you can take an over-the-counter pain medicine, such as acetaminophen (Tylenol), ibuprofen (Advil, Motrin), or naproxen (Aleve). Be safe with medicines. Read and follow all instructions on the label. · Avoid strenuous activities for several days. In particular, avoid ones that put stress on the area where you got the shot. · If you have dressings over the area, keep them clean and dry. You may remove them when your doctor tells you to. When should you call for help? Call your doctor now or seek immediate medical care if:    · You have signs of infection, such as:  ? Increased pain, swelling, warmth, or redness. ? Red streaks leading from the site. ? Pus draining from the site.   ? A fever.    Watch closely for changes in your health, and be sure to contact your doctor if you have any problems. Where can you learn more? Go to http://dez-cedric.info/. Enter N616 in the search box to learn more about \"Joint Injections: Care Instructions. \"  Current as of: November 29, 2017  Content Version: 11.8  © 1982-2376 [employer]. Care instructions adapted under license by [employer] (which disclaims liability or warranty for this information). If you have questions about a medical condition or this instruction, always ask your healthcare professional. [address] any warranty or liability for your use of this information.

## 2018-10-31 NOTE — PROGRESS NOTES
Chief Complaint   Patient presents with    Injection     Patient in office today to get cortisone injection into left shoulder. 1. Have you been to the ER, urgent care clinic since your last visit? Hospitalized since your last visit? No    2. Have you seen or consulted any other health care providers outside of the [facility] since your last visit? Include any pap smears or colon screening.  No

## 2018-11-15 RX ORDER — LEVOTHYROXINE SODIUM 50 UG/1
50 TABLET ORAL EVERY OTHER DAY
Qty: 45 TAB | Refills: 0 | Status: SHIPPED | OUTPATIENT
Start: 2018-11-15 | End: 2019-02-06 | Stop reason: SDUPTHER

## 2018-11-15 RX ORDER — LEVOTHYROXINE SODIUM 75 UG/1
75 TABLET ORAL EVERY OTHER DAY
Qty: 45 TAB | Refills: 0 | Status: SHIPPED | OUTPATIENT
Start: 2018-11-15 | End: 2019-02-06 | Stop reason: SDUPTHER

## 2018-11-15 NOTE — TELEPHONE ENCOUNTER
Patient is calling requesting a refill on these medications    Levothyroxine  . 075 mcg  Levothyroxine . 05 mcg    Pharmacy verified    LOV:  Wednesday, October 31, 2018

## 2018-11-30 RX ORDER — ALENDRONATE SODIUM 70 MG/1
70 TABLET ORAL
Qty: 4 TAB | Refills: 0 | Status: SHIPPED | OUTPATIENT
Start: 2018-11-30 | End: 2019-09-23 | Stop reason: SDUPTHER

## 2018-11-30 NOTE — TELEPHONE ENCOUNTER
Pt is calling in regards to having medication alendronate (FOSAMAX) 70 mg tablet she is only requesting to have 4 pills called into her local pharmacy being that she has already called in full RX to her mail order.      Best call back number [phone]    Pharmacy on file verified  ([facility])

## 2018-12-21 ENCOUNTER — TELEPHONE (OUTPATIENT)
Dept: FAMILY MEDICINE CLINIC | Age: 83
End: 2018-12-21

## 2018-12-21 NOTE — TELEPHONE ENCOUNTER
Pt wanted to know if she could take the fosamax and the thyroid med together. Advised no that she needs to take them separately. Pt states understanding.

## 2018-12-21 NOTE — TELEPHONE ENCOUNTER
----- Message from Kala Byrd sent at 12/21/2018 11:20 AM EST -----  Regarding: Dr. Zuleika San MD/ telephone  Pt would like a call back in reference to medication.  Pts contact [phone]

## 2018-12-27 ENCOUNTER — TELEPHONE (OUTPATIENT)
Dept: FAMILY MEDICINE CLINIC | Age: 83
End: 2018-12-27

## 2018-12-27 NOTE — TELEPHONE ENCOUNTER
Fabian Borges from [address] [facility] is requesting Office notes with medical history, need for Home health , from July 19, 2018.   Be fax to them   Fax : [phone] gilbert Sher call back :  [phone]

## 2018-12-27 NOTE — TELEPHONE ENCOUNTER
----- Message from Ronan Sheffield sent at 12/27/2018  3:25 PM EST -----  Regarding: Pat/christen Rodriguez with [facility] [employer] is returning a call from today in regard to a message she left earlier today. Kal Shadow number is cell [phone] or office [phone] ask for Makayla Rough.

## 2018-12-28 NOTE — TELEPHONE ENCOUNTER
LVM on cell and work #. Spoke to Hernan Danielle and advised that I had faxed 7/19 OV note. She will check with Tyrese España to make sure she got it. She will call me if any problem.

## 2019-01-07 ENCOUNTER — TELEPHONE (OUTPATIENT)
Dept: FAMILY MEDICINE CLINIC | Age: 84
End: 2019-01-07

## 2019-01-07 NOTE — TELEPHONE ENCOUNTER
Called pt and she wanted to know if she should just do MRI of the hip or should she come in first.  Advised that she is due a AWV so I have scheduled her for 1/18 @ 12:45. We will take care of both issues at that appt.

## 2019-01-07 NOTE — TELEPHONE ENCOUNTER
----- Message from Stanford Horne sent at 1/7/2019 11:22 AM EST -----  Regarding: Pat/christen  Pt is requesting for you to call her in regard to having an MRI scheduled for her right hip. Pts number is [phone].

## 2019-01-18 ENCOUNTER — OFFICE VISIT (OUTPATIENT)
Dept: FAMILY MEDICINE CLINIC | Age: 84
End: 2019-01-18

## 2019-01-18 VITALS
WEIGHT: 156 LBS | BODY MASS INDEX: 25.99 KG/M2 | DIASTOLIC BLOOD PRESSURE: 65 MMHG | TEMPERATURE: 97.7 F | RESPIRATION RATE: 18 BRPM | HEIGHT: 65 IN | SYSTOLIC BLOOD PRESSURE: 125 MMHG | HEART RATE: 57 BPM | OXYGEN SATURATION: 97 %

## 2019-01-18 DIAGNOSIS — M54.2 NECK PAIN ON RIGHT SIDE: ICD-10-CM

## 2019-01-18 DIAGNOSIS — Z00.00 MEDICARE ANNUAL WELLNESS VISIT, SUBSEQUENT: Primary | ICD-10-CM

## 2019-01-18 DIAGNOSIS — G89.29 CHRONIC RIGHT SHOULDER PAIN: ICD-10-CM

## 2019-01-18 DIAGNOSIS — G89.29 CHRONIC RIGHT-SIDED LOW BACK PAIN WITHOUT SCIATICA: ICD-10-CM

## 2019-01-18 DIAGNOSIS — N18.30 CHRONIC RENAL DISEASE, STAGE 3, MODERATELY DECREASED GLOMERULAR FILTRATION RATE BETWEEN 30-59 ML/MIN/1.73 SQUARE METER (HCC): ICD-10-CM

## 2019-01-18 DIAGNOSIS — M54.50 CHRONIC RIGHT-SIDED LOW BACK PAIN WITHOUT SCIATICA: ICD-10-CM

## 2019-01-18 DIAGNOSIS — M25.511 CHRONIC RIGHT SHOULDER PAIN: ICD-10-CM

## 2019-01-18 DIAGNOSIS — M25.551 RIGHT HIP PAIN: ICD-10-CM

## 2019-01-18 DIAGNOSIS — N39.46 MIXED INCONTINENCE URGE AND STRESS: ICD-10-CM

## 2019-01-18 NOTE — PROGRESS NOTES
[facility] Neeta. Nikole, [address] 
[phone] Date of visit: 1/18/19 This is an Subsequent Medicare Annual Wellness Visit (AWV), (Performed more than 12 months after effective date of Medicare Part B enrollment and 12 months after last preventive visit) I have reviewed the patient's medical history in detail and updated the computerized patient record. Truong Zuniga is a 80 y.o. female History obtained from: patient Concerns today  
-right hip pain (posterior) a huge problem really limiting her activity, some days worse than others 
-using cane in left hand does help, started that when she got her right knee replaced 
-her balance is off as well 
 
-right shoulder pain bad, francis when she twists arm a little to put on makeup Has no urge to go until she stands up and then leaks Has not been successful with doing kegels regularly Does have oxybutinin, she uses it when going somewhere Wonders if she can take thyroid with her fosamax, told her ok to try it. Wonders if ok to have grapefruit, told her that should be fine Has a really painful \"rope\" feeling in right posterior neck that comes and goes if she turns a certain way Got new orthotics, theya re \"ok\" they do work better than the old ones Does occasionally check bp and is good History Patient Active Problem List  
Diagnosis Code  Oral lichen planus U84.0  Balance problem R26.89  
 Right hip pain M25.551  Chronic foot pain, right M79.671, G89.29  
 Neuropathic postherpetic trigeminal neuralgia B02.22  
 Acquired hypothyroidism E03.9  Essential hypertension I10  
 OAB (overactive bladder) N32.81  
 Excessive drinking alcohol F10.10  Postmenopausal HRT (hormone replacement therapy) Z53.151  Atopic dermatitis L20.9  Chronic left shoulder pain M25.512, G89.29  
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  
Diagnosis Date  Arthritis  Eczema  History of hormone replacement therapy 1987  Hypertension 2009  Lichen planus Oral  
 Menopause 1987  Trigeminal neuralgia Past Surgical History:  
Procedure Laterality Date  HX BREAST BIOPSY Left 2014 Stereo  HX COLONOSCOPY  2012  HX KNEE REPLACEMENT Right 2014  HX ROTATOR CUFF REPAIR Right Allergies Allergen Reactions  Voltaren [Diclofenac Sodium] Anaphylaxis  [place] Other (comments)  Lyrica [Pregabalin] Drowsiness  Neosporin [Benzalkonium Chloride] Rash Current Outpatient Medications Medication Sig Dispense Refill  alendronate (FOSAMAX) 70 mg tablet Take 1 Tab by mouth every seven (7) days. 4 Tab 0  
 levothyroxine (SYNTHROID) 50 mcg tablet Take 1 Tab by mouth every other day. Alternate with 75 45 Tab 0  
 levothyroxine (UNITHROID) 75 mcg tablet Take 1 Tab by mouth every other day. Alternate with 50mcg 45 Tab 0  
 gabapentin (NEURONTIN) 100 mg capsule Take 2 Caps by mouth three (3) times daily. Sometimes takes 300mg TID prn 720 Cap 1  
 lisinopril (PRINIVIL, ZESTRIL) 2.5 mg tablet Take 1 Tab by mouth daily. 90 Tab 1  
 oxybutynin (DITROPAN) 5 mg tablet Take 1 Tab by mouth daily as needed. 90 Tab 1  
 doxycycline (ADOXA) 100 mg tablet Take 100 mg by mouth two (2) times a day. AS NEEDED  aspirin delayed-release 81 mg tablet Take  by mouth daily.  CALCIUM CARB/VIT D2/MINERALS (CALTRATE PLUS PO) Take 500 mg by mouth daily.  Omega-3 Fatty Acids (FISH OIL) 500 mg cap Take  by mouth. Family History Problem Relation Age of Onset  Other Mother   
     severe hot flashes  Heart Failure Mother 76 Julian Barrios Mother 76  Other Daughter   
     severe hot flashes  Heart Attack Father 46  Breast Cancer Maternal Aunt Social History Tobacco Use  Smoking status: Former Smoker  Smokeless tobacco: Never Used Substance Use Topics  Alcohol use: Yes Alcohol/week: 8.4 oz Types: 8 Glasses of wine, 6 Shots of liquor per week Comment: occ. Specialists/Care Team  
Emely Blue has established care with the following healthcare providers: 
Patient Care Team: 
Aristides Mann MD as PCP - General (Family Practice) Alda Gonzalez MD (Ophthalmology) Dentist and eye doctor, physical therapist 
 
Health Risk Assessment Demographics  
female 
80 y.o. General Health Questions  
-During the past 4 weeks: 
 -how would you rate your health in general? Good 
 -how often have you been bothered by feeling dizzy when standing up? Not at all and balance is getting better -how much have you been bothered by bodily pain? As above, few issues 
 -Have you noticed any hearing difficulties? [address] [address] with her hearing aids, they do help some but an adjustment 
 -has your physical and emotional health limited your social activities with family or friends? no 
 
Emotional Health Questions  
-Do you have a history of depression, anxiety, or emotional problems? no 
-Over the past 2 weeks, have you felt down, depressed or hopeless? no 
-Over the past 2 weeks, have you felt little interest or pleasure in doing things? no 
PHQ over the last two weeks 1/18/2019 Little interest or pleasure in doing things Not at all Feeling down, depressed, irritable, or hopeless Not at all Total Score PHQ 2 0 Health Habits Please describe your diet habits: eats cheese, egg normal, plenty of veggies, eats well Do you get 5 servings of fruits or vegetables daily? yes Do you exercise regularly? Rides bike 3x per week for 30 minutes, does 30 min of PT exercises Activities of Daily Living and Functional Status -Do you need help with eating, walking, dressing, bathing, toileting, the phone, transportation, shopping, preparing meals, housework, laundry, medications or managing money? Daughter helps her with very little 
-In the past four weeks, was someone available to help you if you needed and wanted help with anything? yes 
-Are you confident are you that you can control and manage most of your health problems? yes 
-Have you been given information to help you keep track of your medications? yes 
-How often do you have trouble taking your medications as prescribed? never Fall Risk and Home Safety Have you fallen 2 or more times in the past year? no 
Does your home have rugs in the hallway, lack grab bars in the bathroom, lack handrails on the stairs or have poor lighting? no 
Do you have smoke detectors and check them regularly? yes Do you have difficulties driving a car? No, just goes short distances Do you always fasten your seat belt when you are in a car? yes Review of Systems (if indicated for problems addressed today) Card: denies chest pain Pulm: denies shortness of breath Physical Examination Vitals:  
 01/18/19 1243 BP: 125/65 Pulse: (!) 57 Resp: 18 Temp: 97.7 °F (36.5 °C) TempSrc: Oral  
SpO2: 97% Weight: 156 lb (70.8 kg) Height: 5' 5.4\" (1.661 m) Body mass index is 25.64 kg/m². No exam data present Was the patient's timed Up & Go test unsteady or longer than 30 seconds? no 
 
Evaluation of Cognitive Function Mood/affect:  happy Orientation: normal 
Appearance: age appropriate Family member/caregiver input: none Additional exam if indicated for problems addressed today: 
General: stated age, well developed, well nourished and in NAD Neck: supple, symmetrical, trachea midline, no adenopathy and thyroid: not enlarged, symmetric, no tenderness/mass/nodules Lungs:  clear to auscultation w/o rales, rhonchi, wheezes w/normal effort and no use of accessory muscles of respiration Heart: regular rate and rhythm, S1, S2 normal, no murmur, click, rub or gallop Abdomen: soft, nontender, no masses Ext:  No edema noted. MSK: tender over right buttock/SI region, not tender over lumbar spinous processes or hip greater trochanter. No pain with int/ext rotation right hip. Lymph: no cervical adenopathy appreciated Skin:  Normal. and no rash or abnormalities Psych: alert and oriented to person, place, time and situation and Speech: appropriate quality, quantity and organization of sentences Advice/Referrals/Counseling (as indicated) Education and counseling provided for any problems identified above: diet, exercise (keep up good work) Preventive Services Health Maintenance Topic Date Due  Shingrix Vaccine Age 50> (1 of 2) 05/02/1979  GLAUCOMA SCREENING Q2Y  05/02/1994  MEDICARE YEARLY EXAM  01/17/2019  Pneumococcal 65+ Low/Medium Risk (2 of 2 - PPSV23) 12/05/2019  
 DTaP/Tdap/Td series (2 - Td) 07/19/2028  Bone Densitometry (Dexa) Screening  Completed  Influenza Age 5 to Adult  Completed (Preventive care checklist to be included in patient instructions) Discussed today Done Previously Not Needed X both  Pneumococcal vaccines  
 x  Flu vaccine  
  x Hepatitis B vaccine (if at risk) X Shingrix X Zostavax  Shingles vaccine  
 x  TDAP vaccine  
 x  Mammogram  
  x Pap smear  
  x Colorectal cancer screening  
  x Low-dose CT for lung cancer screening  
x   Bone density test  
 X recently  Glaucoma screening  
 x  Cholesterol test  
 x  Diabetes screening test   
  x Diabetes self-management class  
  x Nutritionist referral for diabetes or renal disease Discussion of Advance Directive Discussed with Cortez Mcadams her ability to prepare and advance directive in the case that an injury or illness causes her to be unable to make health care decisions. Has one, will bring it by 
 
Assessment/Plan Z00.00 ICD-10-CM ICD-9-CM 1. Medicare annual wellness visit, subsequent Z00.00 V70.0 2. Right hip pain M25.551 719.45   
3. Chronic right-sided low back pain without sciatica M54.5 724.2 MRI LUMB SPINE WO CONT  
 G89.29 338.29   
4. Mixed incontinence urge and stress N39.46 788.33 REFERRAL TO PHYSICAL THERAPY 5. Neck pain on right side M54.2 723.1 6. Chronic right shoulder pain M25.511 719.41   
 G89.29 338.29   
7. Chronic renal disease, stage 3, moderately decreased glomerular filtration rate between 30-59 mL/min/1.73 square meter (HCC) N18.3 585.3 Orders Placed This Encounter  MRI LUMB SPINE WO CONT  REFERRAL TO PHYSICAL THERAPY  DISCONTD: varicella-zoster recombinant, PF, (SHINGRIX, PF,) 50 mcg/0.5 mL susr injection Overall doing well The posterior hip pain bothers her the most 
Has known arthritis in hip and back, but seems this pain is actually coming from her back She has done PT without help Will do MRI to see if there is a place we could inject Due age, allergies, comorbidities would not recommend nsaids The neck and shoulder are likely chronic arthritis/rotator cuff tendonitis Injection helped shoulder but too early to do another Not wanting more PT Encouraged home stretches at least 
 
Refer pelvic PT for the mixed incontinence She does use her ditropan when going out, and it helps some Patient Instructions To schedule the MRI, please call: 
[phone] [facility] scheduling) For your neck and right shoulder--home exercises. I can refer you for physical therapy any time you want I could re-inject it in Feb if you would like For the back--MRI to see if there is a good place to inject Continue your home exercises For the incontinence--try to get pelvic physical therapy, I will print the order for you to try to get where you live OK to use AZO short-term, but that will not cure an infection Follow-up Disposition: Return in about 3 months (around 4/18/2019) for Follow up, blood tests (non-fasting).  
 
Ba Guo MD

## 2019-01-18 NOTE — PROGRESS NOTES
Chief Complaint Patient presents with [facility] Annual Wellness Visit  1. Have you been to the ER, urgent care clinic since your last visit? Hospitalized since your last visit? No 
 
2. Have you seen or consulted any other health care providers outside of the [address] [place] [address] since your last visit? Include any pap smears or colon screening.  No

## 2019-01-18 NOTE — PATIENT INSTRUCTIONS
To schedule the MRI, please call: 
[phone] [facility] scheduling) For your neck and right shoulder--home exercises. I can refer you for physical therapy any time you want I could re-inject it in Feb if you would like For the back--MRI to see if there is a good place to inject Continue your home exercises For the incontinence--try to get pelvic physical therapy, I will print the order for you to try to get where you live OK to use AZO short-term, but that will not cure an infection Well Visit, Over 72: Care Instructions Your Care Instructions Physical exams can help you stay healthy. Your doctor has checked your overall health and may have suggested ways to take good care of yourself. He or she also may have recommended tests. At home, you can help prevent illness with healthy eating, regular exercise, and other steps. Follow-up care is a key part of your treatment and safety. Be sure to make and go to all appointments, and call your doctor if you are having problems. It's also a good idea to know your test results and keep a list of the medicines you take. How can you care for yourself at home? · Reach and stay at a healthy weight. This will lower your risk for many problems, such as obesity, diabetes, heart disease, and high blood pressure. · Get at least 30 minutes of exercise on most days of the week. Walking is a good choice. You also may want to do other activities, such as running, swimming, cycling, or playing tennis or team sports. · Do not smoke. Smoking can make health problems worse. If you need help quitting, talk to your doctor about stop-smoking programs and medicines. These can increase your chances of quitting for good. · Protect your skin from too much sun. When you're outdoors from 10 a.m. to 4 p.m., stay in the shade or cover up with clothing and a hat with a wide brim. Wear sunglasses that block UV rays.  Even when it's cloudy, put broad-spectrum sunscreen (SPF 30 or higher) on any exposed skin. · See a dentist one or two times a year for checkups and to have your teeth cleaned. · Wear a seat belt in the car. · Limit alcohol to 2 drinks a day for men and 1 drink a day for women. Too much alcohol can cause health problems. Follow your doctor's advice about when to have certain tests. These tests can spot problems early. For men and women · Cholesterol. Your doctor will tell you how often to have this done based on your overall health and other things that can increase your risk for heart attack and stroke. · Blood pressure. Have your blood pressure checked during a routine doctor visit. Your doctor will tell you how often to check your blood pressure based on your age, your blood pressure results, and other factors. · Diabetes. Ask your doctor whether you should have tests for diabetes. · Vision. Experts recommend that you have yearly exams for glaucoma and other age-related eye problems. · Hearing. Tell your doctor if you notice any change in your hearing. You can have tests to find out how well you hear. · Colon cancer tests. Keep having colon cancer tests as your doctor recommends. You can have one of several types of tests. · Heart attack and stroke risk. At least every 4 to 6 years, you should have your risk for heart attack and stroke assessed. Your doctor uses factors such as your age, blood pressure, cholesterol, and whether you smoke or have diabetes to show what your risk for a heart attack or stroke is over the next 10 years. · Osteoporosis. Talk to your doctor about whether you should have a bone density test to find out whether you have thinning bones. Also ask your doctor about whether you should take calcium and vitamin D supplements. For women · Pap test and pelvic exam. You may no longer need a Pap test. Talk with your doctor about whether to stop or continue to have Pap tests. · Breast exam and mammogram. Ask how often you should have a mammogram, which is an X-ray of your breasts. A mammogram can spot breast cancer before it can be felt and when it is easiest to treat. · Thyroid disease. Talk to your doctor about whether to have your thyroid checked as part of a regular physical exam. Women have an increased chance of a thyroid problem. For men · Prostate exam. Talk to your doctor about whether you should have a blood test (called a PSA test) for prostate cancer. Experts disagree on whether men should have this test. Some experts recommend that you discuss the benefits and risks of the test with your doctor. · Abdominal aortic aneurysm. Ask your doctor whether you should have a test to check for an aneurysm. You may need a test if you ever smoked or if your parent, brother, sister, or child has had an aneurysm. When should you call for help? Watch closely for changes in your health, and be sure to contact your doctor if you have any problems or symptoms that concern you. Where can you learn more? Go to http://dez-cedric.info/. Enter I772 in the search box to learn more about \"Well Visit, Over 65: Care Instructions. \" Current as of: March 28, 2018 Content Version: 11.9 © 2952-6636 [employer]. Care instructions adapted under license by [employer] (which disclaims liability or warranty for this information). If you have questions about a medical condition or this instruction, always ask your healthcare professional. [place] [address] any warranty or liability for your use of this information.

## 2019-01-19 NOTE — ACP (ADVANCE CARE PLANNING)
Discussed with Joe Gilliland her ability to prepare and advance directive in the case that an injury or illness causes her to be unable to make health care decisions.    Has one, will bring it by

## 2019-01-28 ENCOUNTER — TELEPHONE (OUTPATIENT)
Dept: FAMILY MEDICINE CLINIC | Age: 84
End: 2019-01-28

## 2019-01-28 NOTE — TELEPHONE ENCOUNTER
Chris Woods from [address] is calling on behalf of the patient, Chris Woods is  requesting the patients most recent office visit notes, history and physical to be faxed over.        Fax: [phone]      LOV:  Friday, January 18, 2019

## 2019-02-04 ENCOUNTER — HOSPITAL ENCOUNTER (OUTPATIENT)
Dept: MRI IMAGING | Age: 84
Discharge: HOME | End: 2019-02-04
Attending: FAMILY MEDICINE
Payer: MEDICARE

## 2019-02-04 DIAGNOSIS — M54.50 CHRONIC RIGHT-SIDED LOW BACK PAIN WITHOUT SCIATICA: ICD-10-CM

## 2019-02-04 DIAGNOSIS — G89.29 CHRONIC RIGHT-SIDED LOW BACK PAIN WITHOUT SCIATICA: ICD-10-CM

## 2019-02-04 PROCEDURE — 72148 MRI LUMBAR SPINE W/O DYE: CPT

## 2019-02-06 ENCOUNTER — TELEPHONE (OUTPATIENT)
Dept: FAMILY MEDICINE CLINIC | Age: 84
End: 2019-02-06

## 2019-02-06 DIAGNOSIS — M54.41 CHRONIC RIGHT-SIDED LOW BACK PAIN WITH RIGHT-SIDED SCIATICA: Primary | ICD-10-CM

## 2019-02-06 DIAGNOSIS — M48.061 FORAMINAL STENOSIS OF LUMBAR REGION: ICD-10-CM

## 2019-02-06 DIAGNOSIS — M48.062 SPINAL STENOSIS OF LUMBAR REGION WITH NEUROGENIC CLAUDICATION: ICD-10-CM

## 2019-02-06 DIAGNOSIS — G89.29 CHRONIC RIGHT-SIDED LOW BACK PAIN WITH RIGHT-SIDED SCIATICA: Primary | ICD-10-CM

## 2019-02-06 NOTE — TELEPHONE ENCOUNTER
Left message on her private voicemail about MRI. Told her I had sent note on [employer] as well she could check. Recommended injection to help her pain and told her I would order, did not have to do if she didn't want to but probably worth trying at least once given her level of pain. Told her if she has questions to write me back on eMazeMe or call the office.

## 2019-02-06 NOTE — TELEPHONE ENCOUNTER
Pt. called in today requesting a 90-days supply refill on the following meds. Pharm on file verified     LOV 01/18/19    Requested Prescriptions     Pending Prescriptions Disp Refills    lisinopril (PRINIVIL, ZESTRIL) 2.5 mg tablet 90 Tab 1     Sig: Take 1 Tab by mouth daily.  levothyroxine (UNITHROID) 75 mcg tablet 45 Tab 0     Sig: Take 1 Tab by mouth every other day. Alternate with 50mcg    levothyroxine (SYNTHROID) 50 mcg tablet 45 Tab 0     Sig: Take 1 Tab by mouth every other day.  Alternate with 75

## 2019-02-08 RX ORDER — LEVOTHYROXINE SODIUM 50 UG/1
50 TABLET ORAL EVERY OTHER DAY
Qty: 45 TAB | Refills: 0 | Status: SHIPPED | OUTPATIENT
Start: 2019-02-08 | End: 2019-04-24 | Stop reason: SDUPTHER

## 2019-02-08 RX ORDER — LISINOPRIL 2.5 MG/1
2.5 TABLET ORAL DAILY
Qty: 90 TAB | Refills: 1 | Status: SHIPPED | OUTPATIENT
Start: 2019-02-08 | End: 2019-05-28 | Stop reason: SDUPTHER

## 2019-02-08 RX ORDER — LEVOTHYROXINE SODIUM 75 UG/1
75 TABLET ORAL EVERY OTHER DAY
Qty: 45 TAB | Refills: 0 | Status: SHIPPED | OUTPATIENT
Start: 2019-02-08 | End: 2019-04-24 | Stop reason: DRUGHIGH

## 2019-02-08 NOTE — TELEPHONE ENCOUNTER
Last OV was 1/18/19 and labs done 10/19/18  Last filled synthroid on 11/15/18 # 45 with 0 refills and lisinopril on 7/19/18 # 90 with one refill

## 2019-02-14 ENCOUNTER — TELEPHONE (OUTPATIENT)
Dept: FAMILY MEDICINE CLINIC | Age: 84
End: 2019-02-14

## 2019-02-14 NOTE — TELEPHONE ENCOUNTER
----- Message from Astrid Granados sent at 2/14/2019 10:45 AM EST -----  Regarding: Dr. Castro Dopp: [phone]  Pt calling in regards to an appt that she and her daughter, Birgit Watkins, were advised to call for pt to have injection in her hip. Pt and daughter have called the number several times and left messages. Pt stated no one has called them back.  Please call pt with other options if possible for her hip injection or to assist.    The numbers pt was given to speak to jose darnlel for scheduling at [phone] and [phone]      Abbi Balbuena contact:  Home: [phone]  Mobile: [phone]

## 2019-02-15 NOTE — TELEPHONE ENCOUNTER
Called patients daughter Thersa Siemens, patient  verified she wanted to get the appointment for IR injection, she called [facility] several times and finally they called her back her apt is scheduled for 19,   Called [facility] to confirm  Daughter appreciate for checking on her  [place]

## 2019-02-20 ENCOUNTER — HOSPITAL ENCOUNTER (OUTPATIENT)
Dept: INTERVENTIONAL RADIOLOGY/VASCULAR | Age: 84
Discharge: HOME | End: 2019-02-20
Attending: FAMILY MEDICINE | Admitting: FAMILY MEDICINE
Payer: MEDICARE

## 2019-02-20 VITALS — BODY MASS INDEX: 26.76 KG/M2 | HEIGHT: 64 IN | WEIGHT: 156.75 LBS

## 2019-02-20 DIAGNOSIS — M54.41 CHRONIC RIGHT-SIDED LOW BACK PAIN WITH RIGHT-SIDED SCIATICA: ICD-10-CM

## 2019-02-20 DIAGNOSIS — G89.29 CHRONIC RIGHT-SIDED LOW BACK PAIN WITH RIGHT-SIDED SCIATICA: ICD-10-CM

## 2019-02-20 DIAGNOSIS — M48.062 SPINAL STENOSIS OF LUMBAR REGION WITH NEUROGENIC CLAUDICATION: ICD-10-CM

## 2019-02-20 DIAGNOSIS — M48.061 FORAMINAL STENOSIS OF LUMBAR REGION: ICD-10-CM

## 2019-02-20 PROCEDURE — 64483 NJX AA&/STRD TFRM EPI L/S 1: CPT

## 2019-02-20 PROCEDURE — 74011250636 HC RX REV CODE- 250/636: Performed by: RADIOLOGY

## 2019-02-20 PROCEDURE — 74011636320 HC RX REV CODE- 636/320: Performed by: RADIOLOGY

## 2019-02-20 PROCEDURE — 64484 NJX AA&/STRD TFRM EPI L/S EA: CPT

## 2019-02-20 RX ORDER — LIDOCAINE HYDROCHLORIDE 10 MG/ML
1-10 INJECTION, SOLUTION EPIDURAL; INFILTRATION; INTRACAUDAL; PERINEURAL
Status: DISCONTINUED | OUTPATIENT
Start: 2019-02-20 | End: 2019-02-20 | Stop reason: HOSPADM

## 2019-02-20 RX ORDER — DEXAMETHASONE SODIUM PHOSPHATE 10 MG/ML
10 INJECTION INTRAMUSCULAR; INTRAVENOUS ONCE
Status: COMPLETED | OUTPATIENT
Start: 2019-02-20 | End: 2019-02-20

## 2019-02-20 RX ADMIN — LIDOCAINE HYDROCHLORIDE 6 ML: 10 INJECTION, SOLUTION EPIDURAL; INFILTRATION; INTRACAUDAL; PERINEURAL at 10:54

## 2019-02-20 RX ADMIN — IOPAMIDOL 3 ML: 408 INJECTION, SOLUTION INTRATHECAL at 10:54

## 2019-02-20 RX ADMIN — DEXAMETHASONE SODIUM PHOSPHATE 10 MG: 10 INJECTION, SOLUTION INTRAMUSCULAR; INTRAVENOUS at 10:54

## 2019-02-20 NOTE — Clinical Note
Allergies reviewed, lab results reviewed, pre-procedure education provided, patient verbalized understanding of procedure and procedural consent signed.

## 2019-02-20 NOTE — PROGRESS NOTES
9:40 AM    Pre HARSHAD - ID scanned, consent signed, allergies verified. Denies use of anticoagulants in last 7 days       10:15 AM    Dr. Jacey Robin at bedside      [facility] REPORT:    Verbal report given to Arjun (name) on Sharath Guerrero  being transferred to [facility] (unit) for ordered procedure       Report consisted of patients Situation, Background, Assessment and   Recommendations(SBAR). Information from the following report(s) SBAR was reviewed with the receiving nurse. Lines:       Opportunity for questions and clarification was provided. Patient transported with:   Registered Nurse       [facility] REPORT:    Verbal report received from Dorice Claude, RN (name) on Sharath Guerrero  being received from [facility] (unit) for routine progression of care      Report consisted of patients Situation, Background, Assessment and   Recommendations(SBAR). Information from the following report(s) Procedure Summary was reviewed with the receiving nurse. Opportunity for questions and clarification was provided. Assessment completed upon patients arrival to unit and care assumed. 1120    Discharge instructions reviewed with patient and family. Voiced understanding. Patient given copy of discharge instructions to take home. Patient at baseline - able to bear weight and ambulate  Denies numbness, heaviness in lower extremities. 11:28 AM    Pt discharged via wheelchair with Jb, PCT. Patient discharged into care of her daughter. Personal belongings with patient upon discharge.

## 2019-02-20 NOTE — DISCHARGE INSTRUCTIONS
Patient Education        Learning About Lumbar Epidural Steroid Injections  What is a lumbar epidural steroid injection? A doctor may give you a lumbar epidural steroid injection to try to decrease pain, tingling, or numbness in your back, buttock, or leg. These might be the result of a back or disc problem. The injection goes directly into your epidural space. This is the area in your back around your spinal cord. This injection may have both a local anesthetic and a steroid medicine. Or it may only have a steroid. Local anesthetic medicines numb your nerves right away for a short time. Steroids reduce swelling and pain. But they take a few days to start working. Some people get a series of these injections over weeks or months. How is a lumbar epidural done? The doctor may use an imaging test before or during your injection. This can be an MRI, a CT scan, or an X-ray. These tests can show where your nerve problems are. After finding the right spot, the doctor may inject a numbing medicine into the skin where you will get the steroid injection. Then he or she puts the needle for the steroid into the numbed area. You may feel some pressure. You could feel some stinging or burning during the injection. It takes about 10 to 15 minutes to get this injection. You will probably go home about 20 to 30 minutes after you get it. You will need someone to drive you home. What can you expect after a lumbar epidural?  If your injection had local anesthetic and a steroid, your legs may feel heavy or numb right after. You will probably be able to walk. But you may need to be extra careful. Take care not to lose your balance and be sure to follow your doctor's instructions. If your injection contained local anesthetic, you may feel better right away. But this pain relief will last only a few hours. Your pain will probably return. This is because the steroids have not started working yet.  Before the steroids start to work, your back may be sore for a few days. These injections don't always work. When they do, it takes 1 to 5 days. This pain relief can last for several days to a few months or longer. You may want to do less than normal for a few days. But you may also be able to return to your daily routine. Some people are dizzy or feel sick to their stomach after getting this injection. These symptoms usually do not last very long. If your pain is better, you may be able to keep doing your normal activities or physical therapy. But try not to overdo it, even if your back pain has improved a lot. If your pain is only a little better or if it comes back, your doctor may recommend another injection in a few weeks. If your pain has not changed, talk to your doctor about other treatment choices. Side effects from an epidural steroid injection include headache, fever, or infection. Serious side effects are rare. But they can include stroke, paralysis, or loss of vision. Follow-up care is a key part of your treatment and safety. Be sure to make and go to all appointments, and call your doctor if you are having problems. It's also a good idea to know your test results and keep a list of the medicines you take. Where can you learn more? Go to http://dez-cedric.info/. Enter James Chacon in the search box to learn more about \"Learning About Lumbar Epidural Steroid Injections. \"  Current as of: September 20, 2018  Content Version: 11.9  © 1282-1847 [employer]. Care instructions adapted under license by [employer] (which disclaims liability or warranty for this information). If you have questions about a medical condition or this instruction, always ask your healthcare professional. [place] [address] any warranty or liability for your use of this information.

## 2019-02-20 NOTE — PROGRESS NOTES
TRANSFER - IN REPORT:    Verbal report received from Nohemi Bond, [facility] (name) on Hailey Corrigan  being received from [facility] (unit) for ordered procedure      Report consisted of patients Situation, Background, Assessment and   Recommendations(SBAR). Information from the following report(s) SBAR was reviewed with the receiving nurse. Opportunity for questions and clarification was provided. Assessment completed upon patients arrival to unit and care assumed.

## 2019-02-20 NOTE — PROGRESS NOTES
TRANSFER - OUT REPORT:    Verbal report given to Medina Macias RN (name) on Compa Francisco being transferred to [facility] (unit) for routine progression of care       Report consisted of patient's Situation, Background, Assessment and   Recommendations(SBAR). Information from the following report(s) SBAR was reviewed with the receiving nurse. Opportunity for questions and clarification was provided.       Patient transported with:   Registered Nurse

## 2019-03-20 ENCOUNTER — PATIENT MESSAGE (OUTPATIENT)
Dept: FAMILY MEDICINE CLINIC | Age: 84
End: 2019-03-20

## 2019-03-20 NOTE — TELEPHONE ENCOUNTER
From: Valdo Costa  To: Chris Wagner MD  Sent: 3/20/2019 11:13 AM EDT  Subject: Non-Urgent Medical Question    Finally received [facility]les [facility]. They will call when I can receive the second one. Secondly, just read the new research on eggs, just as I was enjoying them guilt free. I usually eat a poached one breakfast daily? ? Fruit and one piece of toast. Thanks for your input.  Valdo Costa

## 2019-04-08 ENCOUNTER — HOSPITAL ENCOUNTER (OUTPATIENT)
Dept: MAMMOGRAPHY | Age: 84
Discharge: HOME | End: 2019-04-08
Attending: FAMILY MEDICINE
Payer: MEDICARE

## 2019-04-08 DIAGNOSIS — Z12.39 BREAST SCREENING, UNSPECIFIED: ICD-10-CM

## 2019-04-08 PROCEDURE — 77063 BREAST TOMOSYNTHESIS BI: CPT

## 2019-04-23 ENCOUNTER — HOSPITAL ENCOUNTER (OUTPATIENT)
Dept: LAB | Age: 84
Discharge: HOME | End: 2019-04-23
Payer: MEDICARE

## 2019-04-23 ENCOUNTER — OFFICE VISIT (OUTPATIENT)
Dept: FAMILY MEDICINE CLINIC | Age: 84
End: 2019-04-23

## 2019-04-23 VITALS
BODY MASS INDEX: 26.53 KG/M2 | WEIGHT: 155.4 LBS | DIASTOLIC BLOOD PRESSURE: 72 MMHG | TEMPERATURE: 97.5 F | RESPIRATION RATE: 18 BRPM | OXYGEN SATURATION: 98 % | HEART RATE: 80 BPM | HEIGHT: 64 IN | SYSTOLIC BLOOD PRESSURE: 145 MMHG

## 2019-04-23 DIAGNOSIS — F43.9 SITUATIONAL STRESS: ICD-10-CM

## 2019-04-23 DIAGNOSIS — E03.9 ACQUIRED HYPOTHYROIDISM: Primary | ICD-10-CM

## 2019-04-23 DIAGNOSIS — G89.29 CHRONIC RIGHT-SIDED LOW BACK PAIN WITH RIGHT-SIDED SCIATICA: ICD-10-CM

## 2019-04-23 DIAGNOSIS — I10 ESSENTIAL HYPERTENSION: ICD-10-CM

## 2019-04-23 DIAGNOSIS — M25.512 CHRONIC LEFT SHOULDER PAIN: ICD-10-CM

## 2019-04-23 DIAGNOSIS — M54.41 CHRONIC RIGHT-SIDED LOW BACK PAIN WITH RIGHT-SIDED SCIATICA: ICD-10-CM

## 2019-04-23 DIAGNOSIS — N18.30 CHRONIC RENAL DISEASE, STAGE 3, MODERATELY DECREASED GLOMERULAR FILTRATION RATE BETWEEN 30-59 ML/MIN/1.73 SQUARE METER (HCC): ICD-10-CM

## 2019-04-23 DIAGNOSIS — M48.062 SPINAL STENOSIS OF LUMBAR REGION WITH NEUROGENIC CLAUDICATION: ICD-10-CM

## 2019-04-23 DIAGNOSIS — G89.29 CHRONIC LEFT SHOULDER PAIN: ICD-10-CM

## 2019-04-23 PROCEDURE — 84443 ASSAY THYROID STIM HORMONE: CPT

## 2019-04-23 PROCEDURE — 80048 BASIC METABOLIC PNL TOTAL CA: CPT

## 2019-04-23 RX ORDER — GABAPENTIN 300 MG/1
600 CAPSULE ORAL 2 TIMES DAILY
COMMUNITY
Start: 2019-04-23 | End: 2019-05-08 | Stop reason: SDUPTHER

## 2019-04-23 RX ORDER — GABAPENTIN 100 MG/1
100 CAPSULE ORAL
Qty: 90 CAP | Refills: 1 | Status: SHIPPED | OUTPATIENT
Start: 2019-04-23 | End: 2019-04-25 | Stop reason: SDUPTHER

## 2019-04-23 RX ORDER — TRIAMCINOLONE ACETONIDE 40 MG/ML
40 INJECTION, SUSPENSION INTRA-ARTICULAR; INTRAMUSCULAR ONCE
Qty: 1 ML | Refills: 0
Start: 2019-04-23 | End: 2019-04-23

## 2019-04-23 RX ORDER — LIDOCAINE HYDROCHLORIDE 10 MG/ML
2 INJECTION, SOLUTION EPIDURAL; INFILTRATION; INTRACAUDAL; PERINEURAL ONCE
Qty: 2 ML | Refills: 0
Start: 2019-04-23 | End: 2019-04-23

## 2019-04-23 NOTE — PROGRESS NOTES
[address] [place]ole, [address] [place] [address] Road 
[phone] Date of visit: 4/23/19 Subjective:  
  
History obtained from:  the patient. Lizy Galloway is a 80 y.o. female who presents today for follow up chronic problems Right hip and into her calf hurts The lumbar HARSHAD 2 mo ago really helped Taking 2 tylenol as needed Left arm pain severe from rotator cuff Injection helped in past, wants another. This would be 3rd one Right shoulder continues to weaken, has been a while since she did PT but not that long and will let me know when wanting to do more Over a month since shingles shot Still itching left arm They are supposed to call her when they have it in stock. Always low appetite in spring but it comes back Weight down only 1 pound No constipation or diarrhea Goes to eye doc recently but not sure when last time was Will ask them to send record Has cut way back on alcohol Tries to eat well Has been worried with granddaughter sailing across [facility] She made it across Good friend (daughter's mother in law) with dementia just moved into a home Would like some counseling for some family issues that concern her with her daughter and grandsons. Wanted a list of counselors to look into Patient Active Problem List  
 Diagnosis Date Noted  Chronic right-sided low back pain with right-sided sciatica 02/06/2019  Spinal stenosis of lumbar region with neurogenic claudication 02/06/2019  Foraminal stenosis of lumbar region 02/06/2019  Chronic renal disease, stage 3, moderately decreased glomerular filtration rate between 30-59 mL/min/1.73 square meter ([facility] [address] 07/19/2018  Osteopenia of multiple sites 02/02/2018  Atopic dermatitis 01/16/2018  Chronic left shoulder pain 01/16/2018  Female pattern hair loss 01/16/2018  OAB (overactive bladder) 11/29/2017  Excessive drinking alcohol 11/29/2017  Postmenopausal HRT (hormone replacement therapy) 94/61/6773  
 Oral lichen planus 94/20/9253  Balance problem 11/28/2017  Right hip pain 11/28/2017  Chronic foot pain, right 11/28/2017  Neuropathic postherpetic trigeminal neuralgia 11/28/2017  Acquired hypothyroidism 11/28/2017  Essential hypertension 11/28/2017 Current Outpatient Medications Medication Sig Dispense Refill  gabapentin (NEURONTIN) 300 mg capsule Take 2 Caps by mouth two (2) times a day.  gabapentin (NEURONTIN) 100 mg capsule Take 1 Cap by mouth three (3) times daily as needed (worse nerve pain). 90 Cap 1  
 lisinopril (PRINIVIL, ZESTRIL) 2.5 mg tablet Take 1 Tab by mouth daily. 90 Tab 1  
 alendronate (FOSAMAX) 70 mg tablet Take 1 Tab by mouth every seven (7) days. 4 Tab 0  
 oxybutynin (DITROPAN) 5 mg tablet Take 1 Tab by mouth daily as needed. 90 Tab 1  
 doxycycline (ADOXA) 100 mg tablet Take 100 mg by mouth two (2) times a day. AS NEEDED  aspirin delayed-release 81 mg tablet Take  by mouth daily.  CALCIUM CARB/VIT D2/MINERALS (CALTRATE PLUS PO) Take 500 mg by mouth daily.  Omega-3 Fatty Acids (FISH OIL) 500 mg cap Take  by mouth.  levothyroxine (SYNTHROID) 50 mcg tablet Take 1 Tab by mouth daily. (stop 75mcg dose) 90 Tab 1 Allergies Allergen Reactions  Voltaren [Diclofenac Sodium] Anaphylaxis  [place] Other (comments)  Lyrica [Pregabalin] Drowsiness  Neosporin [Benzalkonium Chloride] Rash Past Medical History:  
Diagnosis Date  Arthritis  Eczema  History of hormone replacement therapy 1987  
 stopped  Hypertension 2009  Lichen planus Oral  
 Menopause 1987  Trigeminal neuralgia Past Surgical History:  
Procedure Laterality Date  HX BREAST BIOPSY Left 2014 Stereo  HX COLONOSCOPY  2012  HX CYST INCISION AND DRAINAGE Right 04/16/2018  
 benign  HX KNEE REPLACEMENT Right 2014  HX ROTATOR CUFF REPAIR Right  IR INJ FORAMIN EPID LUMB ANES/STER SNGL  2/20/2019 Family History Problem Relation Age of Onset  Other Mother   
     severe hot flashes  Heart Failure Mother 76 [place] Peto Mother 76  Other Daughter   
     severe hot flashes  Heart Attack Father 46  Breast Cancer Maternal Aunt Social History Tobacco Use  Smoking status: Former Smoker  Smokeless tobacco: Never Used Substance Use Topics  Alcohol use: Yes Alcohol/week: 8.4 oz Types: 8 Glasses of wine, 6 Shots of liquor per week Comment: occ. Social History Social History Narrative Lives in [facility], independent living, [facility] Daughter Ramírez Russo lives nearby Review of Systems Card: denies chest pain or palpitations or dizziness Pulm: denies shortness of breath Objective:  
 
Vitals:  
 04/23/19 1327 BP: 145/72 Pulse: 80 Resp: 18 Temp: 97.5 °F (36.4 °C) TempSrc: Oral  
SpO2: 98% Weight: 155 lb 6.4 oz (70.5 kg) Height: 5' 4\" (1.626 m) Body mass index is 26.67 kg/m². General: stated age, well developed, well nourished and in NAD Neck: supple, symmetrical, trachea midline, no adenopathy and thyroid: not enlarged, symmetric, no tenderness/mass/nodules Lungs:  clear to auscultation w/o rales, rhonchi, wheezes w/normal effort and no use of accessory muscles of respiration Heart: regular rate and rhythm, S1, S2 normal, no murmur, click, rub or gallop MSK: left shoulder mildly tender and normal ROM but pain with all movements. Ext:  No edema noted. Lymph: no cervical adenopathy appreciated Skin:  Normal. and no rash or abnormalities Psych: alert and oriented to person, place, time and situation and Speech: appropriate quality, quantity and organization of sentences Assessment/Plan: ICD-10-CM ICD-9-CM 1. Acquired hypothyroidism E03.9 244.9 TSH 3RD GENERATION 2.  Essential hypertension J55 762.0 METABOLIC PANEL, BASIC  
 3. Chronic right-sided low back pain with right-sided sciatica M54.41 724.2 IR INJ FORAMIN EPID LUMB ANES/STER SNGL  
 G89.29 724.3   
  338.29   
4. Chronic renal disease, stage 3, moderately decreased glomerular filtration rate between 30-59 mL/min/1.73 square meter (HCC) N18.3 585.3 5. 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.062 724.03 IR INJ FORAMIN EPID LUMB ANES/STER SNGL 6. Chronic left shoulder pain M25.512 719.41 TRIAMCINOLONE ACETONIDE INJ  
 G89.29 338.29 triamcinolone acetonide (KENALOG) 40 mg/mL injection  
   lidocaine, PF, (XYLOCAINE) 10 mg/mL (1 %) injection NM DRAIN/INJECT LARGE JOINT/BURSA 7. Situational stress F43.9 V62.89 Orders Placed This Encounter  IR INJ FORAMIN EPID LUMB ANES/STER SNGL  
 METABOLIC PANEL, BASIC  TSH 3RD GENERATION  
 CKD REPORT  
 TRIAMCINOLONE ACETONIDE INJ  20610 - DRAIN/INJECT LARGE JOINT/BURSA  gabapentin (NEURONTIN) 300 mg capsule  gabapentin (NEURONTIN) 100 mg capsule  triamcinolone acetonide (KENALOG) 40 mg/mL injection  lidocaine, PF, (XYLOCAINE) 10 mg/mL (1 %) injection Chronic problems stable Referring for another HARSHAD l spine as the first one really helped Ok to use the 600mg neurontin BID and add occasional 100mg prn as she wanted to do She is certain it has really helped her pain and doesn't think it gives her any side effects Glad she has cut way back on etoh intake She would like to simplify her thyroid dosage if possible instead of alternating dosages [facility] Chart reviewed for the following: 
 Carlton Altman MD, have reviewed the History, Physical and updated the Allergic reactions for [facility] TIME OUT performed immediately prior to start of procedure: 
 Carlton Altman MD, have performed the following reviews on [facility] prior to the start of the procedure: * Patient was identified by name and date of birth * Agreement on procedure being performed was verified * Risks and Benefits explained to the patient * Procedure site verified and marked as necessary * Patient was positioned for comfort * Consent was signed and verified Time: 2:09pm 
 
 
Date of procedure: 4/24/2019 Procedure performed by:  Natalya Martinez MD 
 
Provider assisted by:  none Patient assisted by: self How tolerated by patient: tolerated the procedure well with no complications Post Procedural Pain Scale: 0 - No Hurt Comments: After informed consent, the left shoulder joint was each prepped with chloraprep, numbed with cooling spray, and was each injected into the subacromial space from a posterior approach using no-touch sterile technique with 2 mL 1% lidocaine/ 40 mg triamcinolone. Patient tolerated procedure well and there were no complications. Discussed the diagnosis and plan and she expressed understanding. Follow-up and Dispositions · Return in about 6 months (around 10/23/2019) for Follow up.  
  
 
 
Natalya Martinez MD

## 2019-04-23 NOTE — PATIENT INSTRUCTIONS
You can take up to 3000mg of tylenol per day (6 of your 500mg pills) May 19th is the first day you could get the second shingles vaccine, [facility]. Learning About How to Have a Healthy Back What causes back pain? Back pain is often caused by overuse, strain, or injury. For example, people often hurt their backs playing sports or working in the yard, being jolted in a car accident, or lifting something too heavy. Aging plays a part too. Your bones and muscles tend to lose strength as you age, which makes injury more likely. The spongy discs between the bones of the spine (vertebrae) may suffer from wear and tear and no longer provide enough cushion between the bones. A disc that bulges or breaks open (herniated disc) can press on nerves, causing back pain. In some people, back pain is the result of arthritis, broken vertebrae caused by bone loss (osteoporosis), illness, or a spine problem. Although most people have back pain at one time or another, there are steps you can take to make it less likely. How can you have a healthy back? Reduce stress on your back through good posture Slumping or slouching alone may not cause low back pain. But after the back has been strained or injured, bad posture can make pain worse. · Sleep in a position that maintains your back's normal curves and on a mattress that feels comfortable. Sleep on your side with a pillow between your knees, or sleep on your back with a pillow under your knees. These positions can reduce strain on your back. · Stand and sit up straight. \"Good posture\" generally means your ears, shoulders, and hips are in a straight line. · If you must stand for a long time, put one foot on a stool, ledge, or box. Switch feet every now and then. · Sit in a chair that is low enough to let you place both feet flat on the floor with both knees nearly level with your hips.  If your chair or desk is too high, use a footrest to raise your knees. Place a small pillow, a rolled-up towel, or a lumbar roll in the curve of your back if you need extra support. · Try a kneeling chair, which helps tilt your hips forward. This takes pressure off your lower back. · Try sitting on an exercise ball. It can rock from side to side, which helps keep your back loose. · When driving, keep your knees nearly level with your hips. Sit straight, and drive with both hands on the steering wheel. Your arms should be in a slightly bent position. Reduce stress on your back through careful lifting · Squat down, bending at the hips and knees only. If you need to, put one knee to the floor and extend your other knee in front of you, bent at a right angle (half kneeling). · Press your chest straight forward. This helps keep your upper back straight while keeping a slight arch in your low back. · Hold the load as close to your body as possible, at the level of your belly button (navel). · Use your feet to change direction, taking small steps. · Lead with your hips as you change direction. Keep your shoulders in line with your hips as you move. · Set down your load carefully, squatting with your knees and hips only. Exercise and stretch your back · Do some exercise on most days of the week, if your doctor says it is okay. You can walk, run, swim, or cycle. · Stretch your back muscles. Here are a few exercises to try: 
? Lie on your back, and gently pull one bent knee to your chest. Put that foot back on the floor, and then pull the other knee to your chest. 
? Do pelvic tilts. Lie on your back with your knees bent. Tighten your stomach muscles. Pull your belly button (navel) in and up toward your ribs. You should feel like your back is pressing to the floor and your hips and pelvis are slightly lifting off the floor. Hold for 6 seconds while breathing smoothly. ? Sit with your back flat against a wall. · Keep your core muscles strong. The muscles of your back, belly (abdomen), and buttocks support your spine. ? Pull in your belly and imagine pulling your navel toward your spine. Hold this for 6 seconds, then relax. Remember to keep breathing normally as you tense your muscles. ? Do curl-ups. Always do them with your knees bent. Keep your low back on the floor, and curl your shoulders toward your knees using a smooth, slow motion. Keep your arms folded across your chest. If this bothers your neck, try putting your hands behind your neck (not your head), with your elbows spread apart. ? Lie on your back with your knees bent and your feet flat on the floor. Tighten your belly muscles, and then push with your feet and raise your buttocks up a few inches. Hold this position 6 seconds as you continue to breathe normally, then lower yourself slowly to the floor. Repeat 8 to 12 times. ? If you like group exercise, try Pilates or yoga. These classes have poses that strengthen the core muscles. Lead a healthy lifestyle · Stay at a healthy weight to avoid strain on your back. · Do not smoke. Smoking increases the risk of osteoporosis, which weakens the spine. If you need help quitting, talk to your doctor about stop-smoking programs and medicines. These can increase your chances of quitting for good. Where can you learn more? Go to http://dez-cedric.info/. Enter L315 in the search box to learn more about \"Learning About How to Have a Healthy Back. \" Current as of: September 20, 2018 Content Version: 11.9 © 5017-3084 [employer]. Care instructions adapted under license by [employer] (which disclaims liability or warranty for this information). If you have questions about a medical condition or this instruction, always ask your healthcare professional. [address] any warranty or liability for your use of this information. [facility], [facility] [address], [address] [facility] [address]  
[address] [facility] 33 Phone [phone] Fax [phone] [employer]. [employer] Dr. Zoltan Parham, [facility] Uatsdin counselor, substance abuse counselor [address] [facility] [address] [facility] [address] Phone: [phone] [facility] Uatsdin counseling 
[address]; [address] [place], [address] [facility] 
[phone] Www.[facility] Arizona[facility]K. [facility] [address] [place], [facility] 
[phone] [facility], [facility] [place] Sees all ages, problems [address], [address] [place], 1678 [facility] Phone: [phone] Fax: [phone] Mickey Pickard Specializes in chronic pain patients Tad Duong [address] [facility] [address] [place], [address] [place] [address] Phone:  [phone] Jacob Flicker at [employer] [facility] [address] [place], 40 [facility] Phone: [phone] [facility] and [facility] (adolescents, adults, families for all problems) 1000 [facility],[address]Th Floor, 29 [facility] [place], [facility]ppy [facility] 
uMentioned Phone: [phone] [address] [facility] Phone: [phone] 
[address] [place], [address] Call Poonam Randall for EMDR [address] Phone: [phone] FAX: [phone] 
[address] [facility] [address] [facility] Phone: [phone] FAX: [phone] 201 [facility], [address] [facility] 7 [address] [employer] [phone] FAX: [phone] [address] [address] [phone] FAX: [address] [facility], [address] 
[phone] [facility] [place] - Letitia Romberg, Ph.D 
1111 [facility] [address] [place], 103 [facility]  
[phone] [facility] [address] 1099 [facility], [address] 
[phone] [facility] [place] Office [address] [place] [address], [address] [place] [address] 
[phone] Bhanu Mary, [address] [place] [address], [place], [address] [phone] [facility] [place] ([place] near [facility]) [phone] [address] Location 1230 [facility] 33 [address], [address] 
[phone]

## 2019-04-23 NOTE — LETTER
4/23/2019 2:17 PM 
 
Ms. Norma Parker [address] [facility] [place] 7 [id number] [facility], [facility] [address], [address] [facility], [address] [facility]  
[address], Ramirez Campos 33 Phone [phone] Fax [phone] [facility]. Vibe[employer] Dr. Rox Lowe, [facility] Sabianism counselor, substance abuse counselor [address] [facility], [address] Phone: [phone] [facility] counseling 
[address] [place]; [address] [facility], 15[address] [facility] 
[phone] Www[facility] Center[facility]35 [facility] [address]. [facility], [address] 
[phone] SHAWNEE Polanco [facility] [place] Sees all ages, problems [address] [facility] [address] Kaylee Jernigan 75, [address] [facility], [address] [place] [address] Phone: [phone] Fax: [phone] Yesenia Zamorano Specializes in chronic pain patients Atif Anderson 4[address] [facility] [address] ALINE Engyeeclaribel Mirtaanupswetha 57 Phone:  [phone] Franci Rashid at [facility] '' [facility] [address] [facility], [address] Phone: [phone] [facility] and [facility] (adolescents, adults, families for all problems) 1000 [facility],5Th Floor, 29 [facility]v Phone: [phone] [facility] Supa Arroyo - 600 [facility] Phone: [phone] 
[address] [facility], [address] Call Kate James for EMDR [address] Phone: [phone] FAX: [phone] 247 [address] 135 [address] Phone: [phone] FAX: [phone] 201 [facility], [address] [facility] 7 [address] [facility] [phone] FAX: [phone] [address] [place] [address] [phone] FAX: [phone] 4040 [facility] [address] [place], [address] Edyta Kim, 1256 [facility] [address] 33 
[phone] [facility] [place] - Garrett Carter, Ph.D 
1111 [facility] [address] [place], 103 [facility] [address]  
[phone] [facility] SPECIALTY [facility] [address] 1099 [facility], [address] 
[phone] [facility] Office [address] [place] [address], 15 [facility] 
[phone] [facility], [address] [place] [address], [place], [address] [phone] [facility] [place] Alfonse Simmonds ([place] near [facility]) [phone] [address] Location 1230 [facility] Ramirez Eng 33 [address], [address] 
[phone]

## 2019-04-23 NOTE — PROGRESS NOTES
Chief Complaint Patient presents with  Pain (Chronic) follow up 1. Have you been to the ER, urgent care clinic since your last visit? Hospitalized since your last visit? No 
 
2. Have you seen or consulted any other health care providers outside of the [address] [place] [address] since your last visit? Include any pap smears or colon screening.  No

## 2019-04-24 LAB
BUN SERPL-MCNC: 21 MG/DL (ref 8–27)
BUN/CREAT SERPL: 19 (ref 12–28)
CALCIUM SERPL-MCNC: 9.6 MG/DL (ref 8.7–10.3)
CHLORIDE SERPL-SCNC: 105 MMOL/L (ref 96–106)
CO2 SERPL-SCNC: 21 MMOL/L (ref 20–29)
CREAT SERPL-MCNC: 1.09 MG/DL (ref 0.57–1)
GLUCOSE SERPL-MCNC: 97 MG/DL (ref 65–99)
INTERPRETATION: NORMAL
POTASSIUM SERPL-SCNC: 4.4 MMOL/L (ref 3.5–5.2)
SODIUM SERPL-SCNC: 140 MMOL/L (ref 134–144)
TSH SERPL DL<=0.005 MIU/L-ACNC: 0.66 UIU/ML (ref 0.45–4.5)

## 2019-04-24 RX ORDER — LEVOTHYROXINE SODIUM 50 UG/1
50 TABLET ORAL DAILY
Qty: 90 TAB | Refills: 1 | Status: SHIPPED | OUTPATIENT
Start: 2019-04-24 | End: 2019-04-25 | Stop reason: SDUPTHER

## 2019-04-25 ENCOUNTER — TELEPHONE (OUTPATIENT)
Dept: FAMILY MEDICINE CLINIC | Age: 84
End: 2019-04-25

## 2019-04-25 DIAGNOSIS — G89.29 CHRONIC LEFT SHOULDER PAIN: ICD-10-CM

## 2019-04-25 DIAGNOSIS — M25.512 CHRONIC LEFT SHOULDER PAIN: ICD-10-CM

## 2019-04-25 RX ORDER — LEVOTHYROXINE SODIUM 50 UG/1
50 TABLET ORAL DAILY
Qty: 90 TAB | Refills: 1 | Status: SHIPPED | OUTPATIENT
Start: 2019-04-25 | End: 2019-09-23 | Stop reason: SDUPTHER

## 2019-04-25 RX ORDER — GABAPENTIN 100 MG/1
100 CAPSULE ORAL
Qty: 270 CAP | Refills: 1 | Status: SHIPPED | OUTPATIENT
Start: 2019-04-25 | End: 2019-05-08 | Stop reason: DRUGHIGH

## 2019-04-25 NOTE — TELEPHONE ENCOUNTER
----- Message from Ava Dominguez sent at 4/25/2019 11:13 AM EDT -----  Regarding: Dr. Hannah Dawson  Pt is requesting a call back from the practice regarding to schedule an apt with  but doesn't know when she needs to be seen next. She has received a message from [employer] that she had Rx's ready for p/up should have been sent to [facility] [phone]. Best contact is [phone].

## 2019-04-29 ENCOUNTER — TELEPHONE (OUTPATIENT)
Dept: FAMILY MEDICINE CLINIC | Age: 84
End: 2019-04-29

## 2019-04-29 NOTE — TELEPHONE ENCOUNTER
----- Message from Deidra Richards sent at 4/29/2019 10:49 AM EDT -----  Regarding: Graves/telephone  Pt is returning a call from today in regard to an appointment. Pts number is [phone]. Outbound call to pt.  LVM to call back to schedule apt

## 2019-05-07 ENCOUNTER — PATIENT MESSAGE (OUTPATIENT)
Dept: FAMILY MEDICINE CLINIC | Age: 84
End: 2019-05-07

## 2019-05-08 RX ORDER — GABAPENTIN 300 MG/1
600 CAPSULE ORAL 3 TIMES DAILY
Qty: 540 CAP | Refills: 1 | Status: SHIPPED | OUTPATIENT
Start: 2019-05-08 | End: 2019-05-16 | Stop reason: SDUPTHER

## 2019-05-08 NOTE — TELEPHONE ENCOUNTER
From: Dakotah Arrington  To: Elizabeth Chavez MD  Sent: 5/7/2019 6:10 PM EDT  Subject: Prescription Question    On my last visit with Dr. Estrella Collins, we discussed how I was taking gabapentin. She had me on 6/100 mg three times per day. Since these are pesky little pills to keep off the floor and in the mouth, I asked if I could take 2/300 mg per day. I thought she agreed. I told her I had some 300mg on hand. She sent me a prescription and they are 100mg and the directions are to take one three times a day. 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 I am confused. Please advise.  Thank you, Dakotah Arrington

## 2019-05-16 ENCOUNTER — TELEPHONE (OUTPATIENT)
Dept: FAMILY MEDICINE CLINIC | Age: 84
End: 2019-05-16

## 2019-05-16 RX ORDER — GABAPENTIN 300 MG/1
300 CAPSULE ORAL 2 TIMES DAILY
Qty: 180 CAP | Refills: 1 | COMMUNITY
Start: 2019-05-16 | End: 2019-09-05 | Stop reason: SDUPTHER

## 2019-05-16 NOTE — TELEPHONE ENCOUNTER
Regarding: RE: Non-Urgent Medical Question  Contact: [phone]  ----- Message from [address] [place] [address] 951, [facility] sent at 5/16/2019  3:32 PM EDT -----    I am taking 1 300mg gabapentin twice a day as discussed last visit with Dr. Axel Fitch. This would be 600mg a day, preciously I was taking 6/ 100 mg 3 times a day. It is the same mgs a day just taken differently. If this does not meet with her approval, let me know. Thank you for your help. Jitendra Baird  ----- Message -----  From: Fredy Acosta, SAMANTA  Sent: 7/56/9880  2:53 PM EDT  To: Jitendra Baird  Subject: RE: Non-Urgent Medical Question  So are you are going to take 1-300 mg tablet twice a day? I don't understand 2/300 mg per day. I want to clarify. Thank you,  MJ    ----- Message -----     From: Jitendra Baird     Sent: 5/15/2019  1:07 PM EDT       To: Margarita Pyle MD  Subject: Non-Urgent Medical Question    Still confused. I just received prescription for Gabapentin from [facility]. They are 3mg per capsule and directions are to take two capsules by mouth three times a day. Dr. Axel Fitch and I agreed on last visit that instead of taking 6 100mg per day that I would take two 3mg per day. If I took as directed on bottle, I would sleep 24/7. I plan to take 2/300mg per day unless I hear otherwise.   Thanks for getting this straight for me??? Jitendra Baird

## 2019-05-21 ENCOUNTER — OFFICE VISIT (OUTPATIENT)
Dept: FAMILY MEDICINE CLINIC | Age: 84
End: 2019-05-21

## 2019-05-21 DIAGNOSIS — F43.22 ADJUSTMENT DISORDER WITH ANXIETY: Primary | ICD-10-CM

## 2019-05-28 ENCOUNTER — TELEPHONE (OUTPATIENT)
Dept: FAMILY MEDICINE CLINIC | Age: 84
End: 2019-05-28

## 2019-05-28 RX ORDER — LISINOPRIL 2.5 MG/1
2.5 TABLET ORAL DAILY
Qty: 14 TAB | Refills: 0 | Status: SHIPPED | OUTPATIENT
Start: 2019-05-28 | End: 2019-08-02 | Stop reason: SDUPTHER

## 2019-05-28 NOTE — TELEPHONE ENCOUNTER
Sent over 2 week supply to cover mail order per verbal order from Dr Alonzo Saldaña. Regarding: Prescription Question  Contact: [phone]  ----- Message from [address] [place] [address] [address], Generic sent at 5/28/2019 11:45 AM EDT -----    I have goofed. Somehow I did not know I was out of lisinopril  2.5mg. I took my last tablet last night. I had a refill with  but it will not arrive for at least y 7-10. Could you please call in a RX to [address]   [phone]. .    unless it is safe for me to wait that long. Hernesto Monzon Thank you, Yuriy Siu

## 2019-06-05 ENCOUNTER — OFFICE VISIT (OUTPATIENT)
Dept: FAMILY MEDICINE CLINIC | Age: 84
End: 2019-06-05

## 2019-06-05 DIAGNOSIS — F43.22 ADJUSTMENT DISORDER WITH ANXIETY: Primary | ICD-10-CM

## 2019-06-10 ENCOUNTER — PATIENT MESSAGE (OUTPATIENT)
Dept: FAMILY MEDICINE CLINIC | Age: 84
End: 2019-06-10

## 2019-06-10 ENCOUNTER — TELEPHONE (OUTPATIENT)
Dept: FAMILY MEDICINE CLINIC | Age: 84
End: 2019-06-10

## 2019-06-10 NOTE — TELEPHONE ENCOUNTER
From: Ajit Combs  To: Don Lanza MD  Sent: 6/10/2019 10:25 AM EDT  Subject: Non-Urgent Medical Question    Dr. Vince James, Good morning! I have been reading up on rotator cuff injury. It appears that cold therapy and not using the joint is a good way to go. The shoulder is stabilized with cold packs four times a day to increase blood flow. Of course, money is involved. The material is over $300. This is an opinion question. Some of the physical therapy exercises cause pain and the shot is temporary. I do have an ortho dr., Dr. Dennis Roman. As my prime doctor, just want an opinion. Thank you for your time. Happy rainy Monday!  Ajit Combs

## 2019-06-10 NOTE — TELEPHONE ENCOUNTER
On these direct injections dose the pt go thru central scheduling or is there another #? That is what the pt needs.   Dr Cherylene Glee wanted her to come back for another injection

## 2019-06-10 NOTE — TELEPHONE ENCOUNTER
Patient states she has a Cortisone shot done for her shoulder in February 2019 at [facility] and when she called them they don't know who gave her the shot, she need another shot and is requesting Dr Tanya Owen to call her back.   She is confused to where she has to go  LOV :April 23, 2019  [facility]# [phone]

## 2019-06-11 NOTE — TELEPHONE ENCOUNTER
She and I had been sending messages in my chart about her shoulder, so I wrote her back about this as well. She can call central scheduling, and I gave her the number.

## 2019-06-13 NOTE — PROGRESS NOTES
This mental health therapy note will not be viewable by patient in [address] This mental health therapy note is marked SENSITIVE in [facility]. DATE:   6/5/19  SESSION LENGTH:  50 minutes 26063  PARTICIPANTS:   Lory Jones  SUBJECTIVE: (theme of session: patient observations, thoughts, direct quotes, symptoms reported)  Pt presents alone today for individual session. She brings with her 3 therapeutic journal writings per last visit. She reports, I am starting to recognize the difference in how I feel when I try to rescue her versus when I just listen. .       OBJECTIVE: (MSE, Screening/Asst Measures, Hx info, Meds, Bx Observations)  Medication Changes? ? No  ? Yes    MENTAL STATUS EXAM:  APPEARANCE ? Clean  ? Neat  ? Unkempt  ? Disheveled     LOOKS STATED AGE ? Yes ? No ? Younger ? Older   EYE CONTACT: ? Poor ? Good  ? Staring ? Avoidant ? Varied ? Erratic   ORIENTATION   ? x4    ? Time  ? Place  ? Person  ? Situation        DEMEANOR   ? Apathetic  ? Boastful  ? Cold   ? Cooperative  ? Covert ? Demanding  ? Dramatic  ? Evasive ? Friendly ? Hostile  ? Irritable ? Seductive  ? Self-Depreciating  ? Guarded  ? Forthcoming   THOUGHT PROCESS ? Normal: logical, tight, linear, coherent, goal directed  ? Abnormal:  ? Circumstantial  ? Tangential ? Loose  ? Flight of Ideas ? Perseveration   ? Word Salad   ? Clanging  ? Thought Blocking          THOUGHT CONTENT ? WNL/Appropriate ? Preoccupations ? Delusions    ? Ideas of Reference ? Derealization  ? Depersonalization   ? Hallucinating (visual, auditory, tactile):    ? Paranoid   ? Ruminative  ? Intact     ? Derailed thinking      SPEECH ? Clear    ? Slurring    ? Slowed   ? Loud      ? Soft  ? Mute  ? Pressured  ? Excessive   ? Minimal    ? Incoherent   SENSORY DEFICITS ? No Change since last MSE   ? Speech  ? Hearing  ? Vision   INTERPERSONAL ? Interactive  ? Intermittently Interactive   ? Guarded  ? Withdrawn  ? Hostile   AFFECT ? Appropriate  ?  Full Range ?Inappropriate  ? Blunted  ? Constricted   ? Flat ? Suspicious ? Guarded  ? Euthymic ? Grandiose  ? Labile   ? Stable  ? Congruent ? Incongruent   ATTENTION ? Attentive  ? Inattentive   ? Distractible    COGNITIVE PERFORMANCE ? Alert  ? Focused ? Organized  ? Disorganized     ? Memory Intact ? Memory Deficient: ? Short-Term  ? Long-Term    ? Developmental Disability  ? Slow Processing   MOOD ? Angry  ? Anxious  ? Ashamed  ? Over Stimulated   ? Depressed   ? Euphoric  ? Relaxed ? Sad  ? Elated ? Worried  ? Hopeful     MOTIVATION ? Good    ? Fair    ? Poor   JUDGEMENT ? Good    ? Fair    ? Poor   INSIGHT ? Good    ? Fair    ? Poor     RISK ASSESSMENT   Suicide  Current Ideation: denied             Current Plan: denied         Current Attempt: none    Homicide  Current Ideation: denied              Current Plan: denied          Current Attempt: none    Significant Destruction of Property  Current Ideation: denied              Current Plan: denied          Current Attempt: none    ASSESSMENT: (assessment of S/O, content of session, intervention, patient response to intervention, progress in goals)  Pt is able to tolerate reading her journals outloud and engaging in processing- she is able to develop insight and awareness into how her feelings impact her mood and behavior with her daughter. She still struggles with letting to and listening and has engaged in quite a bit of arguing and difficulty not giving advice. She is able to tolerate processing her role in the family now as the older mother who allows her children to sort out their own issues versus anxiety about feeling like I have to fix it. . She discloses that she is feeling underlying guilt about the ability to financially help her daughter more, but struggles with the decision because of what her other 2 children are telling her. Other children are telling her to [address] her figure it out mom, that money is for you from dad, you might need it one day. .  Pt is able to engage in role play with me re boundaries, how to assertively communicate. She reports that she always thought assertive communication was being mean and actually does very well with simple exercises in role play around setting boundaries. Pt did shed tears when discussing underlying feelings of guilt about helping her daughter and further underlying feelings of what it is like for a mother to see her child suffer the same decisions over and over again. . Pt is able to tolerate [facility] triangle review during role play of how enabling her daughter does not allow her daughter to learn how to be independent. Pt anxiety is not present in session today, she is grounded and centered. She reports that she can [place] when she does not engage. She also reported that has started to ask her daughter, Fabian Salmon it ok if I just listen and not offer advice.  Which has worked sometimes and not sometimes. . Self Care will be a visit with her grandchildren at [address] [facility] and she continues to go to [place] [facility]. Pt able to understand and receive psychoed re using todays writings to start a draft letter to her children. Goal Revision: ? No  ? Yes  ? N/A    Goal Progress Measures: Progressing, Maintaining, Regressing, Inconsistent, Mastered, Completed, Added New Today, Not Addressed    Trauma Informed Goals:  1. Improve ability to have healthy boundaries with children- Progressing  2. Increase ability to communicate assertively- Progressing  3. Improve Self-Care Activities- Progressing   4. Connect Patient with [facility] to Support Evidence Based Tx Interventions- Not Addressed   5. Therapist and patient to collaborate with other providers as appropriate - Ongoing   6. Therapist and patient to FU with PCP for continuity in plan of care via, Miguel, MANOHAR and face to face as clinically indicated- Ongoing    Home-Based Work Reviewed:   ?  No  ? Yes    ? N/A  - Provided pt psychoed re initial ways to express her feelings, thoughts about move and challenges with children in form of a therapeutic journal writing to bring to next session. Pt was very pleased with this recommendation and psychoed and is eager to type these up. Home-Based Work Recommended: ? No  ? Yes ? N/A  - Use themes from todays therapeutic journals to start to craft letter to children and bring draft to next session. TRAUMA INFORMED INTERVENTIONS   Cognitive Behavioral Therapy Techniques (CBT):  Explored/Developed Awareness/Increased Insight:  ? Emotions/Feelings ? Coping Patterns ? Relationships ? Self Esteem   ? Boundaries ? Cultural Influences ? Family Influences  Other Techniques:   ? Cognitive Triangle ? Cognitive Challenging ? Cognitive Refocusing  ? Cognitive Reframing  ? Self-Monitoring  ? Supportive Reflection  ? Interactive Feedback ? Interpersonal Resolutions ? Mindfulness  ? Relaxation/ Breathing ? Role Play/Behavioral Rehearsal  ? Symptom Management               Trauma Focused CBT Modules/Techniques (TFCBT):   ? Gradual Exposure/Desensitization  ? Assessment/Engagement ? PsychoEd     ? Self-Care Plan ? Relaxation/Mindfulness ? Affect Modulation  ? Cognitive Coping  ? Trauma Narrative (Exposure/Cognitive Processing)  ? In Vivo Exposure ? Conjoint Narrative- IF CLINICALLY APPROPRIATE   ? Enhancing Future Safety    Motivational Interviewing (MI):   ? Reflective Listening ? Open-Ended Strategies ? Affirmations             ? Supportive Statements ? Exploring Change ? Responding to Sustain Talk   ? Encourage Insight ? Emphasizing Personal Choice/Self-Empowerment        ? Summarizing ? Eliciting Self-Motiv. Statmts   Exploring: ? Problem Recognition ? Concerns ? Intent to Change  ? Optimism    Behavior Activation (BA):   ? Sched. Behavior Activities ? Home-based Assignments      ? Therapist Modeling   ? Having Back-Up Plans                            ? Specific Problem Solving  ? Skills Training and Education  ? Action/TRAP/TRAC [email or web address]  ? Role Play        Acceptance and Commitment Therapy Techniques (ACT):   ? Present Moment ? Diffusion  ? Acceptance                   ? Self as Context ? Committed Action ? Values        ? Psychological Flexibility    Other Interventions:  ? Rapport Building  ? Assessment  ? Safety Planning  ? Reviewed Safety Plan   ? Play Therapy Techniques ? Art Therapy Techniques ? DBT Technique  ? Structured Problem Solving ? Communication Skills  ? Social Skills  ? Recreation/Leisure Skills ? Self-Care Plan ? Review of All Meds   ? Review of Medical Conditions ? Psychoeducation- Meds   ? Psychoeducation- Other  ? Prevention Services ? Community Based Referral              ? Psychotropic Med Referral: ? PCP ? Psychiatric Provider  ? PCP Referral for y Health Issue ? Psychological Testing Referral       ?Neuropsychological Testing Referral ?Other     PLAN:  Patient agreed to continue current treatment plan goals outlined above. See patient again in  3     weeks. Referrals: ?  No  ? Yes    ? N/A

## 2019-06-13 NOTE — PROGRESS NOTES
This mental health assessment will not be viewable by the patient in [address] This mental health assessment is marked SENSITIVE in [facility]. [facility]  INITIAL CLINICAL ASSESSMENT    PATIENT NAME: Oli LOUIS: 1929 (89)         DATE OF ASSESSMENT: 19       REFERRAL SOURCE: PCP here at [facility], Dr. Essie Rainey     [id number] 63 minutes    Assessment includes review of [facility] patient medical record, clinical interview, therapist observation and collaboration with pt primary care provider/referral source. PRESENTING PROBLEMS/HISTORY OF PRESENT ILLNESS:   Fawad Smiley is a 81 y/o  female who presents with adjustment related symptoms due to moving from [place], [place] where she and her   lived in a family home that had been their home for over 79 years. She moved to [address] [place] [address] to be closer to her children and grandchildren. She lives in a transitional senior community where she currently resides in her own apartment and can transition to a nursing facility on-site if/when needed. There are activities in the main center and pt receives full service care there. Location is in [facility]. She reports since moving closer last year, she has struggled with feeling like the go between or Eulas Blankenship between her 2 daughters. She also feels responsible for caretaking of her youngest daugthers medical and family problems. She reports this results in frequent disagreements and arguments re to advices she gives and is seeking ways to adjust and better communicate with her daugthers. She reports symptoms of anxiety and helplessness re to her daughters needs. LEE reveals score of 2, pt reports she Josef Rebollar has anxiety when she knows her daughter is coming over. .  Pt denies hx of other mental health treatment in the past.     Per OV with Dr. Essie Rainey- note 19: Would like some counseling for some family issues that concern her with her daughter and faby. Wanted a list of counselors to look into. ---- End Dr. Summer Gatica:  APPEARANCE ? Clean  ? Neat  ? Unkempt  ? Disheveled     LOOKS STATED AGE ? Yes ? No ? Younger ? Older   EYE CONTACT: ? Poor ? Good  ? Staring ? Avoidant ? Varied ? Erratic   ORIENTATION  ? Time  ? Place  ? Person  ? Situation   ? x4   DEMEANOR   ? Apathetic  ? Arrogant  ? Boastful  ? Cold   ? Cooperative  ? Covert ? Demanding  ? Dramatic  ? Evasive ? Friendly ? Hostile  ? Irritable ? Seductive  ? Self-Depreciating  ? Guarded  ? Forthcoming   THOUGHT PROCESS ? Normal: logical, tight, linear, coherent, goal directed  ? Abnormal:  ? Circumstantial  ? Tangential ? Loose  ? Flight of Ideas ? Perseveration   ? Word Salad   ? Clanging  ? Thought Blocking          THOUGHT CONTENT ? WNL/Appropriate ? Preoccupations ? Delusions    ? Ideas of Reference ? Derealization  ? Depersonalization   ? Hallucinating (visual, auditory, tactile):    ? Paranoid   ? Ruminative  ? Intact     ? Derailed thinking      SPEECH ? Clear    ? Slurring    ? Slowed   ? Loud      ? Soft  ? Mute  ? Pressured  ? Excessive   ? Minimal    ? Incoherent   SENSORY DEFICITS ? None Reported  ? Speech  ? Hearing  ? Vision   INTERPERSONAL ? Interactive  ? Intermittently Interactive   ? Guarded  ? Withdrawn  ? Hostile   AFFECT ? Appropriate  ? Full Range ? Restricted  ? Blunted  ? Constricted   ? Flat ? Suspicious ? Guarded  ? Euthymic ? Grandiose  ? Labile   ? Stable  ? Congruent ? Incongruent   ATTENTION ? Attentive  ? Inattentive   ? Distractible    COGNITIVE PERFORMANCE ? Alert  ? Focused ? Organized  ? Disorganized     ? Memory Intact ? Memory Deficient: ? Short-Term  ? Long-Term    ? Developmental Disability ? Slow Processing    MOOD ? Angry  ? Anxious  ? Ashamed  ? Calm  ? Depressed   ? Euphoric  ? Relaxed ? Sad  ? Elated ? Worried  ? Hopeful     MOTIVATION ? Good    ? Fair    ? Poor   JUDGEMENT ? Good    ? Fair    ? Poor   INSIGHT ? Good    ? Fair    ?  Poor     RISK ASSESSMENT  Suicide  Current Ideation: See above           Current Plan: denied         Current Attempt: none  Previous Plan: denied       Previous Attempt: denied    Homicide  Current Ideation: denied              Current Plan: denied          Current Attempt: none  Previous Ideation: denied            Previous Plan: denied          Previous Attempt: denied  Previous Completion: denied    Destruction of Property  Current Ideation: denied              Current Plan: denied          Current Attempt: none  Previous Ideation: denied            Previous Plan: denied          Previous Attempt: denied  Previous Completion: denied    MENTAL HEALTH TREATMENT HISTORY  Pt denies history of MH treatment. FAMILY & SOCIAL HISTORY  Pt moved from [address] [place] [address] last year after her s death to be closer to family. She has moved into [facility] in [facility], she really likes her new home. She has 3 children- Balaji Kiser and Reece III- 2 daughters and 1 son. Sarah Gao lives in [place], [place]- she and pt have a very distressing relationship she has twin boys who are 32 and dont get out of bed and one daugther; Katie Waite lives near [place], [place] and reports they have a very good and loving relationship- she has 2 children; son Niko Torres III lives in [facility] and has one son and one daughter. Her late  is described as my best friend, he was a POW in 1917, they moved around a lot, enjoyed this- he passed in 2001 and over the years she has wanted to move closer to [place]. Pt used to be a  for over 31 years before alf and taught science in middle and .      Family History as of last OV with PCP Dr. Malick Mcclain 4/23/19:    Problem Relation Age of Onset    Other Mother           severe hot flashes    Heart Failure Mother 76    Breast Cancer Mother 76    Other Daughter           severe hot flashes    Heart Attack Father 46    Breast Cancer Maternal Aunt          SOCIAL HISTORY        Social History Narrative     Lives in [facility], independent living, [facility]     Daughter rGeg Antony lives nearby     CPS/APS Issues:  Meme Terrell   Pt belongs to the [address] [facility] [address] [employer]. She was an active member in her Hindu her whole life before moving, since moving she is exploring local churches to find a new fit. She reports that local churches want her to change her membership and she does not want to change from her home Hindu. She is actively attending services, however, and enjoys trying new places like [employer] and [address] [place] [address]. LEISURE/RECREATION  Pt is active in a weekly [employer] off site from her apartment, she has several friends there, she really enjoys this. She also enjoys technology and actively uses her tablet, iphone and laptop. DAILY SCHEDULE/SOMATIC FUNCTIONING:  Pt reports she sleeps very well has a set bed and waking time, no issues falling asleep, staying asleep and waking. She eats 3 meals a day and snacks, is healthy eater, drinks water and wants to drink more water.      MEDICAL HISTORY  Patient Active Problem List as of last OV with PCP Dr. Felisa Valencia 4/23/19:      Diagnosis Date Noted    Chronic right-sided low back pain with right-sided sciatica 02/06/2019    Spinal stenosis of lumbar region with neurogenic claudication 02/06/2019    Foraminal stenosis of lumbar region 02/06/2019    Chronic renal disease, stage 3, moderately decreased glomerular filtration rate between 30-59 mL/min/1.73 square meter ([address] 07/19/2018    Osteopenia of multiple sites 02/02/2018    Atopic dermatitis 01/16/2018    Chronic left shoulder pain 01/16/2018    Female pattern hair loss 01/16/2018    OAB (overactive bladder) 11/29/2017    Excessive drinking alcohol 11/29/2017    Postmenopausal HRT (hormone replacement therapy) 76/42/5335    Oral lichen planus 08/00/2694    Balance problem 11/28/2017    Right hip pain 11/28/2017    Chronic foot pain, right 11/28/2017    Neuropathic postherpetic trigeminal neuralgia 11/28/2017    Acquired hypothyroidism 11/28/2017    Essential hypertension 11/28/2017             Current Outpatient Medications List as of last OV with PCP Dr. Jame Sanderson 4/23/19:   Medication Sig    gabapentin (NEURONTIN) 300 mg capsule Take 2 Caps by mouth two (2) times a day.  gabapentin (NEURONTIN) 100 mg capsule Take 1 Cap by mouth three (3) times daily as needed (worse nerve pain).  lisinopril (PRINIVIL, ZESTRIL) 2.5 mg tablet Take 1 Tab by mouth daily.  alendronate (FOSAMAX) 70 mg tablet Take 1 Tab by mouth every seven (7) days.  oxybutynin (DITROPAN) 5 mg tablet Take 1 Tab by mouth daily as needed.  doxycycline (ADOXA) 100 mg tablet Take 100 mg by mouth two (2) times a day. AS NEEDED      aspirin delayed-release 81 mg tablet Take  by mouth daily.  CALCIUM CARB/VIT D2/MINERALS (CALTRATE PLUS PO) Take 500 mg by mouth daily.  Omega-3 Fatty Acids (FISH OIL) 500 mg cap Take  by mouth.  levothyroxine (SYNTHROID) 50 mcg tablet Take 1 Tab by mouth daily.  (stop 75mcg dose)           Allergies   Allergen Reactions    Voltaren [Diclofenac Sodium] Anaphylaxis    [place] Other (comments)    Lyrica [Pregabalin] Drowsiness    Neosporin [Benzalkonium Chloride] Rash           Past Medical History:   Diagnosis Date    Arthritis      Eczema      History of hormone replacement therapy 1987     stopped    Hypertension 5477    Lichen planus       Oral    Menopause 1987    Trigeminal neuralgia              Past Surgical History:   Procedure Laterality Date    HX BREAST BIOPSY Left 2014     Stereo    HX COLONOSCOPY   2012    HX CYST INCISION AND DRAINAGE Right 04/16/2018     benign    HX KNEE REPLACEMENT Right 2014    HX ROTATOR CUFF REPAIR Right      IR INJ Winger Borer EPID LUMB ANES/STER SNGL   2/20/2019      Allergies:   Per review of pt EMR today:    Voltaren [Diclofenac Sodium] Anaphylaxis High Side Effect 11/28/2017    [place] Other (comments) Not Specified  11/28/2017    Lyrica [Pregabalin] Drowsiness Not Specified Intolerance 7/19/2018    Neosporin [Benzalkonium Chloride] Rash Not Specified Topical 11/28/2017      PCP: Dr. Delgado Boswell here at [facility] Last Visit:  4/23/19    Specialists: Carissa Yu MD - Ophthalmology    SUBSTANCE HISTORY   As of last OV with Dr. Delgado Boswell 4/23/19:         Tobacco Use    Smoking status: Former Smoker    Smokeless tobacco: Never Used   Substance Use Topics    Alcohol use: Yes       Alcohol/week: 8.4 oz       Types: 8 Glasses of wine, 6 Shots of liquor per week       Comment: occ. Pt denies current or historical use of illegal substances and denies misuse of legal substances. Pt has very occasional caffeinated drink. DIAGNOSIS/BRIEF DISCUSSION:  Diagnosis: F43.22 Adjustment Disorder with anxiety     Discussion: PHQ 1/1/18 was 0 and today was 0; PT LEE today was 3. Pt reports symptoms of adjustment disorder. Will continue assessment ongoing and update if clinically appropriate. Pt has good support, stable housing, is financially stable; struggling with adjustment and boundaries with children. PT looks and presents much younger than 80, she does walk with a cane and reports she would like to slow down a little when she walks but did not appear to walk to quickly today. TREATMENT PLAN  The progress of these goals will be measured by patient report, PCP report, parent report, collateral report, therapist observation. The total number of sessions expected is as needed and will be individual sessions using trauma informed CBT, MI, BA, Art Therapy Techniques and others empirically supported techniques as clinically appropriate and documented in each session. Frequency is bi weekly initially and then once per month per pt request will update plan if this changes. Trauma Informed Treatment goals include:  1.  Improve ability to have healthy boundaries with children- Added Today  2. Increase ability to communicate assertively- Added Today  3. Improve Self-Care Activities- Added Today   4. Connect Patient with [facility] to Support Evidence Based Tx Interventions- Added Today  5. Therapist and patient to collaborate with other providers as appropriate - Added Today  6. Therapist and patient to FU with PCP for continuity in plan of care via, MyChart, CC and face to face as clinically indicated- Ongoing    The patient verbalizes understanding and agreement with the plan, goals and recommendations. ADDITIONAL RECOMMENDATIONS:  Provided pt psychoed re initial ways to express her feelings, thoughts about move and challenges with children in form of a therapeutic journal writing to bring to next session. Pt was very pleased with this recommendation and psychoed and is eager to type these up.

## 2019-06-21 ENCOUNTER — HOSPITAL ENCOUNTER (OUTPATIENT)
Dept: INTERVENTIONAL RADIOLOGY/VASCULAR | Age: 84
Discharge: HOME | End: 2019-06-21
Attending: FAMILY MEDICINE | Admitting: RADIOLOGY
Payer: MEDICARE

## 2019-06-21 VITALS — BODY MASS INDEX: 26.46 KG/M2 | WEIGHT: 155 LBS | HEIGHT: 64 IN

## 2019-06-21 DIAGNOSIS — M54.41 CHRONIC RIGHT-SIDED LOW BACK PAIN WITH RIGHT-SIDED SCIATICA: ICD-10-CM

## 2019-06-21 DIAGNOSIS — M48.062 SPINAL STENOSIS OF LUMBAR REGION WITH NEUROGENIC CLAUDICATION: ICD-10-CM

## 2019-06-21 DIAGNOSIS — G89.29 CHRONIC RIGHT-SIDED LOW BACK PAIN WITH RIGHT-SIDED SCIATICA: ICD-10-CM

## 2019-06-21 PROCEDURE — 74011250636 HC RX REV CODE- 250/636: Performed by: RADIOLOGY

## 2019-06-21 PROCEDURE — 74011636320 HC RX REV CODE- 636/320: Performed by: RADIOLOGY

## 2019-06-21 PROCEDURE — 64483 NJX AA&/STRD TFRM EPI L/S 1: CPT

## 2019-06-21 RX ORDER — LIDOCAINE HYDROCHLORIDE 10 MG/ML
5 INJECTION, SOLUTION EPIDURAL; INFILTRATION; INTRACAUDAL; PERINEURAL ONCE
Status: COMPLETED | OUTPATIENT
Start: 2019-06-21 | End: 2019-06-21

## 2019-06-21 RX ORDER — DEXAMETHASONE SODIUM PHOSPHATE 10 MG/ML
10 INJECTION INTRAMUSCULAR; INTRAVENOUS ONCE
Status: COMPLETED | OUTPATIENT
Start: 2019-06-21 | End: 2019-06-21

## 2019-06-21 RX ADMIN — IOPAMIDOL 8 ML: 408 INJECTION, SOLUTION INTRATHECAL at 11:42

## 2019-06-21 RX ADMIN — LIDOCAINE HYDROCHLORIDE 10 ML: 10 INJECTION, SOLUTION EPIDURAL; INFILTRATION; INTRACAUDAL; PERINEURAL at 11:41

## 2019-06-21 RX ADMIN — DEXAMETHASONE SODIUM PHOSPHATE 10 MG: 10 INJECTION, SOLUTION INTRAMUSCULAR; INTRAVENOUS at 11:42

## 2019-06-21 NOTE — DISCHARGE INSTRUCTIONS
Patient Education        Learning About Lumbar Epidural Steroid Injections  What is a lumbar epidural steroid injection? A doctor may give you a lumbar epidural steroid injection to try to decrease pain, tingling, or numbness in your back, buttock, or leg. These might be the result of a back or disc problem. The injection goes directly into your epidural space. This is the area in your back around your spinal cord. This injection may have both a local anesthetic and a steroid medicine. Or it may only have a steroid. Local anesthetic medicines numb your nerves right away for a short time. Steroids reduce swelling and pain. But they take a few days to start working. Some people get a series of these injections over weeks or months. How is a lumbar epidural done? The doctor may use an imaging test before or during your injection. This can be an MRI, a CT scan, or an X-ray. These tests can show where your nerve problems are. After finding the right spot, the doctor may inject a numbing medicine into the skin where you will get the steroid injection. Then he or she puts the needle for the steroid into the numbed area. You may feel some pressure. You could feel some stinging or burning during the injection. It takes about 10 to 15 minutes to get this injection. You will probably go home about 20 to 30 minutes after you get it. You will need someone to drive you home. What can you expect after a lumbar epidural?  If your injection had local anesthetic and a steroid, your legs may feel heavy or numb right after. You will probably be able to walk. But you may need to be extra careful. Take care not to lose your balance and be sure to follow your doctor's instructions. If your injection contained local anesthetic, you may feel better right away. But this pain relief will last only a few hours. Your pain will probably return. This is because the steroids have not started working yet.  Before the steroids start to work, your back may be sore for a few days. These injections don't always work. When they do, it takes 1 to 5 days. This pain relief can last for several days to a few months or longer. You may want to do less than normal for a few days. But you may also be able to return to your daily routine. Some people are dizzy or feel sick to their stomach after getting this injection. These symptoms usually do not last very long. If your pain is better, you may be able to keep doing your normal activities or physical therapy. But try not to overdo it, even if your back pain has improved a lot. If your pain is only a little better or if it comes back, your doctor may recommend another injection in a few weeks. If your pain has not changed, talk to your doctor about other treatment choices. Side effects from an epidural steroid injection include headache, fever, or infection. Serious side effects are rare. But they can include stroke, paralysis, or loss of vision. Follow-up care is a key part of your treatment and safety. Be sure to make and go to all appointments, and call your doctor if you are having problems. It's also a good idea to know your test results and keep a list of the medicines you take. Where can you learn more? Go to http://dez-cedric.info/. Enter Beronica Hwang in the search box to learn more about \"Learning About Lumbar Epidural Steroid Injections. \"  Current as of: September 20, 2018  Content Version: 11.9  © 4605-7762 [employer]. Care instructions adapted under license by [employer] (which disclaims liability or warranty for this information). If you have questions about a medical condition or this instruction, always ask your healthcare professional. [place] [address] any warranty or liability for your use of this information.

## 2019-06-21 NOTE — PROGRESS NOTES
8078 Received patient from waiting area. Armband and allergies verbally confirmed with patient. Procedure explained and consents signed. pt states she does not take blood thinner medication  1110 Dr. Emily Rene in to see, consent signed  1120 TRANSFER - OUT REPORT:    Verbal report given to Zarina(name) on Wale Lassiter  being transferred to angio lab(unit) for routine progression of care       Report consisted of patients Situation, Background, Assessment and   Recommendations(SBAR). Information from the following report(s) Procedure Summary was reviewed with the receiving nurse. Lines:       Opportunity for questions and clarification was provided. Patient transported with:   Registered Nurse   1200 TRANSFER - IN REPORT:    Verbal report received from Stefanialuz(name) on Wael Lassiter  being received from angio lab(unit) for routine progression of care      Report consisted of patients Situation, Background, Assessment and   Recommendations(SBAR). Information from the following report(s) Procedure Summary was reviewed with the receiving nurse. Opportunity for questions and clarification was provided. Assessment completed upon patients arrival to unit and care assumed. Pt able to move all extremities, ambulates  Preparing for discharge Copy of printed discharge instructions given to patient and  Patient escorted via wheelchair to entrance. friend driving. Patient discharged into care of friend. Ayush Valentin

## 2019-07-12 ENCOUNTER — PATIENT MESSAGE (OUTPATIENT)
Dept: FAMILY MEDICINE CLINIC | Age: 84
End: 2019-07-12

## 2019-07-29 ENCOUNTER — TELEPHONE (OUTPATIENT)
Dept: FAMILY MEDICINE CLINIC | Age: 84
End: 2019-07-29

## 2019-07-29 NOTE — TELEPHONE ENCOUNTER
----- Message from Peace Roseann sent at 7/29/2019 11:39 AM EDT -----  Regarding: Dr. Knapp Power: [phone]  Appointment Request From: Jarocho Hoskins    With Provider: Corine Fermin MD [facility]    Preferred Date Range: 8/1/2019 - 8/7/2019    Preferred Times: Any time    Reason for visit: Request an Appointment    Comments:  I am in desperate need of shot for rotator cuff tear as it is rendering my arm very painful.  Hopefully I will join my family for vacation Sept. 10-17.  I was confused about scheduling my appointment when I left last time.   I have an appointment scheduled for August 5 but that is a long way off. Darrell Martins does not have to be an appointment for anything except the shot.  Your help would be greatly appreciated. Jarocho Hoskins

## 2019-07-30 NOTE — TELEPHONE ENCOUNTER
Patient is calling in regards to coming in to get steroid injection. Pt that she forgot to make appointment when she left the office last time. Pt stated that she has an appointment in September, pt is requesting to be seen sooner on 8/1/19. Pt stated that if she does not answer, would like for nurse to LVM.      Best callback:  [phone]      LOV: 4/23/2019

## 2019-08-01 ENCOUNTER — OFFICE VISIT (OUTPATIENT)
Dept: FAMILY MEDICINE CLINIC | Age: 84
End: 2019-08-01

## 2019-08-01 VITALS
HEART RATE: 85 BPM | HEIGHT: 64 IN | DIASTOLIC BLOOD PRESSURE: 61 MMHG | TEMPERATURE: 97.5 F | BODY MASS INDEX: 26.53 KG/M2 | WEIGHT: 155.4 LBS | OXYGEN SATURATION: 98 % | SYSTOLIC BLOOD PRESSURE: 129 MMHG | RESPIRATION RATE: 17 BRPM

## 2019-08-01 DIAGNOSIS — M25.512 CHRONIC LEFT SHOULDER PAIN: Primary | ICD-10-CM

## 2019-08-01 DIAGNOSIS — M25.511 CHRONIC RIGHT SHOULDER PAIN: ICD-10-CM

## 2019-08-01 DIAGNOSIS — G89.29 CHRONIC RIGHT SHOULDER PAIN: ICD-10-CM

## 2019-08-01 DIAGNOSIS — G89.29 CHRONIC LEFT SHOULDER PAIN: Primary | ICD-10-CM

## 2019-08-01 RX ORDER — TRIAMCINOLONE ACETONIDE 40 MG/ML
40 INJECTION, SUSPENSION INTRA-ARTICULAR; INTRAMUSCULAR ONCE
Qty: 1 ML | Refills: 0
Start: 2019-08-01 | End: 2019-08-01

## 2019-08-01 RX ORDER — LIDOCAINE HYDROCHLORIDE 10 MG/ML
2 INJECTION, SOLUTION EPIDURAL; INFILTRATION; INTRACAUDAL; PERINEURAL ONCE
Qty: 2 ML | Refills: 0
Start: 2019-08-01 | End: 2019-08-01

## 2019-08-01 NOTE — PROGRESS NOTES
Jayme Treviño [facility]  [address]. [facility], [address]  [phone]             Date of visit: 8/1/19   Subjective:      History obtained from:  the patient. Juancho Mireles is a [address]. female who presents today for both shoulders hurting, francis left  Wants another injection  Last one 3.5 months ago and wore off recently  Has had 3 total, this would be #4  The last injection did work very well  Willing to repeat PT  Notes her right shoulder is more weak than painful  Known OA on xray of left      Patient Active Problem List    Diagnosis Date Noted    Chronic right-sided low back pain with right-sided sciatica 02/06/2019    Spinal stenosis of lumbar region with neurogenic claudication 02/06/2019    Foraminal stenosis of lumbar region 02/06/2019    Chronic renal disease, stage 3, moderately decreased glomerular filtration rate between 30-59 mL/min/1.73 square meter ([facility] [address] 07/19/2018    Osteopenia of multiple sites 02/02/2018    Atopic dermatitis 01/16/2018    Chronic left shoulder pain 01/16/2018    Female pattern hair loss 01/16/2018    OAB (overactive bladder) 11/29/2017    Excessive drinking alcohol 11/29/2017    Postmenopausal HRT (hormone replacement therapy) 39/54/6485    Oral lichen planus 63/66/9474    Balance problem 11/28/2017    Right hip pain 11/28/2017    Chronic foot pain, right 11/28/2017    Neuropathic postherpetic trigeminal neuralgia 11/28/2017    Acquired hypothyroidism 11/28/2017    Essential hypertension 11/28/2017     Current Outpatient Medications   Medication Sig Dispense Refill    lisinopril (PRINIVIL, ZESTRIL) 2.5 mg tablet Take 1 Tab by mouth daily. To cover mail order. 14 Tab 0    gabapentin (NEURONTIN) 300 mg capsule Take 1 Cap by mouth two (2) times a day. 180 Cap 1    levothyroxine (SYNTHROID) 50 mcg tablet Take 1 Tab by mouth daily.  (stop 75mcg dose) 90 Tab 1    alendronate (FOSAMAX) 70 mg tablet Take 1 Tab by mouth every seven (7) days. 4 Tab 0    oxybutynin (DITROPAN) 5 mg tablet Take 1 Tab by mouth daily as needed. 90 Tab 1    doxycycline (ADOXA) 100 mg tablet Take 100 mg by mouth two (2) times a day. AS NEEDED      aspirin delayed-release 81 mg tablet Take  by mouth daily.  CALCIUM CARB/VIT D2/MINERALS (CALTRATE PLUS PO) Take 500 mg by mouth daily. Allergies   Allergen Reactions    Voltaren [Diclofenac Sodium] Anaphylaxis    Foster Bensley Other (comments)    Lyrica [Pregabalin] Drowsiness    Neosporin [Benzalkonium Chloride] Rash     Past Medical History:   Diagnosis Date    Arthritis     Eczema     History of hormone replacement therapy 1987    stopped    Hypertension 3659    Lichen planus     Oral    Menopause 1987    Trigeminal neuralgia      Past Surgical History:   Procedure Laterality Date    HX BREAST BIOPSY Left 2014    Stereo    HX COLONOSCOPY  2012    HX CYST INCISION AND DRAINAGE Right 04/16/2018    benign    HX KNEE REPLACEMENT Right 2014    HX ROTATOR CUFF REPAIR Right     IR INJ FORAMIN EPID LUMB ANES/STER SNGL  2/20/2019    IR INJ FORAMIN EPID LUMB ANES/STER SNGL  6/21/2019     Family History   Problem Relation Age of Onset    Other Mother         severe hot flashes    Heart Failure Mother 76    Breast Cancer Mother 76    Other Daughter         severe hot flashes    Heart Attack Father 46    Breast Cancer Maternal Aunt      Social History     Tobacco Use    Smoking status: Former Smoker    Smokeless tobacco: Never Used   Substance Use Topics    Alcohol use: Yes     Alcohol/week: 14.0 standard drinks     Types: 8 Glasses of wine, 6 Shots of liquor per week     Comment: occ.       Social History     Social History Narrative    [facility]    Daughter Ines Schmitz lives nearby        Review of Systems  Gen: denies fever         Objective:     Vitals:    08/01/19 1531   BP: 129/61   Pulse: 85   Resp: 17   Temp: 97.5 °F (36.4 °C)   TempSrc: Oral SpO2: 98%   Weight: 155 lb 6.4 oz (70.5 kg)   Height: 5' 4\" (1.626 m)     Body mass index is 26.67 kg/m². General: younger than stated age, well developed, well nourished and in NAD  MSK: pain with movements of left shoulder. Muscle wasting  Skin:  Normal. and no rash or abnormalities   Psych: alert and oriented to person, place, time and situation and Speech: appropriate quality, quantity and organization of sentences     Assessment/Plan:       ICD-10-CM ICD-9-CM    1. Chronic left shoulder pain M25.512 719.41 REFERRAL TO PHYSICAL THERAPY    G89.29 338.29 TRIAMCINOLONE ACETONIDE INJ      triamcinolone acetonide (KENALOG) 40 mg/mL injection      lidocaine, PF, (XYLOCAINE) 10 mg/mL (1 %) injection      OH DRAIN/INJECT LARGE JOINT/BURSA   2. Chronic right shoulder pain M25.511 719.41 REFERRAL TO PHYSICAL THERAPY    G89.29 338.29         Orders Placed This Encounter    REFERRAL TO PHYSICAL THERAPY    TRIAMCINOLONE ACETONIDE INJ    76870 - DRAIN/INJECT LARGE JOINT/BURSA    triamcinolone acetonide (KENALOG) 40 mg/mL injection    lidocaine, PF, (XYLOCAINE) 10 mg/mL (1 %) injection       Will do shoulder injection #4  Ok to continue, as she would never want surgery at her age  However, urged her to try PT again for both shoulders, she is willing      Discussed the diagnosis and plan and she expressed understanding.      [facility]  OFFICE PROCEDURE PROGRESS NOTE        Chart reviewed for the following:   Rory Diaz MD, have reviewed the History, Physical and updated the Allergic reactions for [address] performed immediately prior to start of procedure:   Rory Diaz MD, have performed the following reviews on Kaya Gorman prior to the start of the procedure:            * Patient was identified by name and date of birth   * Agreement on procedure being performed was verified  * Risks and Benefits explained to the patient  * Procedure site verified and marked as necessary  * Patient was positioned for comfort  * Consent was signed and verified     Time: 349       Date of procedure: 8/2/2019    Procedure performed by:  Zamzam Garcia MD    Provider assisted by:  none    Patient assisted by: self    How tolerated by patient: tolerated the procedure well with no complications    Post Procedural Pain Scale: 5/10    Comments: After informed consent, the left shoulder joint was each prepped with chloraprep, numbed with cooling spray, and was each injected into the subacromial space from a posterior approach using no-touch sterile technique with 2 mL 1% lidocaine/ 40 mg triamcinolone. Patient tolerated procedure well and there were no complications.     F/u next month as planned for chronic problems      Zamzam Garcia MD

## 2019-08-01 NOTE — PROGRESS NOTES
Chief Complaint   Patient presents with    Shoulder Pain     left shoulder      1. Have you been to the ER, urgent care clinic since your last visit? Hospitalized since your last visit? No    2. Have you seen or consulted any other health care providers outside of the [address] [place] [address] since your last visit? Include any pap smears or colon screening.  No

## 2019-08-02 RX ORDER — LISINOPRIL 2.5 MG/1
2.5 TABLET ORAL DAILY
Qty: 14 TAB | Refills: 0 | Status: SHIPPED | OUTPATIENT
Start: 2019-08-02 | End: 2019-08-05 | Stop reason: SDUPTHER

## 2019-08-02 NOTE — PATIENT INSTRUCTIONS
Joint Injections: Care Instructions Your Care Instructions Joint injections are shots into a joint, such as the knee. They may be used to put in medicines, such as pain relievers. A corticosteroid, or steroid, shot is used to reduce inflammation in tendons or joints. It is often used to treat problems such as arthritis, tendinitis, and bursitis. Steroids can be injected directly into a painful, inflamed joint. They can also help reduce inflammation of a bursa. A bursa is a sac of fluid. It cushions and lubricates areas where tendons, ligaments, skin, muscles, or bones rub against each other. A steroid shot can sometimes help with short-term pain relief when other treatments haven't worked. If steroid shots help, pain may improve for weeks or months. Follow-up care is a key part of your treatment and safety. Be sure to make and go to all appointments, and call your doctor if you are having problems. It's also a good idea to know your test results and keep a list of the medicines you take. How can you care for yourself at home? · Put ice or a cold pack on the area for 10 to 20 minutes at a time. Put a thin cloth between the ice and your skin. · Ask your doctor if you can take an over-the-counter pain medicine, such as acetaminophen (Tylenol), ibuprofen (Advil, Motrin), or naproxen (Aleve). Be safe with medicines. Read and follow all instructions on the label. · Avoid strenuous activities for several days. In particular, avoid ones that put stress on the area where you got the shot. · If you have dressings over the area, keep them clean and dry. You may remove them when your doctor tells you to. When should you call for help? Call your doctor now or seek immediate medical care if: 
  · You have signs of infection, such as: 
? Increased pain, swelling, warmth, or redness. ? Red streaks leading from the site. ? Pus draining from the site. ? A fever.  Watch closely for changes in your health, and be sure to contact your doctor if you have any problems. Where can you learn more? Go to http://dez-cedric.info/. Enter N616 in the search box to learn more about \"Joint Injections: Care Instructions. \" Current as of: September 20, 2018 Content Version: 12.1 © 1032-3755 [employer]. Care instructions adapted under license by [employer] (which disclaims liability or warranty for this information). If you have questions about a medical condition or this instruction, always ask your healthcare professional. [address] any warranty or liability for your use of this information.

## 2019-08-02 NOTE — TELEPHONE ENCOUNTER
From  Venice Thompson To  [facility] Nurse Pool Sent  8/2/2019 11:16 AM   Shot in arm gave 90% relief!!  Do I continue with ice, also?  Before I return from vacation I shall need a refill Lisinopril 2 MG as it take at least 10 days for it to arrive from [address] you for your kindness and consideration and all you do to help me live an independent old life!

## 2019-08-07 ENCOUNTER — OFFICE VISIT (OUTPATIENT)
Dept: FAMILY MEDICINE CLINIC | Age: 84
End: 2019-08-07

## 2019-08-07 DIAGNOSIS — F43.22 ADJUSTMENT DISORDER WITH ANXIETY: Primary | ICD-10-CM

## 2019-08-20 ENCOUNTER — TELEPHONE (OUTPATIENT)
Dept: FAMILY MEDICINE CLINIC | Age: 84
End: 2019-08-20

## 2019-08-20 NOTE — TELEPHONE ENCOUNTER
Wendy Dietrich is calling from [employer] [facility] requesting a copy of the last office note.          Fax# [phone]  Best call# [phone]

## 2019-08-20 NOTE — TELEPHONE ENCOUNTER
Beatrice Anderson from [facility] is calling requesting for a verbal order to for the patient for Home Health Physical Therapy instead of Outpatient. Beatrice Anderson would like a verbal order.        Best callback:  [phone]      LOV:   Thursday, September 5, 2019

## 2019-08-21 NOTE — TELEPHONE ENCOUNTER
Madison Hernández from [facility] is calling requesting for a verbal order to for the patient for [facility] instead of Outpatient Physical Therapy  Madison Hernández would like a verbal order.         Best callback:  [phone]        LOV: Thursday, September 5, 2019

## 2019-08-21 NOTE — TELEPHONE ENCOUNTER
Spoke to [place] from [facility] care. Gave verbal order for in home physical therapy instead of outpatient, per Dr. Hesham Batres.

## 2019-08-21 NOTE — TELEPHONE ENCOUNTER
Requested office notes have been submitted over to [facility].      Confirmatio received back to office

## 2019-09-04 NOTE — PROGRESS NOTES
This mental health therapy note will not be viewable by patient in [address] This mental health therapy note is marked SENSITIVE in [facility] Care. DATE:   8/7/19  SESSION LENGTH:  45 minutes [id number]  PARTICIPANTS:   Jared Hu,  Last Session, Discharge  SUBJECTIVE: (theme of session: patient observations, thoughts, direct quotes, symptoms reported)  Pt presents for session today with written list of talking points and the email she sent to her daughter per last session activity and homework; she reports that she met face to face with her daughter and it went well- she established boundaries of what she could and could not do; she made plan with daughter re future assistance from her; her son is overseeing the financial piece of helping daughter; I feel like this is all planned out well for everyone now and we have all been getting along much better, this has been a Godsend, this counseling help. I do feel like I have accomplished the big part of why I was coming here, can I take some time and come back later if I need help with something else? . OBJECTIVE: (MSE, Screening/Asst Measures, Hx info, Meds, Bx Observations)  Medication Changes? ? No  ? Yes    MENTAL STATUS EXAM:  APPEARANCE ? Clean  ? Neat  ? Unkempt  ? Disheveled     LOOKS STATED AGE ? Yes ? No ? Younger ? Older   EYE CONTACT: ? Poor ? Good  ? Staring ? Avoidant ? Varied ? Erratic   ORIENTATION   ? x4    ? Time  ? Place  ? Person  ? Situation        DEMEANOR   ? Apathetic  ? Boastful  ? Cold   ? Cooperative  ? Covert ? Demanding  ? Dramatic  ? Evasive ? Friendly ? Hostile  ? Irritable ? Seductive  ? Self-Depreciating  ? Guarded  ? Forthcoming   THOUGHT PROCESS ? Normal: logical, tight, linear, coherent, goal directed  ? Abnormal:  ? Circumstantial  ? Tangential ? Loose  ? Flight of Ideas ? Perseveration   ? Word Salad   ? Clanging  ? Thought Blocking          THOUGHT CONTENT ? WNL/Appropriate ? Preoccupations ? Delusions    ?  Ideas of Reference ?Derealization  ? Depersonalization   ? Hallucinating (visual, auditory, tactile):    ? Paranoid   ? Ruminative  ? Intact     ? Derailed thinking      SPEECH ? Clear    ? Slurring    ? Slowed   ? Loud      ? Soft  ? Mute  ? Pressured  ? Excessive   ? Minimal    ? Incoherent   SENSORY DEFICITS ? No Change since last MSE   ? Speech  ? Hearing  ? Vision   INTERPERSONAL ? Interactive  ? Intermittently Interactive   ? Guarded  ? Withdrawn  ? Hostile   AFFECT ? Appropriate  ? Full Range ? Inappropriate  ? Blunted  ? Constricted   ? Flat ? Suspicious ? Guarded  ? Euthymic ? Grandiose  ? Labile   ? Stable  ? Congruent ? Incongruent   ATTENTION ? Attentive  ? Inattentive   ? Distractible    COGNITIVE PERFORMANCE ? Alert  ? Focused ? Organized  ? Disorganized     ? Memory Intact ? Memory Deficient: ? Short-Term  ? Long-Term    ? Developmental Disability  ? Slow Processing   MOOD ? Angry  ? Anxious  ? Ashamed  ? Over Stimulated   ? Depressed   ? Euphoric  ? Relaxed ? Sad  ? Elated ? Worried  ? Hopeful     MOTIVATION ? Good    ? Fair    ? Poor   JUDGEMENT ? Good    ? Fair    ? Poor   INSIGHT ? Good    ? Fair    ?  Poor     RISK ASSESSMENT   Suicide  Current Ideation: denied             Current Plan: denied         Current Attempt: none    Homicide  Current Ideation: denied              Current Plan: denied          Current Attempt: none    Significant Destruction of Property  Current Ideation: denied              Current Plan: denied          Current Attempt: none    ASSESSMENT: (assessment of S/O, content of session, intervention, patient response to intervention, progress in goals)  Pt able to review goals, accomplishments, review areas of continued focus personally were setting boundaries with with others after considering what she is ab le to do comfortably; taking time to make decisions; making decisions to help not enable; self care and space; reviewed social supports and plans for future managing financial issues re to self and children. Pt has done very well in counseling, has done every take home activity with thought, focused intent, has opened up and been able to develop insight and reduce feelings of confusion and guilt re how each child has relationship in her life. Pt progress with goals has been very positive and forward focused throughout counseling. Pt discharged for now with open invite to return to [facility] for future issues/concerns. Goal Revision: ? No  ? Yes  ? N/A    Goal Progress Measures: Progressing, Maintaining, Regressing, Inconsistent, Mastered, Completed, Added New Today, Not Addressed    Trauma Informed Goals:  1. Improve ability to have healthy boundaries with children- Successfully COMPLETED TODAY  2. Increase ability to communicate assertively- Successfully COMPLETED TODAY   3. Improve Self-Care Activities- Successfully Completed Today    4. Connect Patient with [facility] to Support Evidence Based Tx Interventions- Ended   5. Therapist and patient to collaborate with other providers as appropriate - Ended after Today unless/until pt returns for [facility]   6. Therapist and patient to FU with PCP for continuity in plan of care via, Miguel, CC and face to face as clinically indicated- Ended after Today unless/until pt returns for [facility] Work Reviewed:   ? No  ? Yes    ? N/A    Home-Based Work Recommended: ? No  ? Yes ? N/A  -     TRAUMA INFORMED INTERVENTIONS   Cognitive Behavioral Therapy Techniques (CBT):  Explored/Developed Awareness/Increased Insight:  ? Emotions/Feelings ? Coping Patterns ? Relationships ? Self Esteem   ? Boundaries ? Cultural Influences ? Family Influences  Other Techniques:   ? Cognitive Triangle ? Cognitive Challenging ? Cognitive Refocusing  ? Cognitive Reframing  ? Self-Monitoring  ? Supportive Reflection  ? Interactive Feedback ? Interpersonal Resolutions ? Mindfulness  ? Relaxation/ Breathing ?  Role Play/Behavioral Rehearsal  ? Symptom Management Trauma Focused CBT Modules/Techniques (TFCBT):   ? Gradual Exposure/Desensitization  ? Assessment/Engagement ? PsychoEd     ? Self-Care Plan ? Relaxation/Mindfulness ? Affect Modulation  ? Cognitive Coping  ? Trauma Narrative (Exposure/Cognitive Processing)  ? In Vivo Exposure ? Conjoint Narrative- IF CLINICALLY APPROPRIATE   ? Enhancing Future Safety    Motivational Interviewing (MI):   ? Reflective Listening ? Open-Ended Strategies ? Affirmations             ? Supportive Statements ? Exploring Change ? Responding to Sustain Talk   ? Encourage Insight ? Emphasizing Personal Choice/Self-Empowerment        ? Summarizing ? Eliciting Self-Motiv. Statmts   Exploring: ? Problem Recognition ? Concerns ? Intent to Change  ? Optimism    Behavior Activation (BA):   ? Sched. Behavior Activities ? Home-based Assignments      ? Therapist Modeling   ? Having Back-Up Plans                            ? Specific Problem Solving  ? Skills Training and Education  ? Action/TRAP/TRAC Wi-Chi  ? Role Play        Acceptance and Commitment Therapy Techniques (ACT):   ? Present Moment ? Diffusion  ? Acceptance                   ? Self as Context ? Committed Action ? Values        ? Psychological Flexibility    Other Interventions:  ? Rapport Building  ? Assessment  ? Safety Planning  ? Reviewed Safety Plan   ? Play Therapy Techniques ? Art Therapy Techniques ? DBT Technique  ? Structured Problem Solving ? Communication Skills  ? Social Skills  ? Recreation/Leisure Skills ? Self-Care Plan ? Review of All Meds   ? Review of Medical Conditions ? Psychoeducation- Meds   ? Psychoeducation- Other  ? Prevention Services ? Community Based Referral              ? Psychotropic Med Referral: ? PCP ? Psychiatric Provider  ? PCP Referral for Phy Health Issue ?  Psychological Testing Referral       ?Neuropsychological Testing Referral ?Other     PLAN:  Patient requested D/C, reviewed goals, coping skills, CBT Byron with focus on helpful thoughts, planning ahead, accessing supports, pt agreed to return to PCP and/or [facility] if there are additional counseling needs. Referrals: ?  No  ? Yes    ? N/A

## 2019-09-05 ENCOUNTER — HOSPITAL ENCOUNTER (OUTPATIENT)
Dept: LAB | Age: 84
Discharge: HOME | End: 2019-09-05
Payer: MEDICARE

## 2019-09-05 ENCOUNTER — OFFICE VISIT (OUTPATIENT)
Dept: FAMILY MEDICINE CLINIC | Age: 84
End: 2019-09-05

## 2019-09-05 VITALS
OXYGEN SATURATION: 98 % | RESPIRATION RATE: 17 BRPM | HEART RATE: 80 BPM | HEIGHT: 64 IN | BODY MASS INDEX: 26.73 KG/M2 | SYSTOLIC BLOOD PRESSURE: 131 MMHG | TEMPERATURE: 97.9 F | DIASTOLIC BLOOD PRESSURE: 58 MMHG | WEIGHT: 156.6 LBS

## 2019-09-05 DIAGNOSIS — M21.961 ACQUIRED FOOT DEFORMITY, RIGHT: ICD-10-CM

## 2019-09-05 DIAGNOSIS — J30.2 ALLERGIC RHINOCONJUNCTIVITIS, SEASONAL AND PERENNIAL: ICD-10-CM

## 2019-09-05 DIAGNOSIS — I10 ESSENTIAL HYPERTENSION: Primary | ICD-10-CM

## 2019-09-05 DIAGNOSIS — J30.89 ALLERGIC RHINOCONJUNCTIVITIS, SEASONAL AND PERENNIAL: ICD-10-CM

## 2019-09-05 DIAGNOSIS — G89.29 CHRONIC LEFT SHOULDER PAIN: ICD-10-CM

## 2019-09-05 DIAGNOSIS — M85.89 OSTEOPENIA OF MULTIPLE SITES: ICD-10-CM

## 2019-09-05 DIAGNOSIS — Z23 ENCOUNTER FOR IMMUNIZATION: ICD-10-CM

## 2019-09-05 DIAGNOSIS — H10.10 ALLERGIC RHINOCONJUNCTIVITIS, SEASONAL AND PERENNIAL: ICD-10-CM

## 2019-09-05 DIAGNOSIS — R25.2 MUSCLE CRAMPING: ICD-10-CM

## 2019-09-05 DIAGNOSIS — B02.22 NEUROPATHIC POSTHERPETIC TRIGEMINAL NEURALGIA: ICD-10-CM

## 2019-09-05 DIAGNOSIS — E03.9 ACQUIRED HYPOTHYROIDISM: ICD-10-CM

## 2019-09-05 DIAGNOSIS — N18.30 CHRONIC RENAL DISEASE, STAGE 3, MODERATELY DECREASED GLOMERULAR FILTRATION RATE BETWEEN 30-59 ML/MIN/1.73 SQUARE METER (HCC): ICD-10-CM

## 2019-09-05 DIAGNOSIS — M25.512 CHRONIC LEFT SHOULDER PAIN: ICD-10-CM

## 2019-09-05 PROCEDURE — 85025 COMPLETE CBC W/AUTO DIFF WBC: CPT

## 2019-09-05 PROCEDURE — 80053 COMPREHEN METABOLIC PANEL: CPT

## 2019-09-05 PROCEDURE — 84443 ASSAY THYROID STIM HORMONE: CPT

## 2019-09-05 PROCEDURE — 80061 LIPID PANEL: CPT

## 2019-09-05 PROCEDURE — 36415 COLL VENOUS BLD VENIPUNCTURE: CPT

## 2019-09-05 PROCEDURE — 83735 ASSAY OF MAGNESIUM: CPT

## 2019-09-05 RX ORDER — GABAPENTIN 100 MG/1
100 CAPSULE ORAL 3 TIMES DAILY
COMMUNITY
Start: 2019-09-05 | End: 2019-09-24 | Stop reason: SDUPTHER

## 2019-09-05 RX ORDER — GABAPENTIN 300 MG/1
300 CAPSULE ORAL DAILY
COMMUNITY
Start: 2019-09-05 | End: 2019-09-24 | Stop reason: SDUPTHER

## 2019-09-05 NOTE — PATIENT INSTRUCTIONS
DASH Diet: Care Instructions  Your Care Instructions    The DASH diet is an eating plan that can help lower your blood pressure. DASH stands for Dietary Approaches to Stop Hypertension. Hypertension is high blood pressure. The DASH diet focuses on eating foods that are high in calcium, potassium, and magnesium. These nutrients can lower blood pressure. The foods that are highest in these nutrients are fruits, vegetables, low-fat dairy products, nuts, seeds, and legumes. But taking calcium, potassium, and magnesium supplements instead of eating foods that are high in those nutrients does not have the same effect. The DASH diet also includes whole grains, fish, and poultry. The DASH diet is one of several lifestyle changes your doctor may recommend to lower your high blood pressure. Your doctor may also want you to decrease the amount of sodium in your diet. Lowering sodium while following the DASH diet can lower blood pressure even further than just the DASH diet alone. Follow-up care is a key part of your treatment and safety. Be sure to make and go to all appointments, and call your doctor if you are having problems. It's also a good idea to know your test results and keep a list of the medicines you take. How can you care for yourself at home? Following the DASH diet  · Eat 4 to 5 servings of fruit each day. A serving is 1 medium-sized piece of fruit, ½ cup chopped or canned fruit, 1/4 cup dried fruit, or 4 ounces (½ cup) of fruit juice. Choose fruit more often than fruit juice. · Eat 4 to 5 servings of vegetables each day. A serving is 1 cup of lettuce or raw leafy vegetables, ½ cup of chopped or cooked vegetables, or 4 ounces (½ cup) of vegetable juice. Choose vegetables more often than vegetable juice. · Get 2 to 3 servings of low-fat and fat-free dairy each day. A serving is 8 ounces of milk, 1 cup of yogurt, or 1 ½ ounces of cheese. · Eat 6 to 8 servings of grains each day.  A serving is 1 slice of bread, 1 ounce of dry cereal, or ½ cup of cooked rice, pasta, or cooked cereal. Try to choose whole-grain products as much as possible. · Limit lean meat, poultry, and fish to 2 servings each day. A serving is 3 ounces, about the size of a deck of cards. · Eat 4 to 5 servings of nuts, seeds, and legumes (cooked dried beans, lentils, and split peas) each week. A serving is 1/3 cup of nuts, 2 tablespoons of seeds, or ½ cup of cooked beans or peas. · Limit fats and oils to 2 to 3 servings each day. A serving is 1 teaspoon of vegetable oil or 2 tablespoons of salad dressing. · Limit sweets and added sugars to 5 servings or less a week. A serving is 1 tablespoon jelly or jam, ½ cup sorbet, or 1 cup of lemonade. · Eat less than 2,300 milligrams (mg) of sodium a day. If you limit your sodium to 1,500 mg a day, you can lower your blood pressure even more. Tips for success  · Start small. Do not try to make dramatic changes to your diet all at once. You might feel that you are missing out on your favorite foods and then be more likely to not follow the plan. Make small changes, and stick with them. Once those changes become habit, add a few more changes. · Try some of the following:  ? Make it a goal to eat a fruit or vegetable at every meal and at snacks. This will make it easy to get the recommended amount of fruits and vegetables each day. ? Try yogurt topped with fruit and nuts for a snack or healthy dessert. ? Add lettuce, tomato, cucumber, and onion to sandwiches. ? Combine a ready-made pizza crust with low-fat mozzarella cheese and lots of vegetable toppings. Try using tomatoes, squash, spinach, broccoli, carrots, cauliflower, and onions. ? Have a variety of cut-up vegetables with a low-fat dip as an appetizer instead of chips and dip. ? Sprinkle sunflower seeds or chopped almonds over salads. Or try adding chopped walnuts or almonds to cooked vegetables.   ? Try some vegetarian meals using beans and peas. Add garbanzo or kidney beans to salads. Make burritos and tacos with mashed andrews beans or black beans. Where can you learn more? Go to http://dez-cedric.info/. Enter E880 in the search box to learn more about \"DASH Diet: Care Instructions. \"  Current as of: July 22, 2018  Content Version: 12.1  © 4491-1483 Healthwise, [employer]. Care instructions adapted under license by [employer] (which disclaims liability or warranty for this information). If you have questions about a medical condition or this instruction, always ask your healthcare professional. [address] any warranty or liability for your use of this information.

## 2019-09-05 NOTE — PROGRESS NOTES
Jayme Treviño [facility] Neeta. Nikole, [address]  [phone]             Date of visit: 9/5/2019   Subjective:      History obtained from:  the patientTato Garner is a 80 y.o. female who presents today for f/u chronic problems    Wants me to look behind right ear  Thinks infected at times  Better now but wants me to check it  Uses some neosporin    Nails grow and splinter  Going to see At nail clinic and will talk to them  Taking biotin      Large bruise left forearm where it hit the chair rail  Wants to make sure it is not dangerous    Gets another shot in hip in Dec  They did imaging, told her he knew where to shoot it  Every am takes 2 tylenol nad sits on ice, that helps some    Will be starting PT soon for her shoulders    The lisinpril melts on tongue before she can swallow it  Going to check [facility]    Gabapentin taking 300mg in AM and 100mg 3x per day later in the day for trigeminal neuralgia  Tolerates it ok  Left eye itches    Wonders if she can stop aspirin  No history of heart attack or tia    Huge cramp in back on right thigh at times  Sometimes before exercises    Right foot arch has been falling for a long time  Recently the bone is sticking out more  Slowly getting worse  Has custom insoles already  Not wanting surgery    Sees eye doc regularly    Does have allergies with sneezing and coughing  Doesn't want pills  Will try flonsae again    Patient Active Problem List    Diagnosis Date Noted    Allergic rhinoconjunctivitis, seasonal and perennial 09/05/2019    Chronic right-sided low back pain with right-sided sciatica 02/06/2019    Spinal stenosis of lumbar region with neurogenic claudication 02/06/2019    Foraminal stenosis of lumbar region 02/06/2019    Chronic renal disease, stage 3, moderately decreased glomerular filtration rate between 30-59 mL/min/1.73 square meter ([address] 07/19/2018    Osteopenia of multiple sites 02/02/2018    Atopic dermatitis 01/16/2018    Chronic left shoulder pain 01/16/2018    Female pattern hair loss 01/16/2018    OAB (overactive bladder) 11/29/2017    Excessive drinking alcohol 11/29/2017    Postmenopausal HRT (hormone replacement therapy) 80/06/6128    Oral lichen planus 95/55/2828    Balance problem 11/28/2017    Right hip pain 11/28/2017    Chronic foot pain, right 11/28/2017    Neuropathic postherpetic trigeminal neuralgia 11/28/2017    Acquired hypothyroidism 11/28/2017    Essential hypertension 11/28/2017     Current Outpatient Medications   Medication Sig Dispense Refill    gabapentin (NEURONTIN) 300 mg capsule Take 1 Cap by mouth daily. Max Daily Amount: 300 mg. In AM Fax [email or web address] [phone]      gabapentin (NEURONTIN) 100 mg capsule Take 1 Cap by mouth three (3) times daily. Max Daily Amount: 300 mg. Noon, supper, and bedtime, Fax [email or web address] [phone]      lisinopril (PRINIVIL, ZESTRIL) 2.5 mg tablet Take 1 Tab by mouth daily. To cover mail order. 90 Tab 1    levothyroxine (SYNTHROID) 50 mcg tablet Take 1 Tab by mouth daily. (stop 75mcg dose) 90 Tab 1    alendronate (FOSAMAX) 70 mg tablet Take 1 Tab by mouth every seven (7) days. 4 Tab 0    oxybutynin (DITROPAN) 5 mg tablet Take 1 Tab by mouth daily as needed. 90 Tab 1    doxycycline (ADOXA) 100 mg tablet Take 100 mg by mouth two (2) times a day. AS NEEDED      CALCIUM CARB/VIT D2/MINERALS (CALTRATE PLUS PO) Take 500 mg by mouth daily.        Allergies   Allergen Reactions    Voltaren [Diclofenac Sodium] Anaphylaxis    [place] Other (comments)    Lyrica [Pregabalin] Drowsiness    Neosporin [Benzalkonium Chloride] Rash     Past Medical History:   Diagnosis Date    Arthritis     Eczema     History of hormone replacement therapy 1987    stopped    Hypertension 8356    Lichen planus     Oral    Menopause 1987    Trigeminal neuralgia      Past Surgical History:   Procedure Laterality Date    HX BREAST BIOPSY Left 2014    Stereo    HX COLONOSCOPY  2012    HX CYST INCISION AND DRAINAGE Right 04/16/2018    benign    HX KNEE REPLACEMENT Right 2014    HX ROTATOR CUFF REPAIR Right     IR INJ FORAMIN EPID LUMB ANES/STER SNGL  2/20/2019    IR INJ FORAMIN EPID LUMB ANES/STER SNGL  6/21/2019     Family History   Problem Relation Age of Onset    Other Mother         severe hot flashes    Heart Failure Mother 76    Breast Cancer Mother 76    Other Daughter         severe hot flashes    Heart Attack Father 46    Breast Cancer Maternal Aunt      Social History     Tobacco Use    Smoking status: Former Smoker    Smokeless tobacco: Never Used   Substance Use Topics    Alcohol use: Yes     Alcohol/week: 14.0 standard drinks     Types: 8 Glasses of wine, 6 Shots of liquor per week     Comment: occ. Social History     Social History Narrative    Lives in [facility], independent living, [facility]    Daughter Butch Barrios lives nearby        Review of Systems  Gen: denies fever  CV denies ankle edema      Objective:     Vitals:    09/05/19 1118   BP: 131/58   Pulse: 80   Resp: 17   Temp: 97.9 °F (36.6 °C)   TempSrc: Oral   SpO2: 98%   Weight: 156 lb 9.6 oz (71 kg)   Height: 5' 4\" (1.626 m)     Body mass index is 26.88 kg/m². General: stated age, well developed, well nourished and in NAD  Neck: supple, symmetrical, trachea midline, no adenopathy and thyroid: not enlarged, symmetric, no tenderness/mass/nodules  Lungs:  clear to auscultation w/o rales, rhonchi, wheezes w/normal effort and no use of accessory muscles of respiration   Heart: regular rate and rhythm, S1, S2 normal, no murmur, click, rub or gallop  Ext:  No edema noted.  Right foot medial arch with bony prominence about 1.5cm in size, nontender, no swelling currently  Lymph: no cervical adenopathy appreciated  Skin:  Normal. and no rash or abnormalities; small red macule behind right ear where hearing aid puts pressure  Psych: alert and oriented to person, place, time and situation and Speech: appropriate quality, quantity and organization of sentences      Assessment/Plan:       ICD-10-CM ICD-9-CM    1. Essential hypertension I10 401.9 CBC WITH AUTOMATED DIFF      METABOLIC PANEL, COMPREHENSIVE      LIPID PANEL   2. Acquired hypothyroidism E03.9 244.9 TSH 3RD GENERATION   3. Chronic renal disease, stage 3, moderately decreased glomerular filtration rate between 30-59 mL/min/1.73 square meter (HCC) N18.3 585.3    4. Osteopenia of multiple sites M85.89 733.90    5. Muscle cramping R25.2 729.82 MAGNESIUM   6. Neuropathic postherpetic trigeminal neuralgia B02.22 053.12 gabapentin (NEURONTIN) 300 mg capsule      gabapentin (NEURONTIN) 100 mg capsule   7. Acquired foot deformity, right M21.961 736.70    8. Chronic left shoulder pain M25.512 719.41     G89.29 338.29    9. Encounter for immunization Z23 V03.89 INFLUENZA VACCINE INACTIVATED (IIV), SUBUNIT, ADJUVANTED, IM   10. Allergic rhinoconjunctivitis, seasonal and perennial J30.2 477.9     H10.10 372.05     J30.89          Orders Placed This Encounter    Influenza Vaccine Inactivated (IIV)(FLUAD), Subunit, Adjuvanted, IM, ([id number])    CBC WITH AUTOMATED DIFF    METABOLIC PANEL, COMPREHENSIVE    LIPID PANEL    TSH 3RD GENERATION    MAGNESIUM    gabapentin (NEURONTIN) 300 mg capsule    gabapentin (NEURONTIN) 100 mg capsule       Chronic problems stable  Updated her neurontin dose to what she is actually taking  Works well enough for her trigeminal neuralgia  Labs today for chronic problems  Advised water, stretches for the muscle cramps; thankfully they are infrequent for her. Will check lytes as well  Doesn't like allergy pills drying her out, will try flonase    Discussed the diagnosis and plan and she expressed understanding. Follow-up and Dispositions    · Return in about 5 months (around 2/5/2020) for Annual Wellness Visit.          Pilar Castellanos MD

## 2019-09-05 NOTE — PROGRESS NOTES
Chief Complaint   Patient presents with    Hypertension     follow up      1. Have you been to the ER, urgent care clinic since your last visit? Hospitalized since your last visit? No    2. Have you seen or consulted any other health care providers outside of the [address] [place] [address] since your last visit? Include any pap smears or colon screening.  No

## 2019-09-06 LAB
ALBUMIN SERPL-MCNC: 4.2 G/DL (ref 3.2–4.6)
ALBUMIN/GLOB SERPL: 1.7 {RATIO} (ref 1.2–2.2)
ALP SERPL-CCNC: 59 IU/L (ref 39–117)
ALT SERPL-CCNC: 7 IU/L (ref 0–32)
AST SERPL-CCNC: 16 IU/L (ref 0–40)
BASOPHILS # BLD AUTO: 0 X10E3/UL (ref 0–0.2)
BASOPHILS NFR BLD AUTO: 1 %
BILIRUB SERPL-MCNC: 0.4 MG/DL (ref 0–1.2)
BUN SERPL-MCNC: 23 MG/DL (ref 10–36)
BUN/CREAT SERPL: 19 (ref 12–28)
CALCIUM SERPL-MCNC: 9.6 MG/DL (ref 8.7–10.3)
CHLORIDE SERPL-SCNC: 104 MMOL/L (ref 96–106)
CHOLEST SERPL-MCNC: 235 MG/DL (ref 100–199)
CO2 SERPL-SCNC: 20 MMOL/L (ref 20–29)
CREAT SERPL-MCNC: 1.23 MG/DL (ref 0.57–1)
EOSINOPHIL # BLD AUTO: 0.1 X10E3/UL (ref 0–0.4)
EOSINOPHIL NFR BLD AUTO: 2 %
ERYTHROCYTE [DISTWIDTH] IN BLOOD BY AUTOMATED COUNT: 16.9 % (ref 12.3–15.4)
GLOBULIN SER CALC-MCNC: 2.5 G/DL (ref 1.5–4.5)
GLUCOSE SERPL-MCNC: 91 MG/DL (ref 65–99)
HCT VFR BLD AUTO: 31.1 % (ref 34–46.6)
HDLC SERPL-MCNC: 74 MG/DL
HGB BLD-MCNC: 9.4 G/DL (ref 11.1–15.9)
IMM GRANULOCYTES # BLD AUTO: 0 X10E3/UL (ref 0–0.1)
IMM GRANULOCYTES NFR BLD AUTO: 0 %
INTERPRETATION, 910389: NORMAL
INTERPRETATION: NORMAL
LDLC SERPL CALC-MCNC: 141 MG/DL (ref 0–99)
LYMPHOCYTES # BLD AUTO: 1.3 X10E3/UL (ref 0.7–3.1)
LYMPHOCYTES NFR BLD AUTO: 21 %
MAGNESIUM SERPL-MCNC: 2.3 MG/DL (ref 1.6–2.3)
MCH RBC QN AUTO: 22.8 PG (ref 26.6–33)
MCHC RBC AUTO-ENTMCNC: 30.2 G/DL (ref 31.5–35.7)
MCV RBC AUTO: 76 FL (ref 79–97)
MONOCYTES # BLD AUTO: 0.5 X10E3/UL (ref 0.1–0.9)
MONOCYTES NFR BLD AUTO: 9 %
NEUTROPHILS # BLD AUTO: 4.1 X10E3/UL (ref 1.4–7)
NEUTROPHILS NFR BLD AUTO: 67 %
PDF IMAGE, 910387: NORMAL
PLATELET # BLD AUTO: 416 X10E3/UL (ref 150–450)
POTASSIUM SERPL-SCNC: 5.2 MMOL/L (ref 3.5–5.2)
PROT SERPL-MCNC: 6.7 G/DL (ref 6–8.5)
RBC # BLD AUTO: 4.12 X10E6/UL (ref 3.77–5.28)
SODIUM SERPL-SCNC: 141 MMOL/L (ref 134–144)
TRIGL SERPL-MCNC: 99 MG/DL (ref 0–149)
TSH SERPL DL<=0.005 MIU/L-ACNC: 2.21 UIU/ML (ref 0.45–4.5)
VLDLC SERPL CALC-MCNC: 20 MG/DL (ref 5–40)
WBC # BLD AUTO: 6 X10E3/UL (ref 3.4–10.8)

## 2019-09-11 ENCOUNTER — TELEPHONE (OUTPATIENT)
Dept: FAMILY MEDICINE CLINIC | Age: 84
End: 2019-09-11

## 2019-09-11 NOTE — TELEPHONE ENCOUNTER
Spoke to Russel. Pt doesn't qualify for home health. Pt isn't home bound. Russel thinks it would be better for pt to go out for PT anyway b/c they have more equipment for pt's to work with. Russel found a PT place close to pt and pt is agreeable to going there. The place is [facility]. We need to fax an order over and they will contact pt to set up an appt. Fax # by [phone].

## 2019-09-11 NOTE — TELEPHONE ENCOUNTER
Kaylee Hung from [facility] is calling requesting to speak with the nurse in regards to a home health order that was received, pt is not home bound instead home health Kaylee Hung is requesting for Outpatient PT.       Best callback:  [phone]      LOV: Thursday, September 05, 2019

## 2019-09-12 ENCOUNTER — HOSPITAL ENCOUNTER (OUTPATIENT)
Dept: LAB | Age: 84
Discharge: HOME | End: 2019-09-12
Payer: MEDICARE

## 2019-09-12 ENCOUNTER — OFFICE VISIT (OUTPATIENT)
Dept: FAMILY MEDICINE CLINIC | Age: 84
End: 2019-09-12

## 2019-09-12 ENCOUNTER — TELEPHONE (OUTPATIENT)
Dept: FAMILY MEDICINE CLINIC | Age: 84
End: 2019-09-12

## 2019-09-12 VITALS
SYSTOLIC BLOOD PRESSURE: 137 MMHG | DIASTOLIC BLOOD PRESSURE: 81 MMHG | HEART RATE: 83 BPM | HEIGHT: 64 IN | RESPIRATION RATE: 18 BRPM | WEIGHT: 155 LBS | TEMPERATURE: 98 F | BODY MASS INDEX: 26.46 KG/M2 | OXYGEN SATURATION: 99 %

## 2019-09-12 DIAGNOSIS — D50.9 MICROCYTIC ANEMIA: Primary | ICD-10-CM

## 2019-09-12 PROCEDURE — 83550 IRON BINDING TEST: CPT

## 2019-09-12 PROCEDURE — 82728 ASSAY OF FERRITIN: CPT

## 2019-09-12 PROCEDURE — 85025 COMPLETE CBC W/AUTO DIFF WBC: CPT

## 2019-09-12 NOTE — TELEPHONE ENCOUNTER
----- Message from Jose Porras MD sent at 9/12/2019  7:43 AM EDT -----  Regarding: today 11:00  Hi, I need to see her for a brief appointment today at 6, if you could please schedule. I told her via [phone] she could come in. This is for anemia. Thank you!! Jose Porras MD 9/12/2019 7:44 AM     Pt.  Is scheduled to come in today to see dr. Raven Harding for Anemia @ 11 am per dr. Josefa Lockhart request.

## 2019-09-12 NOTE — PROGRESS NOTES
Chief Complaint   Patient presents with    Anemia     follow up      1. Have you been to the ER, urgent care clinic since your last visit? Hospitalized since your last visit? No    2. Have you seen or consulted any other health care providers outside of the [address] [place] [address] since your last visit? Include any pap smears or colon screening.  No

## 2019-09-12 NOTE — PROGRESS NOTES
[facility]. [employer], [address]  [phone]             Date of visit: 9/12/19   Subjective:      History obtained from:  the patient. Dorinda Duggan is a 80 y.o. female who presents today for new anemia  I asked her to come back in so we can do more testing, including hemoccult stool  She has not seen bleeding from anywhere  Has very rare indigestion, very mild, otherwise no stomach pain, heartburn, diarrhea, constipation. No urinary pain. Feels tired but gnerally ok      Patient Active Problem List    Diagnosis Date Noted    Allergic rhinoconjunctivitis, seasonal and perennial 09/05/2019    Chronic right-sided low back pain with right-sided sciatica 02/06/2019    Spinal stenosis of lumbar region with neurogenic claudication 02/06/2019    Foraminal stenosis of lumbar region 02/06/2019    Chronic renal disease, stage 3, moderately decreased glomerular filtration rate between 30-59 mL/min/1.73 square meter ([facility] [address] 07/19/2018    Osteopenia of multiple sites 02/02/2018    Atopic dermatitis 01/16/2018    Chronic left shoulder pain 01/16/2018    Female pattern hair loss 01/16/2018    OAB (overactive bladder) 11/29/2017    Excessive drinking alcohol 11/29/2017    Postmenopausal HRT (hormone replacement therapy) 77/95/3717    Oral lichen planus 71/32/4127    Balance problem 11/28/2017    Right hip pain 11/28/2017    Chronic foot pain, right 11/28/2017    Neuropathic postherpetic trigeminal neuralgia 11/28/2017    Acquired hypothyroidism 11/28/2017    Essential hypertension 11/28/2017     Current Outpatient Medications   Medication Sig Dispense Refill    gabapentin (NEURONTIN) 300 mg capsule Take 1 Cap by mouth daily. Max Daily Amount: 300 mg. In AM Fax [employer] [phone]      gabapentin (NEURONTIN) 100 mg capsule Take 1 Cap by mouth three (3) times daily. Max Daily Amount: 300 mg.  Noon, supper, and bedtime, Fax [employer] [phone]     Meredith Harrison lisinopril (PRINIVIL, ZESTRIL) 2.5 mg tablet Take 1 Tab by mouth daily. To cover mail order. 90 Tab 1    levothyroxine (SYNTHROID) 50 mcg tablet Take 1 Tab by mouth daily. (stop 75mcg dose) 90 Tab 1    alendronate (FOSAMAX) 70 mg tablet Take 1 Tab by mouth every seven (7) days. 4 Tab 0    oxybutynin (DITROPAN) 5 mg tablet Take 1 Tab by mouth daily as needed. 90 Tab 1    doxycycline (ADOXA) 100 mg tablet Take 100 mg by mouth two (2) times a day. AS NEEDED      CALCIUM CARB/VIT D2/MINERALS (CALTRATE PLUS PO) Take 500 mg by mouth daily. Allergies   Allergen Reactions    Voltaren [Diclofenac Sodium] Anaphylaxis    [place] Other (comments)    Lyrica [Pregabalin] Drowsiness    Neosporin [Benzalkonium Chloride] Rash     Past Medical History:   Diagnosis Date    Arthritis     Eczema     History of hormone replacement therapy 1987    stopped    Hypertension 8957    Lichen planus     Oral    Menopause 1987    Trigeminal neuralgia      Past Surgical History:   Procedure Laterality Date    HX BREAST BIOPSY Left 2014    Stereo    HX COLONOSCOPY  2012    HX CYST INCISION AND DRAINAGE Right 04/16/2018    benign    HX KNEE REPLACEMENT Right 2014    HX ROTATOR CUFF REPAIR Right     IR INJ FORAMIN EPID LUMB ANES/STER SNGL  2/20/2019    IR INJ FORAMIN EPID LUMB ANES/STER SNGL  6/21/2019     Family History   Problem Relation Age of Onset    Other Mother         severe hot flashes    Heart Failure Mother 76    Breast Cancer Mother 76    Other Daughter         severe hot flashes    Heart Attack Father 46    Breast Cancer Maternal Aunt      Social History     Tobacco Use    Smoking status: Former Smoker    Smokeless tobacco: Never Used   Substance Use Topics    Alcohol use: Yes     Alcohol/week: 14.0 standard drinks     Types: 8 Glasses of wine, 6 Shots of liquor per week     Comment: occ.       Social History     Social History Narrative    Lives in [facility], independent living, [facility] Daughter Rebeca Wheat lives nearby        Review of Systems  GI: denies hematochezia   denies vaginal bleeding     Objective:     Vitals:    09/12/19 1124   BP: 137/81   Pulse: 83   Resp: 18   Temp: 98 °F (36.7 °C)   TempSrc: Oral   SpO2: 99%   Weight: 155 lb (70.3 kg)   Height: 5' 4\" (1.626 m)     Body mass index is 26.61 kg/m². General: stated age, well developed, well nourished and in NAD  Abdomen: soft, nontender, no masses  Rect: no masses, brown stool hemoccult negative  Ext:  No edema noted. Skin:  Normal. and no rash or abnormalities   Psych: alert and oriented to person, place, time and situation and Speech: appropriate quality, quantity and organization of sentences      Assessment/Plan:       ICD-10-CM ICD-9-CM    1. Microcytic anemia D50.9 280.9 CBC WITH AUTOMATED DIFF      IRON PROFILE      FERRITIN      PATHOLOGIST REVIEW SMEARS        Orders Placed This Encounter    CBC WITH AUTOMATED DIFF    IRON PROFILE    FERRITIN    PATHOLOGIST REVIEW SMEARS     May need upper endoscopy although is reassuring her stool is heme negative right now  Do labs as above and will make plan from there    Discussed the diagnosis and plan and she expressed understanding. Follow-up and Dispositions    · Return if symptoms worsen or fail to improve.          Zachary Rojas MD

## 2019-09-13 DIAGNOSIS — D50.9 IRON DEFICIENCY ANEMIA, UNSPECIFIED IRON DEFICIENCY ANEMIA TYPE: Primary | ICD-10-CM

## 2019-09-13 LAB
BASOPHILS # BLD AUTO: 0.1 X10E3/UL (ref 0–0.2)
BASOPHILS NFR BLD AUTO: 1 %
EOSINOPHIL # BLD AUTO: 0.1 X10E3/UL (ref 0–0.4)
EOSINOPHIL NFR BLD AUTO: 2 %
ERYTHROCYTE [DISTWIDTH] IN BLOOD BY AUTOMATED COUNT: 15.6 % (ref 12.3–15.4)
FERRITIN SERPL-MCNC: 12 NG/ML (ref 15–150)
HCT VFR BLD AUTO: 31 % (ref 34–46.6)
HGB BLD-MCNC: 9.4 G/DL (ref 11.1–15.9)
IMM GRANULOCYTES # BLD AUTO: 0 X10E3/UL (ref 0–0.1)
IMM GRANULOCYTES NFR BLD AUTO: 0 %
IRON SATN MFR SERPL: 3 % (ref 15–55)
IRON SERPL-MCNC: 13 UG/DL (ref 27–139)
LYMPHOCYTES # BLD AUTO: 1.2 X10E3/UL (ref 0.7–3.1)
LYMPHOCYTES NFR BLD AUTO: 20 %
MCH RBC QN AUTO: 22.4 PG (ref 26.6–33)
MCHC RBC AUTO-ENTMCNC: 30.3 G/DL (ref 31.5–35.7)
MCV RBC AUTO: 74 FL (ref 79–97)
MONOCYTES # BLD AUTO: 0.5 X10E3/UL (ref 0.1–0.9)
MONOCYTES NFR BLD AUTO: 9 %
NEUTROPHILS # BLD AUTO: 4.2 X10E3/UL (ref 1.4–7)
NEUTROPHILS NFR BLD AUTO: 68 %
PLATELET # BLD AUTO: 419 X10E3/UL (ref 150–450)
RBC # BLD AUTO: 4.19 X10E6/UL (ref 3.77–5.28)
TIBC SERPL-MCNC: 435 UG/DL (ref 250–450)
UIBC SERPL-MCNC: 422 UG/DL (ref 118–369)
WBC # BLD AUTO: 6.2 X10E3/UL (ref 3.4–10.8)

## 2019-09-13 RX ORDER — FERROUS SULFATE 325(65) MG
325 TABLET, DELAYED RELEASE (ENTERIC COATED) ORAL EVERY OTHER DAY
Qty: 45 TAB | Refills: 0 | Status: SHIPPED | OUTPATIENT
Start: 2019-09-13 | End: 2019-09-23 | Stop reason: SDUPTHER

## 2019-09-13 NOTE — PATIENT INSTRUCTIONS
Iron Deficiency Anemia: Care Instructions  Your Care Instructions    Anemia means that you do not have enough red blood cells. Red blood cells carry oxygen around your body. When you have anemia, it can make you pale, weak, and tired. Many things can cause anemia. The most common cause is loss of blood. This can happen if you have heavy menstrual periods. It can also happen if you have bleeding in your stomach or bowel. You can also get anemia if you don't have enough iron in your diet or if it's hard for your body to absorb iron. In some cases, pregnancy causes anemia. That's because a pregnant woman needs more iron. Your doctor may do more tests to find the cause of your anemia. If a disease or other health problem is causing it, your doctor will treat that problem. It's important to follow up with your doctor to make sure that your iron level returns to normal.  Follow-up care is a key part of your treatment and safety. Be sure to make and go to all appointments, and call your doctor if you are having problems. It's also a good idea to know your test results and keep a list of the medicines you take. How can you care for yourself at home? · If your doctor recommended iron pills, take them as directed. ? Try to take the pills on an empty stomach. You can do this about 1 hour before or 2 hours after meals. But you may need to take iron with food to avoid an upset stomach. ? Do not take antacids or drink milk or anything with caffeine within 2 hours of when you take your iron. They can keep your body from absorbing the iron well. ? Vitamin C helps your body absorb iron. You may want to take iron pills with a glass of orange juice or some other food high in vitamin C.  ? Iron pills may cause stomach problems. These include heartburn, nausea, diarrhea, constipation, and cramps. It can help to drink plenty of fluids and include fruits, vegetables, and fiber in your diet.   ? It's normal for iron pills to make your stool a greenish or grayish black. But internal bleeding can also cause dark stool. So it's important to tell your doctor about any color changes. ? Call your doctor if you think you are having a problem with your iron pills. Even after you start to feel better, it will take several months for your body to build up its supply of iron. ? If you miss a pill, don't take a double dose. ? Keep iron pills out of the reach of small children. Too much iron can be very dangerous. · Eat foods with a lot of iron. These include red meat, shellfish, poultry, and eggs. They also include beans, raisins, whole-grain bread, and leafy green vegetables. · Steam your vegetables. This is the best way to prepare them if you want to get as much iron as possible. · Be safe with medicines. Do not take nonsteroidal anti-inflammatory pain relievers unless your doctor tells you to. These include aspirin, naproxen (Aleve), and ibuprofen (Advil, Motrin). · Liquid iron can stain your teeth. But you can mix it with water or juice and drink it with a straw. Then it won't get on your teeth. When should you call for help? Call 911 anytime you think you may need emergency care. For example, call if:    · You passed out (lost consciousness).    Call your doctor now or seek immediate medical care if:    · You are short of breath.     · You are dizzy or light-headed, or you feel like you may faint.     · You have new or worse bleeding.    Watch closely for changes in your health, and be sure to contact your doctor if:    · You feel weaker or more tired than usual.     · You do not get better as expected. Where can you learn more? Go to http://dez-cedric.info/. Enter W223 in the search box to learn more about \"Iron Deficiency Anemia: Care Instructions. \"  Current as of: March 28, 2019  Content Version: 12.1  © 2880-8801 [employer]wise[employer].  Care instructions adapted under license by [employer] (which disclaims liability or warranty for this information). If you have questions about a medical condition or this instruction, always ask your healthcare professional. [address] any warranty or liability for your use of this information.

## 2019-09-16 ENCOUNTER — TELEPHONE (OUTPATIENT)
Dept: FAMILY MEDICINE CLINIC | Age: 84
End: 2019-09-16

## 2019-09-16 NOTE — TELEPHONE ENCOUNTER
[facility] from [facility] is calling requesting for the patients PT order to be faxed over.       Fax: [phone]      Best callback:  [phone]      LOV:  Thursday, September 12, 2019

## 2019-09-19 LAB
BASOPHILS # BLD AUTO: 0.1 X10E3/UL (ref 0–0.2)
BASOPHILS NFR BLD AUTO: 1 %
EOSINOPHIL # BLD AUTO: 0.1 X10E3/UL (ref 0–0.4)
EOSINOPHIL NFR BLD AUTO: 1 %
ERYTHROCYTE [DISTWIDTH] IN BLOOD BY AUTOMATED COUNT: 15.4 % (ref 12.3–15.4)
HCT VFR BLD AUTO: 31.4 % (ref 34–46.6)
HGB BLD-MCNC: 9.2 G/DL (ref 11.1–15.9)
IMM GRANULOCYTES # BLD AUTO: 0 X10E3/UL (ref 0–0.1)
IMM GRANULOCYTES NFR BLD AUTO: 0 %
LYMPHOCYTES # BLD AUTO: 1.2 X10E3/UL (ref 0.7–3.1)
LYMPHOCYTES NFR BLD AUTO: 19 %
MCH RBC QN AUTO: 21.7 PG (ref 26.6–33)
MCHC RBC AUTO-ENTMCNC: 29.3 G/DL (ref 31.5–35.7)
MCV RBC AUTO: 74 FL (ref 79–97)
MONOCYTES # BLD AUTO: 0.5 X10E3/UL (ref 0.1–0.9)
MONOCYTES NFR BLD AUTO: 9 %
NEUTROPHILS # BLD AUTO: 4.2 X10E3/UL (ref 1.4–7)
NEUTROPHILS NFR BLD AUTO: 70 %
PATH REV BLD -IMP: ABNORMAL
PATHOLOGIST NAME: ABNORMAL
PLATELET # BLD AUTO: 436 X10E3/UL (ref 150–450)
RBC # BLD AUTO: 4.24 X10E6/UL (ref 3.77–5.28)
WBC # BLD AUTO: 6.1 X10E3/UL (ref 3.4–10.8)

## 2019-09-22 ENCOUNTER — PATIENT MESSAGE (OUTPATIENT)
Dept: FAMILY MEDICINE CLINIC | Age: 84
End: 2019-09-22

## 2019-09-23 RX ORDER — FERROUS SULFATE 325(65) MG
325 TABLET, DELAYED RELEASE (ENTERIC COATED) ORAL EVERY OTHER DAY
Qty: 45 TAB | Refills: 0 | Status: SHIPPED | OUTPATIENT
Start: 2019-09-23 | End: 2019-10-09 | Stop reason: SDUPTHER

## 2019-09-23 RX ORDER — LISINOPRIL 2.5 MG/1
2.5 TABLET ORAL DAILY
Qty: 90 TAB | Refills: 1 | Status: SHIPPED | OUTPATIENT
Start: 2019-09-23 | End: 2019-12-07 | Stop reason: SDUPTHER

## 2019-09-23 RX ORDER — LEVOTHYROXINE SODIUM 50 UG/1
50 TABLET ORAL DAILY
Qty: 90 TAB | Refills: 3 | Status: SHIPPED | OUTPATIENT
Start: 2019-09-23 | End: 2019-12-07 | Stop reason: SDUPTHER

## 2019-09-23 RX ORDER — ALENDRONATE SODIUM 70 MG/1
70 TABLET ORAL
Qty: 13 TAB | Refills: 3 | Status: SHIPPED | OUTPATIENT
Start: 2019-09-23 | End: 2020-09-28 | Stop reason: SDUPTHER

## 2019-09-24 DIAGNOSIS — B02.22 NEUROPATHIC POSTHERPETIC TRIGEMINAL NEURALGIA: ICD-10-CM

## 2019-09-24 RX ORDER — GABAPENTIN 100 MG/1
100 CAPSULE ORAL 2 TIMES DAILY
Qty: 180 CAP | Refills: 1 | Status: SHIPPED | OUTPATIENT
Start: 2019-09-24 | End: 2019-12-07 | Stop reason: SDUPTHER

## 2019-10-09 RX ORDER — FERROUS SULFATE 325(65) MG
325 TABLET, DELAYED RELEASE (ENTERIC COATED) ORAL EVERY OTHER DAY
Qty: 45 TAB | Refills: 0 | Status: SHIPPED | OUTPATIENT
Start: 2019-10-09 | End: 2019-10-21 | Stop reason: SINTOL

## 2019-11-16 ENCOUNTER — PATIENT MESSAGE (OUTPATIENT)
Dept: FAMILY MEDICINE CLINIC | Age: 84
End: 2019-11-16

## 2019-11-16 DIAGNOSIS — G89.29 CHRONIC RIGHT-SIDED LOW BACK PAIN WITH RIGHT-SIDED SCIATICA: Primary | ICD-10-CM

## 2019-11-16 DIAGNOSIS — M54.41 CHRONIC RIGHT-SIDED LOW BACK PAIN WITH RIGHT-SIDED SCIATICA: Primary | ICD-10-CM

## 2019-11-22 ENCOUNTER — HOSPITAL ENCOUNTER (OUTPATIENT)
Dept: LAB | Age: 84
Discharge: HOME | End: 2019-11-22
Payer: MEDICARE

## 2019-11-22 PROCEDURE — 83550 IRON BINDING TEST: CPT

## 2019-11-22 PROCEDURE — 85025 COMPLETE CBC W/AUTO DIFF WBC: CPT

## 2019-11-26 RX ORDER — FERROUS SULFATE 325(65) MG
325 TABLET, DELAYED RELEASE (ENTERIC COATED) ORAL EVERY OTHER DAY
Qty: 45 TAB | Refills: 0 | Status: SHIPPED | OUTPATIENT
Start: 2019-11-26 | End: 2020-01-23

## 2019-12-02 ENCOUNTER — TELEPHONE (OUTPATIENT)
Dept: FAMILY MEDICINE CLINIC | Age: 84
End: 2019-12-02

## 2019-12-02 LAB
BASOPHILS # BLD AUTO: NORMAL 10*3/UL
EOSINOPHIL # BLD AUTO: NORMAL 10*3/UL
EOSINOPHIL NFR BLD AUTO: NORMAL %
HCT VFR BLD AUTO: NORMAL %
HGB BLD-MCNC: NORMAL G/DL
LYMPHOCYTES # BLD AUTO: NORMAL 10*3/UL
LYMPHOCYTES NFR BLD AUTO: NORMAL %
MONOCYTES NFR BLD AUTO: NORMAL %
NEUTROPHILS NFR BLD AUTO: NORMAL %
PLATELET # BLD AUTO: NORMAL 10*3/UL
RBC # BLD AUTO: NORMAL 10*6/UL
WBC # BLD AUTO: NORMAL X10E3/UL

## 2019-12-02 RX ORDER — FLUOCINONIDE GEL 0.5 MG/G
GEL TOPICAL
Qty: 30 G | Refills: 1 | Status: SHIPPED | OUTPATIENT
Start: 2019-12-02 | End: 2020-11-19 | Stop reason: SDUPTHER

## 2019-12-02 NOTE — TELEPHONE ENCOUNTER
Is it possible for you to also send order for fluocinonide . 05% gel form. My former dermatologist gave me this for eczema and and hopefully you can have this filled for me.

## 2019-12-07 DIAGNOSIS — B02.22 NEUROPATHIC POSTHERPETIC TRIGEMINAL NEURALGIA: ICD-10-CM

## 2019-12-07 RX ORDER — LEVOTHYROXINE SODIUM 50 UG/1
50 TABLET ORAL DAILY
Qty: 90 TAB | Refills: 3 | Status: SHIPPED | OUTPATIENT
Start: 2019-12-07 | End: 2020-01-23 | Stop reason: SDUPTHER

## 2019-12-07 RX ORDER — LISINOPRIL 2.5 MG/1
2.5 TABLET ORAL DAILY
Qty: 30 TAB | Refills: 0 | Status: SHIPPED | OUTPATIENT
Start: 2019-12-07 | End: 2019-12-07 | Stop reason: SDUPTHER

## 2019-12-07 RX ORDER — LISINOPRIL 2.5 MG/1
2.5 TABLET ORAL DAILY
Qty: 90 TAB | Refills: 3 | Status: SHIPPED | OUTPATIENT
Start: 2019-12-07 | End: 2020-01-05 | Stop reason: SDUPTHER

## 2019-12-07 RX ORDER — GABAPENTIN 100 MG/1
100 CAPSULE ORAL 2 TIMES DAILY
Qty: 180 CAP | Refills: 1 | Status: SHIPPED | OUTPATIENT
Start: 2019-12-07 | End: 2019-12-31 | Stop reason: SDUPTHER

## 2019-12-12 ENCOUNTER — OFFICE VISIT (OUTPATIENT)
Dept: FAMILY MEDICINE CLINIC | Age: 84
End: 2019-12-12

## 2019-12-12 VITALS
DIASTOLIC BLOOD PRESSURE: 78 MMHG | TEMPERATURE: 98.2 F | OXYGEN SATURATION: 99 % | BODY MASS INDEX: 26.63 KG/M2 | RESPIRATION RATE: 18 BRPM | HEART RATE: 95 BPM | HEIGHT: 64 IN | WEIGHT: 156 LBS | SYSTOLIC BLOOD PRESSURE: 150 MMHG

## 2019-12-12 DIAGNOSIS — M79.641 RIGHT HAND PAIN: Primary | ICD-10-CM

## 2019-12-12 DIAGNOSIS — M54.41 CHRONIC RIGHT-SIDED LOW BACK PAIN WITH RIGHT-SIDED SCIATICA: ICD-10-CM

## 2019-12-12 DIAGNOSIS — M54.9 UPPER BACK PAIN, CHRONIC: ICD-10-CM

## 2019-12-12 DIAGNOSIS — G89.29 CHRONIC RIGHT-SIDED LOW BACK PAIN WITH RIGHT-SIDED SCIATICA: ICD-10-CM

## 2019-12-12 DIAGNOSIS — R26.89 BALANCE DISORDER: ICD-10-CM

## 2019-12-12 DIAGNOSIS — G89.29 UPPER BACK PAIN, CHRONIC: ICD-10-CM

## 2019-12-12 DIAGNOSIS — Z23 ENCOUNTER FOR IMMUNIZATION: ICD-10-CM

## 2019-12-12 DIAGNOSIS — I10 ESSENTIAL HYPERTENSION: ICD-10-CM

## 2019-12-12 DIAGNOSIS — D50.9 IRON DEFICIENCY ANEMIA, UNSPECIFIED IRON DEFICIENCY ANEMIA TYPE: ICD-10-CM

## 2019-12-12 NOTE — PROGRESS NOTES
Jayme Treviño [facility]  [address]. [place], [address]  [phone]             Date of visit: 12/12/2019   Subjective:      History obtained from:  the patient. Warren Fu is a 80 y.o. female who presents today for pain in right hand  One day in therapy was swollen really big in right ulnar side of hand. Still having pain shooting down fingers. For 3 weeks of PT had no problems  The swelling and pain came up very suddenly when she was 3 weeks into PT  Is right handed  Wondered if the mouse might be a problem  No swelling in other hand  Can get rings on other hand  Doesn't hurt, this is only the right hand bothering her  No history of problems with the hand in the past  No pain down the arm, only in her fingers, mainly the 4th5th fingers. The heat helps some, can't say tylenol helps.     Getting HARSHAD next Friday  Really having a lot of pain  Still doing neck/upper back exercises    Has iron deficiency anemia slightly improved when rechecked last month  Repeatedly has declined endoscopy   Eating high iron foods, can't tolerate the pills    Hypertension consistently ok at home    Due for 2nd pneumonia shot, willing to do today      Patient Active Problem List    Diagnosis Date Noted    Iron deficiency anemia 12/12/2019    Allergic rhinoconjunctivitis, seasonal and perennial 09/05/2019    Chronic right-sided low back pain with right-sided sciatica 02/06/2019    Spinal stenosis of lumbar region with neurogenic claudication 02/06/2019    Foraminal stenosis of lumbar region 02/06/2019    Chronic renal disease, stage 3, moderately decreased glomerular filtration rate between 30-59 mL/min/1.73 square meter ([facility] [address] 07/19/2018    Osteopenia of multiple sites 02/02/2018    Atopic dermatitis 01/16/2018    Chronic left shoulder pain 01/16/2018    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 90 Tab 3    gabapentin (NEURONTIN) 100 mg capsule Take 1 Cap by mouth two (2) times a day. Max Daily Amount: 200 mg. Fax [facility] [phone] 180 Cap 1    levothyroxine (SYNTHROID) 50 mcg tablet Take 1 Tab by mouth daily. (stop 75mcg dose) 90 Tab 3    fluocinonide (LIDEX) 0.05 % topical gel Apply  to affected area two (2) times daily as needed (eczema). 30 g 1    alendronate (FOSAMAX) 70 mg tablet Take 1 Tab by mouth every seven (7) days. 13 Tab 3    oxybutynin (DITROPAN) 5 mg tablet Take 1 Tab by mouth daily as needed. 90 Tab 1    doxycycline (ADOXA) 100 mg tablet Take 100 mg by mouth two (2) times a day. AS NEEDED      CALCIUM CARB/VIT D2/MINERALS (CALTRATE PLUS PO) Take 500 mg by mouth daily.  ferrous sulfate (IRON) 325 mg (65 mg iron) EC tablet Take 1 Tab by mouth every other day.  39 Tab 0     Allergies   Allergen Reactions    Voltaren [Diclofenac Sodium] Anaphylaxis    [place] Other (comments)    Iron Other (comments)     GI UPSET AND DIZZINESS     Lyrica [Pregabalin] Drowsiness    Neosporin [Benzalkonium Chloride] Rash     Past Medical History:   Diagnosis Date    Arthritis     Eczema     History of hormone replacement therapy 1987    stopped    Hypertension 5909    Lichen planus     Oral    Menopause 1987    Trigeminal neuralgia      Past Surgical History:   Procedure Laterality Date    HX BREAST BIOPSY Left 2014    Stereo    HX COLONOSCOPY  2012    HX CYST INCISION AND DRAINAGE Right 04/16/2018    benign    HX KNEE REPLACEMENT Right 2014    HX ROTATOR CUFF REPAIR Right     IR INJ FORAMIN EPID LUMB ANES/STER SNGL  2/20/2019    IR INJ FORAMIN EPID LUMB ANES/STER SNGL  6/21/2019     Family History   Problem Relation Age of Onset    Other Mother         severe hot flashes    Heart Failure Mother 76    Breast Cancer Mother 76    Other Daughter         severe hot flashes    Heart Attack Father 46    Breast Cancer Maternal Aunt      Social History     Tobacco Use    Smoking status: Former Smoker    Smokeless tobacco: Never Used   Substance Use Topics    Alcohol use: Yes     Alcohol/week: 14.0 standard drinks     Types: 8 Glasses of wine, 6 Shots of liquor per week     Comment: occ. Social History     Patient does not qualify to have social determinant information on file (likely too young). Social History Narrative    Lives in [facility], independent living, [facility]    Daughter Anali Shoemaker lives nearby        Review of Systems  Gen: denies fever   Skin: denies rash      Objective:     Vitals:    12/12/19 1620 12/12/19 1648   BP: 150/90 150/78   Pulse: 95    Resp: 18    Temp: 98.2 °F (36.8 °C)    TempSrc: Oral    SpO2: 99%    Weight: 156 lb (70.8 kg)    Height: 5' 4\" (1.626 m)      Body mass index is 26.78 kg/m². General: stated age, well developed, well nourished and in NAD  MSK: little tenderness in right hand 4th/5th fingers PIPs. Enlargement all PIPs but others aren't tender. No tendernes of MCPs. No redness or swelling. Some pain with resisted flexion and extension of right 4th/5th fingers  Neuro: limp with mild leaning to right, uses cane in left hand  Ext:  No edema noted. Skin:  Normal. and no rash or abnormalities   Psych: alert and oriented to person, place, time and situation and Speech: appropriate quality, quantity and organization of sentences      Assessment/Plan:       ICD-10-CM ICD-9-CM    1. Right hand pain M79.641 729.5    2. Essential hypertension I10 401.9    3.  Chronic right-sided low back pain with right-sided sciatica M54.41 724.2 REFERRAL TO PHYSICAL THERAPY    G89.29 724.3      338.29    4. Balance disorder R26.89 781.99 REFERRAL TO PHYSICAL THERAPY   5. Upper back pain, chronic M54.9 724.5 REFERRAL TO PHYSICAL THERAPY    G89.29 338.29    6. Iron deficiency anemia, unspecified iron deficiency anemia type D50.9 280.9    7. Encounter for immunization Z23 V03.89 PNEUMOCOCCAL POLYSACCHARIDE VACCINE, 23-VALENT, ADULT OR IMMUNOSUPPRESSED PT DOSE,      ADMIN PNEUMOCOCCAL VACCINE        Orders Placed This Encounter    PNEUMOCOCCAL POLYSACCHARIDE VACCINE, 23-VALENT, ADULT OR IMMUNOSUPPRESSED PT DOSE,    REFERRAL TO PHYSICAL THERAPY    ADMIN PNEUMOCOCCAL VACCINE       The sudden swelling she describes in right lateral hand that lasted for 10 days may have been gout of the tendon sheaths  The enlargement of PIPs with shooting pains and keeping her from wearing her rings is most likely OA although seems to have developed very suddenly  Discussed doing labs, xrays, she wants to wait until her appt next month and possibly do them then if still bothering her. bp usually good enough at home, no med changes made    Doesn't want further work up anemia but will recheck next visit    Will refer PT as requested for ongoing usual joint pains and feeling more off balance, needing a cane    Discussed the diagnosis and plan and she expressed understanding. Follow-up and Dispositions    · Return in about 6 weeks (around 1/23/2020) for Annual Wellness Visit as scheduled already.          Sanam Harris MD

## 2019-12-12 NOTE — PATIENT INSTRUCTIONS
Hand Arthritis: Exercises Introduction Here are some examples of exercises for you to try. The exercises may be suggested for a condition or for rehabilitation. Start each exercise slowly. Ease off the exercises if you start to have pain. You will be told when to start these exercises and which ones will work best for you. How to do the exercises Tendon glides 1. In this exercise, the steps follow one another to a make a continuous movement. 2. With your affected hand, point your fingers and thumb straight up. Your wrist should be relaxed, following the line of your fingers and thumb. 3. Curl your fingers so that the top two joints in them are bent, and your fingers wrap down. Your fingertips should touch or be near the base of your fingers. Your fingers will look like a hook. 4. Make a fist by bending your knuckles. Your thumb can gently rest against your index (pointing) finger. 5. Unwind your fingers slightly so that your fingertips can touch the base of your palm. Your thumb can rest against your index finger. 6. Move back to your starting position, with your fingers and thumb pointing up. 7. Repeat the series of motions 8 to 12 times. 8. Switch hands and repeat steps 1 through 6, even if only one hand is sore. Intrinsic flexion 1. Rest your affected hand on a table and bend the large joints where your fingers connect to your hand. Keep your thumb and the other joints in your fingers straight. 2. Slowly straighten your fingers. Your wrist should be relaxed, following the line of your fingers and thumb. 3. Move back to your starting position, with your hand bent. 4. Repeat 8 to 12 times. 5. Switch hands and repeat steps 1 through 4, even if only one hand is sore. Finger extension 1. Place your affected hand flat on a table. 2. Lift and then lower one finger at a time off the table. 3. Repeat 8 to 12 times. 4. Switch hands and repeat steps 1 through 3, even if only one hand is sore. MP extension 1. Place your good hand on a table, palm up. Put your affected hand on top of your good hand with your fingers wrapped around the thumb of your good hand like you are making a fist. 
2. Slowly uncurl the joints of your affected hand where your fingers connect to your hand so that only the top two joints of your fingers are bent. Your fingers will look like a hook. 3. Move back to your starting position, with your fingers wrapped around your good thumb. 4. Repeat 8 to 12 times. 5. Switch hands and repeat steps 1 through 4, even if only one hand is sore. PIP extension (with MP extension) 1. Place your good hand on a table, palm up. Put your affected hand on top of your good hand, palm up. 2. Use the thumb and fingers of your good hand to grasp below the middle joint of one finger of your affected hand. 3. Straighten the last two joints of that finger. 4. Repeat 8 to 12 times. 5. Repeat steps 1 through 4 with each finger. 6. Switch hands and repeat steps 1 through 5, even if only one hand is sore. DIP flexion 1. With your good hand, grasp one finger of your affected hand. Your thumb will be on the top side of your finger just below the joint that is closest to your fingernail. 2. Slowly bend your affected finger only at the joint closest to your fingernail. 3. Repeat 8 to 12 times. 4. Repeat steps 1 through 3 with each finger. 5. Switch hands and repeat steps 1 through 4, even if only one hand is sore. Follow-up care is a key part of your treatment and safety. Be sure to make and go to all appointments, and call your doctor if you are having problems. It's also a good idea to know your test results and keep a list of the medicines you take. Where can you learn more? Go to http://dez-cedric.info/. Enter W499 in the search box to learn more about \"Hand Arthritis: Exercises. \" 
 Current as of: June 26, 2019 Content Version: 12.2 © 9473-9690 [employer]. Care instructions adapted under license by [employer] (which disclaims liability or warranty for this information). If you have questions about a medical condition or this instruction, always ask your healthcare professional. [address] any warranty or liability for your use of this information.

## 2019-12-12 NOTE — PROGRESS NOTES
Chief Complaint   Patient presents with    Hand Pain     right hand pain      1. Have you been to the ER, urgent care clinic since your last visit? Hospitalized since your last visit? No    2. Have you seen or consulted any other health care providers outside of the [address] [place] [address] since your last visit? Include any pap smears or colon screening.  No

## 2019-12-20 ENCOUNTER — HOSPITAL ENCOUNTER (OUTPATIENT)
Dept: INTERVENTIONAL RADIOLOGY/VASCULAR | Age: 84
Discharge: HOME | End: 2019-12-20
Attending: FAMILY MEDICINE | Admitting: RADIOLOGY
Payer: MEDICARE

## 2019-12-20 VITALS — WEIGHT: 152 LBS | BODY MASS INDEX: 25.95 KG/M2 | HEIGHT: 64 IN

## 2019-12-20 PROCEDURE — 64483 NJX AA&/STRD TFRM EPI L/S 1: CPT

## 2019-12-20 PROCEDURE — 74011000250 HC RX REV CODE- 250: Performed by: RADIOLOGY

## 2019-12-20 PROCEDURE — 74011250636 HC RX REV CODE- 250/636: Performed by: RADIOLOGY

## 2019-12-20 PROCEDURE — 64484 NJX AA&/STRD TFRM EPI L/S EA: CPT

## 2019-12-20 PROCEDURE — 74011636320 HC RX REV CODE- 636/320: Performed by: RADIOLOGY

## 2019-12-20 RX ORDER — DEXAMETHASONE SODIUM PHOSPHATE 10 MG/ML
10 INJECTION INTRAMUSCULAR; INTRAVENOUS ONCE
Status: COMPLETED | OUTPATIENT
Start: 2019-12-20 | End: 2019-12-20

## 2019-12-20 RX ORDER — LIDOCAINE HYDROCHLORIDE 10 MG/ML
1-10 INJECTION, SOLUTION EPIDURAL; INFILTRATION; INTRACAUDAL; PERINEURAL ONCE
Status: COMPLETED | OUTPATIENT
Start: 2019-12-20 | End: 2019-12-20

## 2019-12-20 RX ADMIN — DEXAMETHASONE SODIUM PHOSPHATE 10 MG: 10 INJECTION, SOLUTION INTRAMUSCULAR; INTRAVENOUS at 11:37

## 2019-12-20 RX ADMIN — IOPAMIDOL 3 ML: 408 INJECTION, SOLUTION INTRATHECAL at 11:37

## 2019-12-20 RX ADMIN — LIDOCAINE HYDROCHLORIDE 6 ML: 10 INJECTION, SOLUTION EPIDURAL; INFILTRATION; INTRACAUDAL; PERINEURAL at 11:37

## 2019-12-20 NOTE — PROGRESS NOTES
1035    Patient arrived. ID and allergies verified verbally with patient. Pt voices understanding of procedure to be performed. Consent obtained. Pt prepped for procedure. Pt denies contrast allergy. Patient denies taking any blood thinners. 1115    TRANSFER - OUT REPORT:    Verbal report given to  Ramy Gama RN (name) on Dimitri Mckeon  being transferred to  [facility] (unit) for routine progression of care       Report consisted of patients Situation, Background, Assessment and   Recommendations(SBAR). Information from the following report(s) SBAR was reviewed with the receiving nurse. Lines:       Opportunity for questions and clarification was provided. Patient transported with:   Tech      1200    Discharge instructions reviewed with patient and family. Voiced understanding. Patient given copy of discharge instructions to take home. 80    Pt discharged via  [address] chair  with family . Personal belongings with patient upon discharge.

## 2019-12-20 NOTE — PROGRESS NOTES
TRANSFER - OUT REPORT:    Verbal report given to ed, rn (name) on An[facility] Hines [facility] post - op       Report consisted of patient's Situation, Background, Assessment and   Recommendations(SBAR). Information from the following report(s) SBAR and Procedure Summary was reviewed with the receiving nurse. Opportunity for questions and clarification was provided.       Patient transported with:   Registered Nurse

## 2019-12-20 NOTE — PROGRESS NOTES
TRANSFER - IN REPORT:    Verbal report received from ED, RN (name) on Erma Carlisle  being received from [facility] (unit) for ordered procedure      Report consisted of patients Situation, Background, Assessment and   Recommendations(SBAR). Information from the following report(s) SBAR was reviewed with the receiving nurse. Opportunity for questions and clarification was provided. Assessment completed upon patients arrival to unit and care assumed.

## 2019-12-30 DIAGNOSIS — B02.22 NEUROPATHIC POSTHERPETIC TRIGEMINAL NEURALGIA: ICD-10-CM

## 2019-12-31 DIAGNOSIS — B02.22 NEUROPATHIC POSTHERPETIC TRIGEMINAL NEURALGIA: ICD-10-CM

## 2019-12-31 RX ORDER — GABAPENTIN 100 MG/1
100 CAPSULE ORAL 2 TIMES DAILY
Qty: 180 CAP | Refills: 1 | Status: SHIPPED | OUTPATIENT
Start: 2019-12-31 | End: 2020-01-05 | Stop reason: SDUPTHER

## 2020-01-05 DIAGNOSIS — B02.22 NEUROPATHIC POSTHERPETIC TRIGEMINAL NEURALGIA: ICD-10-CM

## 2020-01-05 RX ORDER — LISINOPRIL 2.5 MG/1
2.5 TABLET ORAL DAILY
Qty: 30 TAB | Refills: 0 | Status: SHIPPED | OUTPATIENT
Start: 2020-01-05 | End: 2020-01-29

## 2020-01-09 RX ORDER — GABAPENTIN 100 MG/1
200 CAPSULE ORAL 3 TIMES DAILY
Qty: 540 CAP | Refills: 1 | Status: SHIPPED | OUTPATIENT
Start: 2020-01-09 | End: 2020-07-07 | Stop reason: SDUPTHER

## 2020-01-22 NOTE — PATIENT INSTRUCTIONS
Well Visit, Over 72: Care Instructions Your Care Instructions Physical exams can help you stay healthy. Your doctor has checked your overall health and may have suggested ways to take good care of yourself. He or she also may have recommended tests. At home, you can help prevent illness with healthy eating, regular exercise, and other steps. Follow-up care is a key part of your treatment and safety. Be sure to make and go to all appointments, and call your doctor if you are having problems. It's also a good idea to know your test results and keep a list of the medicines you take. How can you care for yourself at home? · Reach and stay at a healthy weight. This will lower your risk for many problems, such as obesity, diabetes, heart disease, and high blood pressure. · Get at least 30 minutes of exercise on most days of the week. Walking is a good choice. You also may want to do other activities, such as running, swimming, cycling, or playing tennis or team sports. · Do not smoke. Smoking can make health problems worse. If you need help quitting, talk to your doctor about stop-smoking programs and medicines. These can increase your chances of quitting for good. · Protect your skin from too much sun. When you're outdoors from 10 a.m. to 4 p.m., stay in the shade or cover up with clothing and a hat with a wide brim. Wear sunglasses that block UV rays. Even when it's cloudy, put broad-spectrum sunscreen (SPF 30 or higher) on any exposed skin. · See a dentist one or two times a year for checkups and to have your teeth cleaned. · Wear a seat belt in the car. Follow your doctor's advice about when to have certain tests. These tests can spot problems early. For men and women · Cholesterol. Your doctor will tell you how often to have this done based on your overall health and other things that can increase your risk for heart attack and stroke. · Blood pressure. Have your blood pressure checked during a routine doctor visit. Your doctor will tell you how often to check your blood pressure based on your age, your blood pressure results, and other factors. · Diabetes. Ask your doctor whether you should have tests for diabetes. · Vision. Experts recommend that you have yearly exams for glaucoma and other age-related eye problems. · Hearing. Tell your doctor if you notice any change in your hearing. You can have tests to find out how well you hear. · Colon cancer tests. Keep having colon cancer tests as your doctor recommends. You can have one of several types of tests. · Heart attack and stroke risk. At least every 4 to 6 years, you should have your risk for heart attack and stroke assessed. Your doctor uses factors such as your age, blood pressure, cholesterol, and whether you smoke or have diabetes to show what your risk for a heart attack or stroke is over the next 10 years. · Osteoporosis. Talk to your doctor about whether you should have a bone density test to find out whether you have thinning bones. Also ask your doctor about whether you should take calcium and vitamin D supplements. For women · Pap test and pelvic exam. You may no longer need a Pap test. Talk with your doctor about whether to stop or continue to have Pap tests. · Breast exam and mammogram. Ask how often you should have a mammogram, which is an X-ray of your breasts. A mammogram can spot breast cancer before it can be felt and when it is easiest to treat. · Thyroid disease. Talk to your doctor about whether to have your thyroid checked as part of a regular physical exam. Women have an increased chance of a thyroid problem. For men · Prostate exam. Talk to your doctor about whether you should have a blood test (called a PSA test) for prostate cancer.  Experts recommend that you discuss the benefits and risks of the test with your doctor before you decide whether to have this test. Some experts say that men ages 79 and older no longer need testing. · Abdominal aortic aneurysm. Ask your doctor whether you should have a test to check for an aneurysm. You may need a test if you ever smoked or if your parent, brother, sister, or child has had an aneurysm. When should you call for help? Watch closely for changes in your health, and be sure to contact your doctor if you have any problems or symptoms that concern you. Where can you learn more? Go to http://dez-cedric.info/. Enter N792 in the search box to learn more about \"Well Visit, Over 65: Care Instructions. \" Current as of: December 13, 2018 Content Version: 12.2 © 9635-7643 [employer]. Care instructions adapted under license by [employer] (which disclaims liability or warranty for this information). If you have questions about a medical condition or this instruction, always ask your healthcare professional. [place] [address] any warranty or liability for your use of this information.

## 2020-01-22 NOTE — PROGRESS NOTES
Jayme Treviño [facility]  [address] [facility] [place], [address]  [phone]                  Date of visit: 1/23/2020        This is an Subsequent Medicare Annual Wellness Visit (AWV), (Performed more than 12 months after effective date of Medicare Part B enrollment and 12 months after last preventive visit)    I have reviewed the patient's medical history in detail and updated the computerized patient record.      Kvng Mitchell is a 80 y.o. female   History obtained from: patient    Concerns today     Lots of pain in right 3rd/4th fingers with arthritis  Hurts at night  -she had an episode of localized pain and swelling in her right hand that has not recurred  -also generally large joints in hands worse in 3 weeks  Doesn't hurt all the time  Stiff for a very long time in am, even now at noon still stiff  Has some in left hand but not nearly as bad  Can't wear rings  Is right-handed  Blue emu just temporary help  Asks about diclofenac gel but she had anaphylaxis to pills so told her no    -chronic low back pain with right sided sciatica, just had another HARSHAD  It did help, will let me know when she wants another  Dr. Dhaval Jones said ok to repeat after 3 months if she wants it  Still on gabapentin    Alcohol intake still about 1-2 drinks per day, has cut back a little  Trying to drink water    I had referred her to PT last month due to pains as well as feeling off balance has just started but he is very hopeful, thinks it will be good    Iron deficiency anemia needs recheck, has not wanted to have endoscopy due to age/risks  Has been taking active iron, once daily, stomach is tolerating it  Not seeing bleeding    -still uses oxybutinin prn for urinary incontinence, like when going out but rarely uses them  Does her exercises    History of rosacea, not in a long time but has been coming back    -blood pressure always ok at home  124/69 last night    Thyroid medication, dose stable   Needs refill to local pharmacy as they won't reflil for 3 months    Still on fosamax, doesn't bother her  Doesn't give her heartburn  Due to recheck dexa as has been about 2 years    History     Patient Active Problem List   Diagnosis Code    Oral lichen planus 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    Balance problem R26.89    Right hip pain M25.551    Chronic foot pain, right M79.671, G89.29    Neuropathic postherpetic trigeminal neuralgia B02.22    Acquired hypothyroidism E03.9    Essential hypertension I10    OAB (overactive bladder) N32.81    Excessive drinking alcohol F10.10    Postmenopausal HRT (hormone replacement therapy) Z79.890    Atopic dermatitis L20.9    Chronic left shoulder pain M25.512, G89.29    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    Osteopenia of multiple sites M85.89    Chronic renal disease, stage 3, moderately decreased glomerular filtration rate between 30-59 mL/min/1.73 square meter (HCC) N18.3    Chronic right-sided low back pain with right-sided sciatica M54.41, G89.29    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.062    Foraminal stenosis of lumbar region M48.061    Allergic rhinoconjunctivitis, seasonal and perennial J30.2, H10.10, J30.89    Iron deficiency anemia D50.9     Past Medical History:   Diagnosis Date    Arthritis     Eczema     History of hormone replacement therapy 1987    stopped    Hypertension 7926    Lichen planus     Oral    Menopause 1987    Trigeminal neuralgia       Past Surgical History:   Procedure Laterality Date    HX BREAST BIOPSY Left 2014    Stereo    HX COLONOSCOPY  2012    HX CYST INCISION AND DRAINAGE Right 04/16/2018    benign    HX KNEE REPLACEMENT Right 2014    HX ROTATOR CUFF REPAIR Right     IR INJ FORAMIN EPID LUMB ANES/STER SNGL  2/20/2019    IR INJ FORAMIN EPID LUMB ANES/STER SNGL  6/21/2019    IR INJ FORAMIN EPID LUMB ANES/STER SNGL  12/20/2019     Allergies   Allergen Reactions    Voltaren [Diclofenac Sodium] Anaphylaxis    [place] Other (comments)    Iron Other (comments)     GI UPSET AND DIZZINESS     Lyrica [Pregabalin] Drowsiness    Neosporin [Benzalkonium Chloride] Rash     Current Outpatient Medications   Medication Sig Dispense Refill    mv,Ca,min-iron-FA-[facility]ana[facility] ACTIVE) 18 mg iron- 400 mcg-180 mg tab Take  by mouth.  fexofenadine (ALLEGRA) 180 mg tablet Take 1 Tab by mouth nightly.  levothyroxine (SYNTHROID) 50 mcg tablet Take 1 Tab by mouth daily. 90 Tab 0    metroNIDAZOLE (METROGEL) 1 % topical gel Apply  to affected area daily. Use a thin layer to affected areas after washing for rosacea 45 g 3    gabapentin (NEURONTIN) 100 mg capsule Take 2 Caps by mouth three (3) times daily. Max Daily Amount: 600 mg. Fax Anjali [phone] Cap 1    lisinopril (PRINIVIL, ZESTRIL) 2.5 mg tablet Take 1 Tab by mouth daily. 30 Tab 0    fluocinonide (LIDEX) 0.05 % topical gel Apply  to affected area two (2) times daily as needed (eczema). 30 g 1    alendronate (FOSAMAX) 70 mg tablet Take 1 Tab by mouth every seven (7) days. 13 Tab 3    oxybutynin (DITROPAN) 5 mg tablet Take 1 Tab by mouth daily as needed. 90 Tab 1    doxycycline (ADOXA) 100 mg tablet Take 100 mg by mouth two (2) times a day. AS NEEDED      CALCIUM CARB/VIT D2/MINERALS (CALTRATE PLUS PO) Take 500 mg by mouth daily. Family History   Problem Relation Age of Onset    Other Mother         severe hot flashes    Heart Failure Mother 76    Breast Cancer Mother 76    Other Daughter         severe hot flashes    Heart Attack Father 46    Breast Cancer Maternal Aunt      Social History     Tobacco Use    Smoking status: Former Smoker    Smokeless tobacco: Never Used   Substance Use Topics    Alcohol use: Yes     Alcohol/week: 14.0 standard drinks     Types: 8 Glasses of wine, 6 Shots of liquor per week     Comment: occ. Specialists/Care Team   Julissa Del Cid.  David December has established care with the following healthcare providers:  Patient Care Team:  Ellen Sarkar MD as PCP - General (Family Practice)  Ellen Sarkar MD as PCP - [facility] Empaneled Provider  Tanisha Adams MD (Ophthalmology)  Dentist and eye doctor, physical therapist    Health Risk Assessment     Demographics   female  80 y.o. General Health Questions   -During the past 4 weeks:   -how would you rate your health in general? Good   -how often have you been bothered by feeling dizzy when standing up? no   -how much have you been bothered by bodily pain? Yes, as above   -Have you noticed any hearing difficulties? Jaylin Ovens with her hearing aids   -has your physical and emotional health limited your social activities with family or friends? no    Emotional Health Questions   -Do you have a history of depression, anxiety, or emotional problems? no    3 most recent PHQ Screens 1/23/2020   Little interest or pleasure in doing things Not at all   Feeling down, depressed, irritable, or hopeless Not at all   Total Score PHQ 2 0     Health Habits   Please describe your diet habits: eats cheese, egg normal, plenty of veggies, eats well, appetite not great  Do you get 5 servings of fruits or vegetables daily? yes  Do you exercise regularly? Rides bike 3x per week for 30 minutes, does 30 min of PT exercises     Activities of Daily Living and Functional Status   -Do you need help with eating, walking, dressing, bathing, toileting, the phone, transportation, shopping, preparing meals, housework, laundry, medications or managing money? no  -In the past four weeks, was someone available to help you if you needed and wanted help with anything? Yes, daughter  -Are you confident are you that you can control and manage most of your health problems? yes  -Have you been given information to help you keep track of your medications? yes  -How often do you have trouble taking your medications as prescribed? never    Fall Risk and Home Safety   Have you fallen 2 or more times in the past year?  no  Does your home have rugs in the hallway, lack grab bars in the bathroom, lack handrails on the stairs or have poor lighting? no  Do you have smoke detectors and check them regularly? yes  Do you have difficulties driving a car? No, just goes short distances   Do you always fasten your seat belt when you are in a car? yes    Review of Systems (if indicated for problems addressed today)   Card: denies chest pain  GI denies constipation or bleeding    Physical Examination     Vitals:    01/23/20 1137 01/23/20 1224   BP: 170/84 160/70   Pulse: 62    Resp: 17    Temp: 98.7 °F (37.1 °C)    TempSrc: Oral    SpO2: 99%    Weight: 155 lb 3.2 oz (70.4 kg)    Height: 5' 4\" (1.626 m)      Body mass index is 26.64 kg/m². No exam data present  Was the patient's timed Up & Go test unsteady or longer than 30 seconds? no    Evaluation of Cognitive Function   Mood/affect:  happy  Orientation: normal  Appearance: age appropriate  Family member/caregiver input: none    Additional exam if indicated for problems addressed today:  General: stated age, well developed, well nourished and in NAD  Neck: supple, symmetrical, trachea midline, no adenopathy and thyroid: not enlarged, symmetric, no tenderness/mass/nodules  Lungs:  clear to auscultation w/o rales, rhonchi, wheezes w/normal effort and no use of accessory muscles of respiration   Heart: regular rate and rhythm, S1, S2 normal, no murmur, click, rub or gallop  Abdomen: soft, nontender, no masses  Ext:  No edema noted.    MSK: right hand tenderness and mild swelling at 3rd/4th fingers PIP joints, MCPs slightly tender but not swollen, no swelling of hand/wrist  Lymph: no cervical adenopathy appreciated  Skin:  Slight erythema cheeks and nose  Psych: alert and oriented to person, place, time and situation and Speech: appropriate quality, quantity and organization of sentences      Advice/Referrals/Counseling (as indicated)   Education and counseling provided for any problems identified above: diet    Preventive Services     Health Maintenance   Topic Date Due    MEDICARE YEARLY EXAM  01/23/2021    GLAUCOMA SCREENING Q2Y  11/06/2021    DTaP/Tdap/Td series (2 - Td) 07/19/2028    Bone Densitometry (Dexa) Screening  Completed    Shingrix Vaccine Age 50>  Completed    Influenza Age 5 to Adult  Completed    Pneumococcal 65+ years  Completed      (Preventive care checklist to be included in patient instructions)  Discussed today Done Previously Not Needed     X both  Pneumococcal vaccines    x  Flu vaccine     x Hepatitis B vaccine (if at risk)    X both  Shingles vaccine    x  TDAP vaccine    x  Mammogram     x Pap smear     x Colorectal cancer screening     x Low-dose CT for lung cancer screening   X refer 2/18 started fosamax  Bone density test    X 11/19  Glaucoma screening    x  Cholesterol test    x  Diabetes screening test      x Diabetes self-management class     x Nutritionist referral for diabetes or renal disease     Discussion of Advance Directive   Discussed with Sondra Jones her ability to prepare and advance directive in the case that an injury or illness causes her to be unable to make health care decisions. Has one, brought it in today; will scan    Assessment/Plan   Z00.00    ICD-10-CM ICD-9-CM    1. Medicare annual wellness visit, subsequent Z00.00 V70.0    2. Right hand pain M79.641 729.5 XR HAND RT MIN 3 V   3. Rosacea L71.9 695.3    4. Iron deficiency anemia, unspecified iron deficiency anemia type D50.9 280.9 CBC WITH AUTOMATED DIFF      IRON PROFILE   5. Essential hypertension T67 438.9 METABOLIC PANEL, BASIC   6. Chronic right-sided low back pain with right-sided sciatica M54.41 724.2     G89.29 724.3      338.29    7. Balance disorder R26.89 781.99    8. Upper back pain, chronic M54.9 724.5     G89.29 338.29    9.  Chronic renal disease, stage 3, moderately decreased glomerular filtration rate between 30-59 mL/min/1.73 square meter (HCC) N18.3 585.3    10. 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.062 724.03    11. OAB (overactive bladder) N32.81 596.51    12. Acquired hypothyroidism E03.9 244.9    13. Inflammatory arthritis M19.90 714.9 C REACTIVE PROTEIN, QT      SED RATE (ESR)      RHEUMATOID FACTOR, QL      CCP ANTIBODIES IGG/IGA   14. Estrogen deficiency E28.39 256.39 DEXA BONE DENSITY STUDY AXIAL       Orders Placed This Encounter    DEXA BONE DENSITY STUDY AXIAL    XR HAND RT MIN 3 V    CBC WITH AUTOMATED DIFF    IRON PROFILE    METABOLIC PANEL, BASIC    C REACTIVE PROTEIN, QT    SED RATE (ESR)    RHEUMATOID FACTOR, QL    CCP ANTIBODIES IGG/IGA    mv,Ca,min-iron-FA-guarana-caff (WOMEN'S ACTIVE) 18 mg iron- 400 mcg-180 mg tab    fexofenadine (ALLEGRA) 180 mg tablet    levothyroxine (SYNTHROID) 50 mcg tablet    metroNIDAZOLE (METROGEL) 1 % topical gel     Preventive as above  Chronic problems pretty stable  The right hand pain is likely OA but has hit her recently and quite severely  Will xray and do albs as above  No more of the swelling she had once that was possibly gout    Refill metrogel that worked before for her rosacea  Has cut back on etoh, this was encouraged    In PT for the off balance feeling and her back but started recently  The HARSHAD are really helping her back, plans to continue every 3 mo if needed    OAB bothers her some but only rarely uses her med    Thyroid stable    Recheck anemia, she found an iron pill she can tolerate but only started it 2 weeks ago      Follow-up and Dispositions    · Return in about 6 months (around 7/23/2020) for Follow up.          Reina Robles MD

## 2020-01-23 ENCOUNTER — OFFICE VISIT (OUTPATIENT)
Dept: FAMILY MEDICINE CLINIC | Age: 85
End: 2020-01-23

## 2020-01-23 ENCOUNTER — HOSPITAL ENCOUNTER (OUTPATIENT)
Dept: LAB | Age: 85
Discharge: HOME | End: 2020-01-23
Payer: MEDICARE

## 2020-01-23 VITALS
WEIGHT: 155.2 LBS | DIASTOLIC BLOOD PRESSURE: 70 MMHG | OXYGEN SATURATION: 99 % | SYSTOLIC BLOOD PRESSURE: 160 MMHG | RESPIRATION RATE: 17 BRPM | HEIGHT: 64 IN | TEMPERATURE: 98.7 F | BODY MASS INDEX: 26.5 KG/M2 | HEART RATE: 62 BPM

## 2020-01-23 DIAGNOSIS — R26.89 BALANCE DISORDER: ICD-10-CM

## 2020-01-23 DIAGNOSIS — N18.30 CHRONIC RENAL DISEASE, STAGE 3, MODERATELY DECREASED GLOMERULAR FILTRATION RATE BETWEEN 30-59 ML/MIN/1.73 SQUARE METER (HCC): ICD-10-CM

## 2020-01-23 DIAGNOSIS — L71.9 ROSACEA: ICD-10-CM

## 2020-01-23 DIAGNOSIS — E03.9 ACQUIRED HYPOTHYROIDISM: ICD-10-CM

## 2020-01-23 DIAGNOSIS — M19.90 INFLAMMATORY ARTHRITIS: ICD-10-CM

## 2020-01-23 DIAGNOSIS — G89.29 CHRONIC RIGHT-SIDED LOW BACK PAIN WITH RIGHT-SIDED SCIATICA: ICD-10-CM

## 2020-01-23 DIAGNOSIS — G89.29 UPPER BACK PAIN, CHRONIC: ICD-10-CM

## 2020-01-23 DIAGNOSIS — D50.9 IRON DEFICIENCY ANEMIA, UNSPECIFIED IRON DEFICIENCY ANEMIA TYPE: ICD-10-CM

## 2020-01-23 DIAGNOSIS — M54.9 UPPER BACK PAIN, CHRONIC: ICD-10-CM

## 2020-01-23 DIAGNOSIS — I10 ESSENTIAL HYPERTENSION: ICD-10-CM

## 2020-01-23 DIAGNOSIS — Z00.00 MEDICARE ANNUAL WELLNESS VISIT, SUBSEQUENT: Primary | ICD-10-CM

## 2020-01-23 DIAGNOSIS — M79.641 RIGHT HAND PAIN: ICD-10-CM

## 2020-01-23 DIAGNOSIS — M48.062 SPINAL STENOSIS OF LUMBAR REGION WITH NEUROGENIC CLAUDICATION: ICD-10-CM

## 2020-01-23 DIAGNOSIS — E28.39 ESTROGEN DEFICIENCY: ICD-10-CM

## 2020-01-23 DIAGNOSIS — N32.81 OAB (OVERACTIVE BLADDER): ICD-10-CM

## 2020-01-23 DIAGNOSIS — M54.41 CHRONIC RIGHT-SIDED LOW BACK PAIN WITH RIGHT-SIDED SCIATICA: ICD-10-CM

## 2020-01-23 PROCEDURE — 83550 IRON BINDING TEST: CPT

## 2020-01-23 PROCEDURE — 86200 CCP ANTIBODY: CPT

## 2020-01-23 PROCEDURE — 86431 RHEUMATOID FACTOR QUANT: CPT

## 2020-01-23 PROCEDURE — 86140 C-REACTIVE PROTEIN: CPT

## 2020-01-23 PROCEDURE — 85651 RBC SED RATE NONAUTOMATED: CPT

## 2020-01-23 PROCEDURE — 36415 COLL VENOUS BLD VENIPUNCTURE: CPT

## 2020-01-23 PROCEDURE — 85025 COMPLETE CBC W/AUTO DIFF WBC: CPT

## 2020-01-23 PROCEDURE — 80048 BASIC METABOLIC PNL TOTAL CA: CPT

## 2020-01-23 RX ORDER — LEVOTHYROXINE SODIUM 50 UG/1
50 TABLET ORAL DAILY
Qty: 90 TAB | Refills: 0 | Status: SHIPPED | OUTPATIENT
Start: 2020-01-23 | End: 2020-04-18

## 2020-01-23 RX ORDER — METRONIDAZOLE 10 MG/G
GEL TOPICAL DAILY
Qty: 45 G | Refills: 3 | Status: SHIPPED | OUTPATIENT
Start: 2020-01-23 | End: 2021-04-08

## 2020-01-23 RX ORDER — MINERAL OIL
180 ENEMA (ML) RECTAL
COMMUNITY
Start: 2020-01-23 | End: 2022-02-14 | Stop reason: ALTCHOICE

## 2020-01-23 NOTE — PROGRESS NOTES
Chief Complaint   Patient presents with   Sharon Annual Wellness Visit       1. Have you been to the ER, urgent care clinic since your last visit? Hospitalized since your last visit? 2. Have you seen or consulted any other health care providers outside of the [address] [place] [address] since your last visit? Include any pap smears or colon screening.

## 2020-01-24 LAB
BASOPHILS # BLD AUTO: 0.1 X10E3/UL (ref 0–0.2)
BASOPHILS NFR BLD AUTO: 1 %
BUN SERPL-MCNC: 22 MG/DL (ref 10–36)
BUN/CREAT SERPL: 22 (ref 12–28)
CALCIUM SERPL-MCNC: 9.7 MG/DL (ref 8.7–10.3)
CCP IGA+IGG SERPL IA-ACNC: 7 UNITS (ref 0–19)
CHLORIDE SERPL-SCNC: 102 MMOL/L (ref 96–106)
CO2 SERPL-SCNC: 22 MMOL/L (ref 20–29)
CREAT SERPL-MCNC: 1 MG/DL (ref 0.57–1)
CRP SERPL-MCNC: 4 MG/L (ref 0–10)
EOSINOPHIL # BLD AUTO: 0.1 X10E3/UL (ref 0–0.4)
EOSINOPHIL NFR BLD AUTO: 2 %
ERYTHROCYTE [DISTWIDTH] IN BLOOD BY AUTOMATED COUNT: 21.3 % (ref 11.7–15.4)
ERYTHROCYTE [SEDIMENTATION RATE] IN BLOOD BY WESTERGREN METHOD: 21 MM/HR (ref 0–40)
GLUCOSE SERPL-MCNC: 87 MG/DL (ref 65–99)
HCT VFR BLD AUTO: 42.1 % (ref 34–46.6)
HGB BLD-MCNC: 13 G/DL (ref 11.1–15.9)
IMM GRANULOCYTES # BLD AUTO: 0 X10E3/UL (ref 0–0.1)
IMM GRANULOCYTES NFR BLD AUTO: 0 %
INTERPRETATION: NORMAL
IRON SATN MFR SERPL: 16 % (ref 15–55)
IRON SERPL-MCNC: 55 UG/DL (ref 27–139)
LYMPHOCYTES # BLD AUTO: 1.2 X10E3/UL (ref 0.7–3.1)
LYMPHOCYTES NFR BLD AUTO: 21 %
MCH RBC QN AUTO: 25.1 PG (ref 26.6–33)
MCHC RBC AUTO-ENTMCNC: 30.9 G/DL (ref 31.5–35.7)
MCV RBC AUTO: 81 FL (ref 79–97)
MONOCYTES # BLD AUTO: 0.6 X10E3/UL (ref 0.1–0.9)
MONOCYTES NFR BLD AUTO: 10 %
NEUTROPHILS # BLD AUTO: 3.8 X10E3/UL (ref 1.4–7)
NEUTROPHILS NFR BLD AUTO: 66 %
PLATELET # BLD AUTO: 334 X10E3/UL (ref 150–450)
POTASSIUM SERPL-SCNC: 4.8 MMOL/L (ref 3.5–5.2)
RBC # BLD AUTO: 5.17 X10E6/UL (ref 3.77–5.28)
RHEUMATOID FACT SERPL-ACNC: 10.7 IU/ML (ref 0–13.9)
SODIUM SERPL-SCNC: 140 MMOL/L (ref 134–144)
TIBC SERPL-MCNC: 352 UG/DL (ref 250–450)
UIBC SERPL-MCNC: 297 UG/DL (ref 118–369)
WBC # BLD AUTO: 5.7 X10E3/UL (ref 3.4–10.8)

## 2020-02-06 ENCOUNTER — HOSPITAL ENCOUNTER (OUTPATIENT)
Dept: MAMMOGRAPHY | Age: 85
Discharge: HOME | End: 2020-02-06
Payer: MEDICARE

## 2020-02-06 ENCOUNTER — HOSPITAL ENCOUNTER (OUTPATIENT)
Dept: GENERAL RADIOLOGY | Age: 85
Discharge: HOME | End: 2020-02-06
Payer: MEDICARE

## 2020-02-06 DIAGNOSIS — M79.641 RIGHT HAND PAIN: ICD-10-CM

## 2020-02-06 DIAGNOSIS — E28.39 ESTROGEN DEFICIENCY: ICD-10-CM

## 2020-02-06 PROCEDURE — 77080 DXA BONE DENSITY AXIAL: CPT

## 2020-02-06 PROCEDURE — 73130 X-RAY EXAM OF HAND: CPT

## 2020-03-11 ENCOUNTER — TELEPHONE (OUTPATIENT)
Dept: FAMILY MEDICINE CLINIC | Age: 85
End: 2020-03-11

## 2020-03-11 DIAGNOSIS — M19.042 PRIMARY OSTEOARTHRITIS OF BOTH HANDS: Primary | ICD-10-CM

## 2020-03-11 DIAGNOSIS — M79.642 BILATERAL HAND PAIN: ICD-10-CM

## 2020-03-11 DIAGNOSIS — M79.641 BILATERAL HAND PAIN: ICD-10-CM

## 2020-03-11 DIAGNOSIS — M19.041 PRIMARY OSTEOARTHRITIS OF BOTH HANDS: Primary | ICD-10-CM

## 2020-03-11 NOTE — TELEPHONE ENCOUNTER
----- Message from Taylor Jones sent at 3/11/2020  1:16 PM EDT -----  Regarding: Referral Request  Contact: [phone]  Arthritis in fingers painful and have continued wet heat and exercises. My daughter is suggesting I go to a therapist.  [facility],  where I go now for balance, has a hand therapist in another location. I have 6 more sessions about my balance. Is it possible to have another RX for hand. Thank you for all your help and concern. Jitendra Baird    Spoke to pt ans she said that she had cancelled all appts.   She will get back with me when she decides to go back

## 2020-03-24 RX ORDER — AMOXICILLIN AND CLAVULANATE POTASSIUM 875; 125 MG/1; MG/1
1 TABLET, FILM COATED ORAL EVERY 12 HOURS
Qty: 14 TAB | Refills: 0 | Status: SHIPPED | OUTPATIENT
Start: 2020-03-24 | End: 2020-03-31

## 2020-03-31 ENCOUNTER — TELEPHONE (OUTPATIENT)
Dept: FAMILY MEDICINE CLINIC | Age: 85
End: 2020-03-31

## 2020-03-31 RX ORDER — NYSTATIN 100000 [USP'U]/ML
1 SUSPENSION ORAL 4 TIMES DAILY
Qty: 473 ML | Refills: 1 | Status: SHIPPED | OUTPATIENT
Start: 2020-03-31 | End: 2020-04-30

## 2020-03-31 NOTE — TELEPHONE ENCOUNTER
Pt unable to send me picture of mouth; have been comunicating in portal about sore throat after recent antibiotic, suspected thrush.  Took a quick look in our parking lot and her tongue is coated with white/yellow plaque, she says that is unusual for her  wlil send in nystatin; she will keep me posted on the portal

## 2020-04-05 RX ORDER — LIDOCAINE HYDROCHLORIDE 20 MG/ML
15 SOLUTION OROPHARYNGEAL
Qty: 1 BOTTLE | Refills: 3 | Status: SHIPPED | OUTPATIENT
Start: 2020-04-05 | End: 2021-04-08

## 2020-04-18 RX ORDER — LEVOTHYROXINE SODIUM 50 UG/1
TABLET ORAL
Qty: 90 TAB | Refills: 0 | Status: SHIPPED | OUTPATIENT
Start: 2020-04-18 | End: 2020-07-04 | Stop reason: SDUPTHER

## 2020-04-28 ENCOUNTER — PATIENT MESSAGE (OUTPATIENT)
Dept: FAMILY MEDICINE CLINIC | Age: 85
End: 2020-04-28

## 2020-04-28 RX ORDER — ACYCLOVIR 800 MG/1
800 TABLET ORAL 2 TIMES DAILY
Qty: 180 TAB | Refills: 1 | Status: SHIPPED | OUTPATIENT
Start: 2020-04-28 | End: 2020-05-09 | Stop reason: SDUPTHER

## 2020-05-09 RX ORDER — ACYCLOVIR 800 MG/1
800 TABLET ORAL 2 TIMES DAILY
Qty: 180 TAB | Refills: 1 | Status: SHIPPED | OUTPATIENT
Start: 2020-05-09 | End: 2021-04-08

## 2020-06-03 ENCOUNTER — HOSPITAL ENCOUNTER (OUTPATIENT)
Dept: MAMMOGRAPHY | Age: 85
Discharge: HOME | End: 2020-06-03
Attending: FAMILY MEDICINE
Payer: MEDICARE

## 2020-06-03 DIAGNOSIS — Z12.31 ENCOUNTER FOR MAMMOGRAM TO ESTABLISH BASELINE MAMMOGRAM: ICD-10-CM

## 2020-06-03 DIAGNOSIS — Z12.31 OTHER SCREENING MAMMOGRAM: ICD-10-CM

## 2020-06-03 PROCEDURE — 77063 BREAST TOMOSYNTHESIS BI: CPT

## 2020-07-04 DIAGNOSIS — B02.22 NEUROPATHIC POSTHERPETIC TRIGEMINAL NEURALGIA: ICD-10-CM

## 2020-07-04 RX ORDER — LEVOTHYROXINE SODIUM 50 UG/1
TABLET ORAL
Qty: 90 TAB | Refills: 1 | Status: SHIPPED | OUTPATIENT
Start: 2020-07-04 | End: 2021-02-16 | Stop reason: SDUPTHER

## 2020-07-07 DIAGNOSIS — B02.22 NEUROPATHIC POSTHERPETIC TRIGEMINAL NEURALGIA: ICD-10-CM

## 2020-07-07 RX ORDER — GABAPENTIN 100 MG/1
200 CAPSULE ORAL 3 TIMES DAILY
Qty: 540 CAP | Refills: 1 | Status: SHIPPED | OUTPATIENT
Start: 2020-07-07 | End: 2020-07-21 | Stop reason: SDUPTHER

## 2020-07-18 DIAGNOSIS — B02.22 NEUROPATHIC POSTHERPETIC TRIGEMINAL NEURALGIA: ICD-10-CM

## 2020-07-21 DIAGNOSIS — B02.22 NEUROPATHIC POSTHERPETIC TRIGEMINAL NEURALGIA: ICD-10-CM

## 2020-07-21 RX ORDER — GABAPENTIN 100 MG/1
200 CAPSULE ORAL 3 TIMES DAILY
Qty: 540 CAP | Refills: 1 | Status: SHIPPED | OUTPATIENT
Start: 2020-07-21 | End: 2020-12-29 | Stop reason: SDUPTHER

## 2020-07-22 NOTE — PROGRESS NOTES
Jayme Treviño [facility]. [place], [address]  [phone]             Date of visit: 7/23/2020   Subjective:      History obtained from:  the patient.   Chaparrita Bruce is a 80 y.o. female who presents today for   Chief Complaint   Patient presents with    Follow-up     6 month f/u     Injection     pt would like a cortisone injection in RT shoulder      Right shoulder bothering her again and wants an injection  Lifting things into cabinet helps  Has already said she would not want surgery  Had PT eval and will go for 3rd time today[facility]  Has had left one injected 4x, right one injeted once   Left shoulder x-ray has arthritis, right one not done   Tweaked left one yesterday but thinks PT will help that    On gabapentin for trigeminal neuralgia   Is tolerable    Back in physical therapy for just her shoulders at this time  Has done it for her back in the past    Took a long time for thrush to go away but finally did  Couldn't tolerate the liquid taste  Dentist gave her pills that cleared it up in 2 prescriptions    HTN and chronic renal failure on lisinopril 2.5mg  Blood pressure at home up and down but still good    -chronic low back pain with right sided sciatica, gets HARSHAD with Dr. Chanel Costa  Hasn't done that injection in a while    Hand arthritis doing much better  Went to Dr. Mindy Cai ortho  He did a copule of finger injetions and did therapist and got some special ring braces to straighten them back out and some other braces for night    Stable thyroid dose but due to check TSH    On flonase and allegra for allergies prn and they work when needed    On ditropan for OAB just uses once in a while    Patient Active Problem List    Diagnosis Date Noted    Iron deficiency anemia 12/12/2019    Allergic rhinoconjunctivitis, seasonal and perennial 09/05/2019    Chronic right-sided low back pain with right-sided sciatica 02/06/2019    Spinal stenosis of lumbar region with neurogenic claudication 02/06/2019    Foraminal stenosis of lumbar region 02/06/2019    Chronic renal disease, stage 3, moderately decreased glomerular filtration rate between 30-59 mL/min/1.73 square meter ([facility] [address] 07/19/2018    Osteopenia of multiple sites 02/02/2018    Atopic dermatitis 01/16/2018    Chronic left shoulder pain 01/16/2018    Female pattern hair loss 01/16/2018    OAB (overactive bladder) 11/29/2017    Excessive drinking alcohol 11/29/2017    Postmenopausal HRT (hormone replacement therapy) 45/17/6361    Oral lichen planus 21/07/3621    Balance problem 11/28/2017    Right hip pain 11/28/2017    Chronic foot pain, right 11/28/2017    Neuropathic postherpetic trigeminal neuralgia 11/28/2017    Acquired hypothyroidism 11/28/2017    Essential hypertension 11/28/2017     Current Outpatient Medications   Medication Sig Dispense Refill    gabapentin (NEURONTIN) 100 mg capsule Take 2 Caps by mouth three (3) times daily. Max Daily Amount: 600 mg. Fax ArnieVA [phone] Cap 1    levothyroxine (SYNTHROID) 50 mcg tablet TAKE 1 TABLET BY MOUTH EVERY DAY 90 Tab 1    lidocaine (XYLOCAINE) 2 % solution Take 15 mL by mouth every three (3) hours as needed for Pain. Swish and spit 1 Bottle 3    lisinopril (PRINIVIL, ZESTRIL) 2.5 mg tablet TAKE 1 TABLET BY MOUTH EVERY DAY 90 Tab 3    fexofenadine (ALLEGRA) 180 mg tablet Take 1 Tab by mouth nightly.  metroNIDAZOLE (METROGEL) 1 % topical gel Apply  to affected area daily. Use a thin layer to affected areas after washing for rosacea 45 g 3    fluocinonide (LIDEX) 0.05 % topical gel Apply  to affected area two (2) times daily as needed (eczema). 30 g 1    alendronate (FOSAMAX) 70 mg tablet Take 1 Tab by mouth every seven (7) days. 13 Tab 3    oxybutynin (DITROPAN) 5 mg tablet Take 1 Tab by mouth daily as needed. 90 Tab 1    doxycycline (ADOXA) 100 mg tablet Take 100 mg by mouth two (2) times a day.  AS NEEDED  CALCIUM CARB/VIT D2/MINERALS (CALTRATE PLUS PO) Take 500 mg by mouth daily.  acyclovir (ZOVIRAX) 800 mg tablet Take 1 Tab by mouth two (2) times a day. 180 Tab 1    mv,Ca,min-iron-FA-guarana-caff [facility] ACTIVE) 18 mg iron- 400 mcg-180 mg tab Take  by mouth. Allergies   Allergen Reactions    Voltaren [Diclofenac Sodium] Anaphylaxis    [place] Other (comments)    Iron Other (comments)     GI UPSET AND DIZZINESS     Lyrica [Pregabalin] Drowsiness    Neosporin [Benzalkonium Chloride] Rash    Sulfa (Sulfonamide Antibiotics) Other (comments)     Past Medical History:   Diagnosis Date    Arthritis     Bowel disease     Eczema     History of hormone replacement therapy 1987    stopped    Hypertension 0771    Lichen planus     Oral    Menopause 1987    Trigeminal neuralgia      Past Surgical History:   Procedure Laterality Date    HX BREAST BIOPSY Left 2014    Stereo    HX COLONOSCOPY  2012    HX CYST INCISION AND DRAINAGE Right 04/16/2018    benign    HX KNEE REPLACEMENT Right 2014    HX ROTATOR CUFF REPAIR Right 2001    IR INJ FORAMIN EPID LUMB ANES/STER SNGL  2/20/2019    IR INJ FORAMIN EPID LUMB ANES/STER SNGL  6/21/2019    IR INJ FORAMIN EPID LUMB ANES/STER SNGL  12/20/2019     Family History   Problem Relation Age of Onset    Other Mother         severe hot flashes    Heart Failure Mother 76    Breast Cancer Mother 76    Other Daughter         severe hot flashes    Heart Attack Father 46    Breast Cancer Maternal Aunt      Social History     Tobacco Use    Smoking status: Former Smoker    Smokeless tobacco: Never Used   Substance Use Topics    Alcohol use: Yes     Alcohol/week: 14.0 standard drinks     Types: 8 Glasses of wine, 6 Shots of liquor per week     Comment: occ.       Social History     Social History Narrative    Lives in [facility], independent living, [facility]    Daughter Shira Hogan lives nearby        Review of Systems  Gen: denies fever  pulm denies cough     Objective:     Vitals:    07/23/20 1114 07/23/20 1140   BP: 155/85 148/86   Pulse: 83    Resp: 16    Temp: 98 °F (36.7 °C)    TempSrc: Oral    SpO2: 98%    Weight: 149 lb 6.4 oz (67.8 kg)    Height: 5' 4\" (1.626 m)      Body mass index is 25.64 kg/m². General: stated age, well developed, well nourished and in NAD  Neck: supple, symmetrical, trachea midline, no adenopathy and thyroid: not enlarged, symmetric, no tenderness/mass/nodules  Lungs:  clear to auscultation w/o rales, rhonchi, wheezes w/normal effort and no use of accessory muscles of respiration   Heart: regular rate and rhythm, S1, S2 normal, no murmur, click, rub or gallop  MSK: both shoulders a little tender. Left one 90 deg abduction (active or passive) and right one normal ROM  Ext:  No edema noted. Lymph: no cervical adenopathy appreciated  Skin:  Normal. and no rash or abnormalities   Psych: alert and oriented to person, place, time and situation and Speech: appropriate quality, quantity and organization of sentences    Assessment/Plan:       ICD-10-CM ICD-9-CM    1. Chronic renal disease, stage 3, moderately decreased glomerular filtration rate between 30-59 mL/min/1.73 square meter (HCC)  N18.3 585.3    2. Acquired hypothyroidism  E03.9 244.9 TSH 3RD GENERATION   3. Chronic right-sided low back pain with right-sided sciatica  M54.41 724.2     G89.29 724.3      338.29    4. Essential hypertension  A70 717.4 METABOLIC PANEL, COMPREHENSIVE      LIPID PANEL      CBC WITH AUTOMATED DIFF   5. Neuropathic postherpetic trigeminal neuralgia  B02.22 053.12    6. OAB (overactive bladder)  N32.81 596.51    7.  Osteopenia of multiple sites  M85.89 733.90         Orders Placed This Encounter    METABOLIC PANEL, COMPREHENSIVE    LIPID PANEL    TSH 3RD GENERATION    CBC WITH AUTOMATED DIFF       Chronic problems stable for the most part  Shoulders bothering her but already improving with physical therapy so advised holding off on injections  Routine labs for chronic problems as above  No med changes    Discussed the diagnosis and plan and she expressed understanding. F/u Dion AWTORIBIO  Follow-up and Dispositions    · Return in about 6 months (around 1/23/2021) for Annual Wellness Visit, Follow up.          Emilee iRchards MD

## 2020-07-23 ENCOUNTER — OFFICE VISIT (OUTPATIENT)
Dept: FAMILY MEDICINE CLINIC | Age: 85
End: 2020-07-23

## 2020-07-23 ENCOUNTER — HOSPITAL ENCOUNTER (OUTPATIENT)
Dept: LAB | Age: 85
Discharge: HOME | End: 2020-07-23
Payer: MEDICARE

## 2020-07-23 VITALS
TEMPERATURE: 98 F | SYSTOLIC BLOOD PRESSURE: 148 MMHG | HEART RATE: 83 BPM | WEIGHT: 149.4 LBS | RESPIRATION RATE: 16 BRPM | OXYGEN SATURATION: 98 % | DIASTOLIC BLOOD PRESSURE: 86 MMHG | HEIGHT: 64 IN | BODY MASS INDEX: 25.51 KG/M2

## 2020-07-23 DIAGNOSIS — N32.81 OAB (OVERACTIVE BLADDER): ICD-10-CM

## 2020-07-23 DIAGNOSIS — E03.9 ACQUIRED HYPOTHYROIDISM: ICD-10-CM

## 2020-07-23 DIAGNOSIS — N18.30 CHRONIC RENAL DISEASE, STAGE 3, MODERATELY DECREASED GLOMERULAR FILTRATION RATE BETWEEN 30-59 ML/MIN/1.73 SQUARE METER (HCC): Primary | ICD-10-CM

## 2020-07-23 DIAGNOSIS — M85.89 OSTEOPENIA OF MULTIPLE SITES: ICD-10-CM

## 2020-07-23 DIAGNOSIS — B02.22 NEUROPATHIC POSTHERPETIC TRIGEMINAL NEURALGIA: ICD-10-CM

## 2020-07-23 DIAGNOSIS — M54.41 CHRONIC RIGHT-SIDED LOW BACK PAIN WITH RIGHT-SIDED SCIATICA: ICD-10-CM

## 2020-07-23 DIAGNOSIS — G89.29 CHRONIC RIGHT-SIDED LOW BACK PAIN WITH RIGHT-SIDED SCIATICA: ICD-10-CM

## 2020-07-23 DIAGNOSIS — I10 ESSENTIAL HYPERTENSION: ICD-10-CM

## 2020-07-23 PROCEDURE — 84443 ASSAY THYROID STIM HORMONE: CPT

## 2020-07-23 PROCEDURE — 80061 LIPID PANEL: CPT

## 2020-07-23 PROCEDURE — 80053 COMPREHEN METABOLIC PANEL: CPT

## 2020-07-23 PROCEDURE — 85025 COMPLETE CBC W/AUTO DIFF WBC: CPT

## 2020-07-23 NOTE — PATIENT INSTRUCTIONS
Shoulder Arthritis: Exercises  Introduction  Here are some examples of exercises for you to try. The exercises may be suggested for a condition or for rehabilitation. Start each exercise slowly. Ease off the exercises if you start to have pain. You will be told when to start these exercises and which ones will work best for you. How to do the exercises  Shoulder flexion (lying down)   To make a wand for this exercise, use a piece of PVC pipe or a broom handle with the broom removed. Make the wand about a foot wider than your shoulders. 1. Lie on your back, holding a wand with both hands. Your palms should face down as you hold the wand. 2. Keeping your elbows straight, slowly raise your arms over your head. Raise them until you feel a stretch in your shoulders, upper back, and chest.  3. Hold for 15 to 30 seconds. 4. Repeat 2 to 4 times. Shoulder rotation (lying down)   To make a wand for this exercise, use a piece of PVC pipe or a broom handle with the broom removed. Make the wand about a foot wider than your shoulders. 1. Lie on your back. Hold a wand with both hands with your elbows bent and palms up. 2. Keep your elbows close to your body, and move the wand across your body toward the sore arm. 3. Hold for 8 to 12 seconds. 4. Repeat 2 to 4 times. Shoulder internal rotation with towel   1. Hold a towel above and behind your head with the arm that is not sore. 2. With your sore arm, reach behind your back and grasp the towel. 3. With the arm above your head, pull the towel upward. Do this until you feel a stretch on the front and outside of your sore shoulder. 4. Hold 15 to 30 seconds. 5. Repeat 2 to 4 times. Shoulder blade squeeze   1. Stand with your arms at your sides, and squeeze your shoulder blades together. Do not raise your shoulders up as you squeeze. 2. Hold 6 seconds. 3. Repeat 8 to 12 times.     Resisted rows   For this exercise, you will need elastic exercise material, such as surgical tubing or Thera-Band. 1. Put the band around a solid object at about waist level. (A bedpost will work well.) Each hand should hold an end of the band. 2. With your elbows at your sides and bent to 90 degrees, pull the band back. Your shoulder blades should move toward each other. Return to the starting position. 3. Repeat 8 to 12 times. External rotator strengthening exercise   1. Start by tying a piece of elastic exercise material to a doorknob. You can use surgical tubing or Thera-Band. (You may also hold one end of the band in each hand.)  2. Stand or sit with your shoulder relaxed and your elbow bent 90 degrees. Your upper arm should rest comfortably against your side. Squeeze a rolled towel between your elbow and your body for comfort. This will help keep your arm at your side. 3. Hold one end of the elastic band with the hand of the painful arm. 4. Start with your forearm across your belly. Slowly rotate the forearm out away from your body. Keep your elbow and upper arm tucked against the towel roll or the side of your body until you begin to feel tightness in your shoulder. Slowly move your arm back to where you started. 5. Repeat 8 to 12 times. Internal rotator strengthening exercise   1. Start by tying a piece of elastic exercise material to a doorknob. You can use surgical tubing or Thera-Band. 2. Stand or sit with your shoulder relaxed and your elbow bent 90 degrees. Your upper arm should rest comfortably against your side. Squeeze a rolled towel between your elbow and your body for comfort. This will help keep your arm at your side. 3. Hold one end of the elastic band in the hand of the painful arm. 4. Slowly rotate your forearm toward your body until it touches your belly. Slowly move it back to where you started. 5. Keep your elbow and upper arm firmly tucked against the towel roll or at your side. 6. Repeat 8 to 12 times.     Pendulum swing   If you have pain in your back, do not do this exercise. 1. Hold on to a table or the back of a chair with your good arm. Then bend forward a little and let your sore arm hang straight down. This exercise does not use the arm muscles. Rather, use your legs and your hips to create movement that makes your arm swing freely. 2. Use the movement from your hips and legs to guide the slightly swinging arm back and forth like a pendulum (or elephant trunk). Then guide it in circles that start small (about the size of a dinner plate). Make the circles a bit larger each day, as your pain allows. 3. Do this exercise for 5 minutes, 5 to 7 times each day. 4. As you have less pain, try bending over a little farther to do this exercise. This will increase the amount of movement at your shoulder. Follow-up care is a key part of your treatment and safety. Be sure to make and go to all appointments, and call your doctor if you are having problems. It's also a good idea to know your test results and keep a list of the medicines you take. Where can you learn more? Go to http://dez-cedric.info/  Enter H562 in the search box to learn more about \"Shoulder Arthritis: Exercises. \"  Current as of: March 2, 2020               Content Version: 12.5  © 9318-9533 [employer]wise, Incorporated. Care instructions adapted under license by [employer] (which disclaims liability or warranty for this information). If you have questions about a medical condition or this instruction, always ask your healthcare professional. [place] [address] any warranty or liability for your use of this information.

## 2020-07-23 NOTE — PROGRESS NOTES
Chief Complaint   Patient presents with    Follow-up     6 month f/u     Injection     pt would like a cortisone injection in RT shoulder      1. Have you been to the ER, urgent care clinic since your last visit? Hospitalized since your last visit? No     2. Have you seen or consulted any other health care providers outside of the [address] [place] [address] since your last visit? Include any pap smears or colon screening.  Yes, [facility]- DR. Omero Esteban

## 2020-07-24 LAB
ALBUMIN SERPL-MCNC: 4.3 G/DL (ref 3.5–4.6)
ALBUMIN/GLOB SERPL: 1.7 {RATIO} (ref 1.2–2.2)
ALP SERPL-CCNC: 70 IU/L (ref 39–117)
ALT SERPL-CCNC: 11 IU/L (ref 0–32)
AST SERPL-CCNC: 17 IU/L (ref 0–40)
BASOPHILS # BLD AUTO: 0.1 X10E3/UL (ref 0–0.2)
BASOPHILS NFR BLD AUTO: 1 %
BILIRUB SERPL-MCNC: 0.6 MG/DL (ref 0–1.2)
BUN SERPL-MCNC: 16 MG/DL (ref 10–36)
BUN/CREAT SERPL: 15 (ref 12–28)
CALCIUM SERPL-MCNC: 9.7 MG/DL (ref 8.7–10.3)
CHLORIDE SERPL-SCNC: 96 MMOL/L (ref 96–106)
CHOLEST SERPL-MCNC: 252 MG/DL (ref 100–199)
CO2 SERPL-SCNC: 21 MMOL/L (ref 20–29)
CREAT SERPL-MCNC: 1.07 MG/DL (ref 0.57–1)
EOSINOPHIL # BLD AUTO: 0 X10E3/UL (ref 0–0.4)
EOSINOPHIL NFR BLD AUTO: 1 %
ERYTHROCYTE [DISTWIDTH] IN BLOOD BY AUTOMATED COUNT: 13.6 % (ref 11.7–15.4)
GLOBULIN SER CALC-MCNC: 2.6 G/DL (ref 1.5–4.5)
GLUCOSE SERPL-MCNC: 91 MG/DL (ref 65–99)
HCT VFR BLD AUTO: 47.8 % (ref 34–46.6)
HDLC SERPL-MCNC: 96 MG/DL
HGB BLD-MCNC: 16.4 G/DL (ref 11.1–15.9)
IMM GRANULOCYTES # BLD AUTO: 0 X10E3/UL (ref 0–0.1)
IMM GRANULOCYTES NFR BLD AUTO: 0 %
INTERPRETATION, 910389: NORMAL
INTERPRETATION: NORMAL
LDLC SERPL CALC-MCNC: 136 MG/DL (ref 0–99)
LYMPHOCYTES # BLD AUTO: 1 X10E3/UL (ref 0.7–3.1)
LYMPHOCYTES NFR BLD AUTO: 16 %
MCH RBC QN AUTO: 30.5 PG (ref 26.6–33)
MCHC RBC AUTO-ENTMCNC: 34.3 G/DL (ref 31.5–35.7)
MCV RBC AUTO: 89 FL (ref 79–97)
MONOCYTES # BLD AUTO: 0.6 X10E3/UL (ref 0.1–0.9)
MONOCYTES NFR BLD AUTO: 10 %
NEUTROPHILS # BLD AUTO: 4.9 X10E3/UL (ref 1.4–7)
NEUTROPHILS NFR BLD AUTO: 72 %
PDF IMAGE, 910387: NORMAL
PLATELET # BLD AUTO: 282 X10E3/UL (ref 150–450)
POTASSIUM SERPL-SCNC: 5 MMOL/L (ref 3.5–5.2)
PROT SERPL-MCNC: 6.9 G/DL (ref 6–8.5)
RBC # BLD AUTO: 5.37 X10E6/UL (ref 3.77–5.28)
SODIUM SERPL-SCNC: 132 MMOL/L (ref 134–144)
TRIGL SERPL-MCNC: 99 MG/DL (ref 0–149)
TSH SERPL DL<=0.005 MIU/L-ACNC: 2.22 UIU/ML (ref 0.45–4.5)
VLDLC SERPL CALC-MCNC: 20 MG/DL (ref 5–40)
WBC # BLD AUTO: 6.7 X10E3/UL (ref 3.4–10.8)

## 2020-09-03 DIAGNOSIS — R30.9 URINARY PAIN: Primary | ICD-10-CM

## 2020-09-03 RX ORDER — CEPHALEXIN 500 MG/1
500 CAPSULE ORAL 3 TIMES DAILY
Qty: 21 CAP | Refills: 0 | Status: SHIPPED | OUTPATIENT
Start: 2020-09-03 | End: 2020-09-10

## 2020-09-28 ENCOUNTER — PATIENT MESSAGE (OUTPATIENT)
Dept: FAMILY MEDICINE CLINIC | Age: 85
End: 2020-09-28

## 2020-09-28 RX ORDER — ALENDRONATE SODIUM 70 MG/1
70 TABLET ORAL
Qty: 13 TAB | Refills: 3 | Status: SHIPPED | OUTPATIENT
Start: 2020-09-28 | End: 2021-07-08 | Stop reason: SDUPTHER

## 2020-09-28 NOTE — TELEPHONE ENCOUNTER
From: Shaggy Espinal  To: Michelle Melissa MD  Sent: 9/28/2020 12:15 PM EDT  Subject: Prescription Question    Oops, just checked and I have one more refill so cancel gabapentin RX, it is Alendronate 70 mg for which I need a new RX, no more refills. Old age can get one! Reminder to [facility], please. Thanks so much!  Norma Parker

## 2020-10-08 DIAGNOSIS — G89.29 CHRONIC RIGHT-SIDED LOW BACK PAIN WITH RIGHT-SIDED SCIATICA: Primary | ICD-10-CM

## 2020-10-08 DIAGNOSIS — M54.41 CHRONIC RIGHT-SIDED LOW BACK PAIN WITH RIGHT-SIDED SCIATICA: Primary | ICD-10-CM

## 2020-10-23 DIAGNOSIS — G89.29 CHRONIC RIGHT-SIDED LOW BACK PAIN WITH RIGHT-SIDED SCIATICA: Primary | ICD-10-CM

## 2020-10-23 DIAGNOSIS — M54.41 CHRONIC RIGHT-SIDED LOW BACK PAIN WITH RIGHT-SIDED SCIATICA: Primary | ICD-10-CM

## 2020-10-28 ENCOUNTER — HOSPITAL ENCOUNTER (OUTPATIENT)
Dept: CT IMAGING | Age: 85
Discharge: HOME | End: 2020-10-28
Attending: FAMILY MEDICINE
Payer: MEDICARE

## 2020-10-28 DIAGNOSIS — M54.41 CHRONIC RIGHT-SIDED LOW BACK PAIN WITH RIGHT-SIDED SCIATICA: ICD-10-CM

## 2020-10-28 DIAGNOSIS — G89.29 CHRONIC RIGHT-SIDED LOW BACK PAIN WITH RIGHT-SIDED SCIATICA: ICD-10-CM

## 2020-10-28 PROCEDURE — 72131 CT LUMBAR SPINE W/O DYE: CPT

## 2020-11-16 NOTE — PROGRESS NOTES
Patient reached  COVID screened  Denies use of bloodthinners  Will have daughter drive to and from procedure  Arrival [phone]

## 2020-11-17 ENCOUNTER — HOSPITAL ENCOUNTER (OUTPATIENT)
Dept: INTERVENTIONAL RADIOLOGY/VASCULAR | Age: 85
Discharge: HOME | End: 2020-11-17
Attending: FAMILY MEDICINE | Admitting: FAMILY MEDICINE
Payer: MEDICARE

## 2020-11-17 VITALS — WEIGHT: 154.32 LBS | BODY MASS INDEX: 26.35 KG/M2 | HEIGHT: 64 IN

## 2020-11-17 DIAGNOSIS — G89.29 CHRONIC RIGHT-SIDED LOW BACK PAIN WITH RIGHT-SIDED SCIATICA: ICD-10-CM

## 2020-11-17 DIAGNOSIS — M54.41 CHRONIC RIGHT-SIDED LOW BACK PAIN WITH RIGHT-SIDED SCIATICA: ICD-10-CM

## 2020-11-17 PROCEDURE — 74011000250 HC RX REV CODE- 250: Performed by: RADIOLOGY

## 2020-11-17 PROCEDURE — 74011250636 HC RX REV CODE- 250/636: Performed by: RADIOLOGY

## 2020-11-17 PROCEDURE — 77030003666 HC NDL SPINAL BD -A

## 2020-11-17 PROCEDURE — 74011000636 HC RX REV CODE- 636: Performed by: RADIOLOGY

## 2020-11-17 PROCEDURE — 64484 NJX AA&/STRD TFRM EPI L/S EA: CPT

## 2020-11-17 PROCEDURE — 64483 NJX AA&/STRD TFRM EPI L/S 1: CPT

## 2020-11-17 RX ORDER — DEXAMETHASONE SODIUM PHOSPHATE 10 MG/ML
10 INJECTION INTRAMUSCULAR; INTRAVENOUS ONCE
Status: COMPLETED | OUTPATIENT
Start: 2020-11-17 | End: 2020-11-17

## 2020-11-17 RX ORDER — LIDOCAINE HYDROCHLORIDE 10 MG/ML
8 INJECTION, SOLUTION EPIDURAL; INFILTRATION; INTRACAUDAL; PERINEURAL
Status: DISCONTINUED | OUTPATIENT
Start: 2020-11-17 | End: 2020-11-17 | Stop reason: HOSPADM

## 2020-11-17 RX ADMIN — IOPAMIDOL 3 ML: 408 INJECTION, SOLUTION INTRATHECAL at 10:59

## 2020-11-17 RX ADMIN — LIDOCAINE HYDROCHLORIDE 8 ML: 10 INJECTION, SOLUTION EPIDURAL; INFILTRATION; INTRACAUDAL; PERINEURAL at 10:58

## 2020-11-17 RX ADMIN — DEXAMETHASONE SODIUM PHOSPHATE 10 MG: 10 INJECTION, SOLUTION INTRAMUSCULAR; INTRAVENOUS at 10:59

## 2020-11-17 NOTE — ROUTINE PROCESS
9:19 AM  Patient arrived. ID and allergies verified verbally with patient. Pt voices understanding of procedure to be performed. Consent obtained. Pt prepped for procedure. 9:49 AM  Discharge instructions reviewed with patient and family. Voiced understanding. Patient given copy of discharge instructions to take home. 10:45 AM  TRANSFER - OUT REPORT:    Verbal report given to Sekou Greer RN(name) on [facility]  being transferred to IR(unit) for ordered procedure       Report consisted of patients Situation, Background, Assessment and   Recommendations(SBAR). Information from the following report(s) SBAR was reviewed with the receiving nurse. Lines:       Opportunity for questions and clarification was provided. Patient transported with:   Registered Nurse    11:13 AM  TRANSFER - IN REPORT:    Verbal report received from Sekou Greer RN(name) on [facility]  being received from IR(unit) for routine progression of care      Report consisted of patients Situation, Background, Assessment and   Recommendations(SBAR). Information from the following report(s) Procedure Summary was reviewed with the receiving nurse. Opportunity for questions and clarification was provided. Assessment completed upon patients arrival to unit and care assumed. 11:30 AM  Pt discharged via wheelchair with family. Personal belongings with patient upon discharge.

## 2020-11-17 NOTE — DISCHARGE INSTRUCTIONS
Patient Education        Lumbar Epidural Steroid Injection: What to Expect at [address] [place] [address]  During your lumbar epidural injection, your doctor injected steroid medicine into the area around your spinal cord to help with pain, tingling, or numbness. Steroids don't always work. And when they do, it takes a few days. But the pain relief can last for several days to a few months or longer. Your injection may also have included a numbing medicine that works right away for a short time. It may leave your legs feeling heavy or numb at first. You will probably be able to walk. But you may need to be extra careful. The effects of the numbing medicine should wear off in a few hours. This care sheet gives you a general idea about how long it will take for you to recover. But each person recovers at a different pace. Follow the steps below to feel better as quickly as possible. How can you care for yourself at home? Activity    · You may want to do less than normal for a few days. But you may also be able to return to your daily routine.     · You may shower if your doctor okays it. Do not take a bath for the first 24 hours, or until your doctor tells you it is okay. Diet    · You can eat your normal diet. Medicines    · Your doctor will tell you if and when you can restart your medicines. He or she will also give you instructions about taking any new medicines.     · If you take aspirin or some other blood thinner, ask your doctor if and when to start taking it again. Make sure that you understand exactly what your doctor wants you to do.     · Be safe with medicines. Read and follow all instructions on the label. ? If the doctor gave you a prescription medicine for pain, take it as prescribed. ? If you are not taking a prescription pain medicine, ask your doctor if you can take an over-the-counter medicine.    Ice    · If the site of your injection feels sore or tender, put ice or a cold pack on it for 10 to 20 minutes at a time. Put a thin cloth between the ice and your skin. Follow-up care is a key part of your treatment and safety. Be sure to make and go to all appointments, and call your doctor if you are having problems. It's also a good idea to know your test results and keep a list of the medicines you take. When should you call for help? Call 911 anytime you think you may need emergency care. For example, call if:    · You passed out (lost consciousness).     · You have trouble breathing. Call your doctor now or seek immediate medical care if:    · You have new pain, or your pain gets worse.     · You have new numbness in your buttocks or legs.     · You have new weakness in your buttocks or legs.     · You have a severe headache.     · You have new loss of bowel or bladder control.     · You have signs of infection, such as:  ? Increased pain, swelling, warmth, or redness. ? A fever. Watch closely for any changes in your health, and be sure to contact your doctor if:    · You do not get better as expected. Where can you learn more? Go to http://www.gray.com/  Enter G895 in the search box to learn more about \"Lumbar Epidural Steroid Injection: What to Expect at Home. \"  Current as of: March 2, 2020               Content Version: 12.6  © 8441-2303 [employer]. Care instructions adapted under license by [employer] (which disclaims liability or warranty for this information). If you have questions about a medical condition or this instruction, always ask your healthcare professional. [place] [address] any warranty or liability for your use of this information.

## 2020-11-19 ENCOUNTER — OFFICE VISIT (OUTPATIENT)
Dept: FAMILY MEDICINE CLINIC | Age: 85
End: 2020-11-19
Payer: MEDICARE

## 2020-11-19 VITALS
WEIGHT: 156 LBS | TEMPERATURE: 97.9 F | DIASTOLIC BLOOD PRESSURE: 63 MMHG | RESPIRATION RATE: 16 BRPM | HEART RATE: 64 BPM | HEIGHT: 64 IN | SYSTOLIC BLOOD PRESSURE: 164 MMHG | BODY MASS INDEX: 26.63 KG/M2

## 2020-11-19 DIAGNOSIS — I10 ESSENTIAL HYPERTENSION: ICD-10-CM

## 2020-11-19 DIAGNOSIS — M25.511 CHRONIC RIGHT SHOULDER PAIN: Primary | ICD-10-CM

## 2020-11-19 DIAGNOSIS — R26.89 BALANCE PROBLEM: ICD-10-CM

## 2020-11-19 DIAGNOSIS — G89.29 CHRONIC RIGHT SHOULDER PAIN: Primary | ICD-10-CM

## 2020-11-19 PROCEDURE — 99214 OFFICE O/P EST MOD 30 MIN: CPT | Performed by: FAMILY MEDICINE

## 2020-11-19 PROCEDURE — 1101F PT FALLS ASSESS-DOCD LE1/YR: CPT | Performed by: FAMILY MEDICINE

## 2020-11-19 PROCEDURE — 1090F PRES/ABSN URINE INCON ASSESS: CPT | Performed by: FAMILY MEDICINE

## 2020-11-19 PROCEDURE — G8536 NO DOC ELDER MAL SCRN: HCPCS | Performed by: FAMILY MEDICINE

## 2020-11-19 PROCEDURE — G8419 CALC BMI OUT NRM PARAM NOF/U: HCPCS | Performed by: FAMILY MEDICINE

## 2020-11-19 PROCEDURE — G8427 DOCREV CUR MEDS BY ELIG CLIN: HCPCS | Performed by: FAMILY MEDICINE

## 2020-11-19 PROCEDURE — 20610 DRAIN/INJ JOINT/BURSA W/O US: CPT | Performed by: FAMILY MEDICINE

## 2020-11-19 PROCEDURE — G8432 DEP SCR NOT DOC, RNG: HCPCS | Performed by: FAMILY MEDICINE

## 2020-11-19 PROCEDURE — G0463 HOSPITAL OUTPT CLINIC VISIT: HCPCS | Performed by: FAMILY MEDICINE

## 2020-11-19 RX ORDER — FLUOCINONIDE GEL 0.5 MG/G
GEL TOPICAL
Qty: 30 G | Refills: 1 | Status: SHIPPED | OUTPATIENT
Start: 2020-11-19 | End: 2021-04-08

## 2020-11-19 RX ORDER — TRIAMCINOLONE ACETONIDE 40 MG/ML
40 INJECTION, SUSPENSION INTRA-ARTICULAR; INTRAMUSCULAR ONCE
Qty: 1 ML | Refills: 0
Start: 2020-11-19 | End: 2020-11-19

## 2020-11-19 NOTE — PROGRESS NOTES
Chief Complaint   Patient presents with    Shoulder Pain     1. Have you been to the ER, urgent care clinic since your last visit? Hospitalized since your last visit?no    2. Have you seen or consulted any other health care providers outside of the [address] [place] [address] since your last visit? Include any pap smears or colon screening.  Dentist  Eye doctor  Hearing Doctor

## 2020-11-19 NOTE — PROGRESS NOTES
Jayme Treviño [facility]. [place], [address]  [phone]             Date of visit: 11/19/2020   Subjective:      History obtained from:  the patient.   Fawad Lee is a 80 y.o. female who presents today for   Chief Complaint   Patient presents with    Shoulder Pain     Wants right shoulder injection  Painful a lot, francis at night      Has done lots of PT on both shoulders  Recently discharged as not making progress  We have injected left shoulder 4x, most recently in Aug 2019  Has had only 1 right shoulder injection but thinks it helps  I am not sure she has had a right shoulder x-ray,   She doesn't recall getting one but is willing    Says she can't regress, laly try to do the exercises she learned in PT    Very itchy spot right posterior shoulder wants me to check  Intensely itchy  Using gold bond cream only    2 days ago got another epidural steroid injection  Thinks it is starting to help, can stand up straight    Wants to do more PT for balance therapy  Just needs order to her usual therapist Valentino Failing at [address] falling out more since we stopped estrogen and less energy      Patient Active Problem List    Diagnosis Date Noted    Iron deficiency anemia 12/12/2019    Allergic rhinoconjunctivitis, seasonal and perennial 09/05/2019    Chronic right-sided low back pain with right-sided sciatica 02/06/2019    Spinal stenosis of lumbar region with neurogenic claudication 02/06/2019    Foraminal stenosis of lumbar region 02/06/2019    Chronic renal disease, stage 3, moderately decreased glomerular filtration rate between 30-59 mL/min/1.73 square meter 07/19/2018    Osteopenia of multiple sites 02/02/2018    Atopic dermatitis 01/16/2018    Chronic left shoulder pain 01/16/2018    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  triamcinolone acetonide (Kenalog) 40 mg/mL injection 1 mL by Intra artICUlar route once for 1 dose. 1 mL 0    fluocinonide (LIDEX) 0.05 % topical gel Apply  to affected area two (2) times daily as needed (eczema). 30 g 1    alendronate (FOSAMAX) 70 mg tablet Take 1 Tab by mouth every seven (7) days. 13 Tab 3    gabapentin (NEURONTIN) 100 mg capsule Take 2 Caps by mouth three (3) times daily. Max Daily Amount: 600 mg. Fax [facility] [phone] 540 Cap 1    levothyroxine (SYNTHROID) 50 mcg tablet TAKE 1 TABLET BY MOUTH EVERY DAY 90 Tab 1    acyclovir (ZOVIRAX) 800 mg tablet Take 1 Tab by mouth two (2) times a day. 180 Tab 1    lidocaine (XYLOCAINE) 2 % solution Take 15 mL by mouth every three (3) hours as needed for Pain. Swish and spit 1 Bottle 3    lisinopril (PRINIVIL, ZESTRIL) 2.5 mg tablet TAKE 1 TABLET BY MOUTH EVERY DAY 90 Tab 3    fexofenadine (ALLEGRA) 180 mg tablet Take 1 Tab by mouth nightly.  metroNIDAZOLE (METROGEL) 1 % topical gel Apply  to affected area daily. Use a thin layer to affected areas after washing for rosacea 45 g 3    oxybutynin (DITROPAN) 5 mg tablet Take 1 Tab by mouth daily as needed. 90 Tab 1    doxycycline (ADOXA) 100 mg tablet Take 100 mg by mouth two (2) times a day. AS NEEDED      CALCIUM CARB/VIT D2/MINERALS (CALTRATE PLUS PO) Take 500 mg by mouth daily.  mv,Ca,min-iron-FA-guarana-[facility] ACTIVE) 18 mg iron- 400 mcg-180 mg tab Take  by mouth.        Allergies   Allergen Reactions    Voltaren [Diclofenac Sodium] Anaphylaxis    [place] Other (comments)    Iron Other (comments)     GI UPSET AND DIZZINESS     Lyrica [Pregabalin] Drowsiness    Neosporin [Benzalkonium Chloride] Rash    Sulfa (Sulfonamide Antibiotics) Other (comments)     Past Medical History:   Diagnosis Date    Arthritis     Bowel disease     Eczema     History of hormone replacement therapy 1987    stopped    Hypertension 6137    Lichen planus     Oral    Menopause 1987    Trigeminal neuralgia      Past Surgical History:   Procedure Laterality Date    HX BREAST BIOPSY Left 2014    Stereo    HX COLONOSCOPY  2012    HX CYST INCISION AND DRAINAGE Right 04/16/2018    benign    HX KNEE REPLACEMENT Right 2014    HX ROTATOR CUFF REPAIR Right 2001    IR INJ Diogenes Josué EPID LUMB ANES/STER SNGL  2/20/2019    IR INJ FORAMIN EPID LUMB ANES/STER SNGL  6/21/2019    IR INJ FORAMIN EPID LUMB ANES/STER SNGL  12/20/2019    IR INJ FORAMIN EPID LUMB ANES/STER SNGL  11/17/2020     Family History   Problem Relation Age of Onset    Other Mother         severe hot flashes    Heart Failure Mother 76    Breast Cancer Mother 76    Other Daughter         severe hot flashes    Heart Attack Father 46    Breast Cancer Maternal Aunt      Social History     Tobacco Use    Smoking status: Former Smoker    Smokeless tobacco: Never Used   Substance Use Topics    Alcohol use: Yes     Alcohol/week: 14.0 standard drinks     Types: 8 Glasses of wine, 6 Shots of liquor per week     Comment: occ. Social History     Social History Narrative    [facility]    Daughter Kathy Evans lives nearby        Review of Systems  Gen: denies fever  pulm denies cough      Objective:     Vitals:    11/19/20 1129   BP: (!) 164/63   Pulse: 64   Resp: 16   Temp: 97.9 °F (36.6 °C)   TempSrc: Oral   Weight: 156 lb (70.8 kg)   Height: 5' 4\" (1.626 m)     Body mass index is 26.78 kg/m².      General: stated age, well developed, well nourished and in NAD  MSK: No deformities, palpation: tender anterior glenoid, range of motion: normal but pain with internal rotation, rotator cuff strength: normal but pain with external rotation  Skin:  Normal. and no rash or abnormalities but skin does look dry right posterior shoulder  Neuro: has to hold on to things when standing up, walking, seems mildly off balance  Psych: alert and oriented to person, place, time and situation and Speech: appropriate quality, quantity and organization of sentences      Assessment/Plan:       ICD-10-CM ICD-9-CM    1. Chronic right shoulder pain  M25.511 719.41 TRIAMCINOLONE ACETONIDE INJ    G89.29 338.29 triamcinolone acetonide (Kenalog) 40 mg/mL injection      CA DRAIN/INJECT LARGE JOINT/BURSA      XR SHOULDER RT AP/LAT MIN 2 V   2. Balance problem  R26.89 781.99 REFERRAL TO PHYSICAL THERAPY   3. Essential hypertension  I10 401.9         Orders Placed This Encounter    XR SHOULDER RT AP/LAT MIN 2 V    REFERRAL TO PHYSICAL THERAPY    TRIAMCINOLONE ACETONIDE INJ    90995 - DRAIN/INJECT LARGE JOINT/BURSA    calcium carbonate (CALTRATE 600 PO)    triamcinolone acetonide (Kenalog) 40 mg/mL injection    fluocinonide (LIDEX) 0.05 % topical gel       Will do another injection  Continue home exercises she learned in PT  Seems to be rotator cuff tendinitis  Reviewed worrisome signs or symptoms for which to call.     Refer back to PT for more balance training she has done before  Always at risk of falls and that seems to help her a lot    Refill lidex for eczema to use on shoulder    bp high today but normally ok at home    [facility] NOTE        Chart reviewed for the following:   Rosa Isela Waterman MD, have reviewed the History, Physical and updated the Allergic reactions for Oralia Mao performed immediately prior to start of procedure:   Rosa Isela Waterman MD, have performed the following reviews on [place] prior to the start of the procedure: * Patient was identified by name and date of birth   * Agreement on procedure being performed was verified  * Risks and Benefits explained to the patient  * Procedure site verified and marked as necessary  * Patient was positioned for comfort  * Consent was signed and verified     Time: 5156      Date of procedure: 11/19/2020    Procedure performed by:  Shanita Cabello MD    Provider assisted by:  none    Patient assisted by: self    How tolerated by patient: tolerated the procedure well with no complications    Post Procedural Pain Scale: 0 - No Hurt    Comments: After informed consent, the right shoulder joint was each prepped with chloraprep, numbed with cooling spray, and was each injected into the subacromial space from a posterior approach using no-touch sterile technique with 2 mL 1% lidocaine/ 40 mg triamcinolone. Patient tolerated procedure well and there were no complications. Discussed the diagnosis and plan and she expressed understanding.   F/u 1/26 AWV as planned      Shanita Cabello MD

## 2020-11-19 NOTE — PATIENT INSTRUCTIONS
Joint Injections: Care Instructions Your Care Instructions Joint injections are shots into a joint, such as the knee. They may be used to put in medicines, such as pain relievers. A corticosteroid, or steroid, shot is used to reduce inflammation in tendons or joints. It is often used to treat problems such as arthritis, tendinitis, and bursitis. Steroids can be injected directly into a painful, inflamed joint. They can also help reduce inflammation of a bursa. A bursa is a sac of fluid. It cushions and lubricates areas where tendons, ligaments, skin, muscles, or bones rub against each other. A steroid shot can sometimes help with short-term pain relief when other treatments haven't worked. If steroid shots help, pain may improve for weeks or months. Follow-up care is a key part of your treatment and safety. Be sure to make and go to all appointments, and call your doctor if you are having problems. It's also a good idea to know your test results and keep a list of the medicines you take. How can you care for yourself at home? · Put ice or a cold pack on the area for 10 to 20 minutes at a time. Put a thin cloth between the ice and your skin. · Ask your doctor if you can take an over-the-counter pain medicine, such as acetaminophen (Tylenol), ibuprofen (Advil, Motrin), or naproxen (Aleve). Be safe with medicines. Read and follow all instructions on the label. · Avoid strenuous activities for several days. In particular, avoid ones that put stress on the area where you got the shot. · If you have dressings over the area, keep them clean and dry. You may remove them when your doctor tells you to. When should you call for help? Call your doctor now or seek immediate medical care if: 
  · You have signs of infection, such as: 
? Increased pain, swelling, warmth, or redness. ? Red streaks leading from the site. ? Pus draining from the site. ? A fever. Watch closely for changes in your health, and be sure to contact your doctor if you have any problems. Where can you learn more? Go to http://www.gray.com/ Enter N616 in the search box to learn more about \"Joint Injections: Care Instructions. \" Current as of: March 2, 2020               Content Version: 12.6 © 9665-3920 [employer]. Care instructions adapted under license by [employer] (which disclaims liability or warranty for this information). If you have questions about a medical condition or this instruction, always ask your healthcare professional. [address] any warranty or liability for your use of this information.

## 2020-12-04 ENCOUNTER — HOSPITAL ENCOUNTER (OUTPATIENT)
Dept: GENERAL RADIOLOGY | Age: 85
Discharge: HOME | End: 2020-12-04
Attending: FAMILY MEDICINE
Payer: MEDICARE

## 2020-12-04 DIAGNOSIS — M25.511 CHRONIC RIGHT SHOULDER PAIN: ICD-10-CM

## 2020-12-04 DIAGNOSIS — G89.29 CHRONIC RIGHT SHOULDER PAIN: ICD-10-CM

## 2020-12-04 PROCEDURE — 73030 X-RAY EXAM OF SHOULDER: CPT

## 2020-12-21 DIAGNOSIS — B02.22 NEUROPATHIC POSTHERPETIC TRIGEMINAL NEURALGIA: ICD-10-CM

## 2020-12-29 DIAGNOSIS — B02.22 NEUROPATHIC POSTHERPETIC TRIGEMINAL NEURALGIA: ICD-10-CM

## 2020-12-29 RX ORDER — GABAPENTIN 100 MG/1
200 CAPSULE ORAL 3 TIMES DAILY
Qty: 540 CAP | Refills: 1 | Status: SHIPPED | OUTPATIENT
Start: 2020-12-29 | End: 2021-07-08 | Stop reason: SDUPTHER

## 2021-01-25 ENCOUNTER — OFFICE VISIT (OUTPATIENT)
Dept: FAMILY MEDICINE CLINIC | Age: 86
End: 2021-01-25
Payer: MEDICARE

## 2021-01-25 VITALS
SYSTOLIC BLOOD PRESSURE: 150 MMHG | WEIGHT: 154.6 LBS | HEART RATE: 77 BPM | RESPIRATION RATE: 18 BRPM | OXYGEN SATURATION: 98 % | HEIGHT: 64 IN | BODY MASS INDEX: 26.4 KG/M2 | TEMPERATURE: 97.9 F | DIASTOLIC BLOOD PRESSURE: 70 MMHG

## 2021-01-25 DIAGNOSIS — Z00.00 ENCOUNTER FOR MEDICARE ANNUAL WELLNESS EXAM: Primary | ICD-10-CM

## 2021-01-25 DIAGNOSIS — B02.22 NEUROPATHIC POSTHERPETIC TRIGEMINAL NEURALGIA: ICD-10-CM

## 2021-01-25 DIAGNOSIS — R39.15 URINARY URGENCY: ICD-10-CM

## 2021-01-25 DIAGNOSIS — Z76.89 ENCOUNTER TO ESTABLISH CARE WITH NEW DOCTOR: ICD-10-CM

## 2021-01-25 DIAGNOSIS — I10 WHITE COAT SYNDROME WITH HYPERTENSION: ICD-10-CM

## 2021-01-25 PROBLEM — L65.8 FEMALE PATTERN HAIR LOSS: Status: RESOLVED | Noted: 2018-01-16 | Resolved: 2021-01-25

## 2021-01-25 PROBLEM — G89.29 CHRONIC RIGHT-SIDED LOW BACK PAIN WITH RIGHT-SIDED SCIATICA: Status: RESOLVED | Noted: 2019-02-06 | Resolved: 2021-01-25

## 2021-01-25 PROBLEM — L20.9 ATOPIC DERMATITIS: Status: RESOLVED | Noted: 2018-01-16 | Resolved: 2021-01-25

## 2021-01-25 PROBLEM — M25.512 CHRONIC LEFT SHOULDER PAIN: Status: RESOLVED | Noted: 2018-01-16 | Resolved: 2021-01-25

## 2021-01-25 PROBLEM — Z79.890 POSTMENOPAUSAL HRT (HORMONE REPLACEMENT THERAPY): Status: RESOLVED | Noted: 2017-11-29 | Resolved: 2021-01-25

## 2021-01-25 PROBLEM — M79.671 CHRONIC FOOT PAIN, RIGHT: Status: RESOLVED | Noted: 2017-11-28 | Resolved: 2021-01-25

## 2021-01-25 PROBLEM — M48.061 FORAMINAL STENOSIS OF LUMBAR REGION: Status: RESOLVED | Noted: 2019-02-06 | Resolved: 2021-01-25

## 2021-01-25 PROBLEM — G89.29 CHRONIC LEFT SHOULDER PAIN: Status: RESOLVED | Noted: 2018-01-16 | Resolved: 2021-01-25

## 2021-01-25 PROBLEM — M54.41 CHRONIC RIGHT-SIDED LOW BACK PAIN WITH RIGHT-SIDED SCIATICA: Status: RESOLVED | Noted: 2019-02-06 | Resolved: 2021-01-25

## 2021-01-25 PROBLEM — M25.551 RIGHT HIP PAIN: Status: RESOLVED | Noted: 2017-11-28 | Resolved: 2021-01-25

## 2021-01-25 PROBLEM — G89.29 CHRONIC FOOT PAIN, RIGHT: Status: RESOLVED | Noted: 2017-11-28 | Resolved: 2021-01-25

## 2021-01-25 PROCEDURE — G8536 NO DOC ELDER MAL SCRN: HCPCS | Performed by: FAMILY MEDICINE

## 2021-01-25 PROCEDURE — G0439 PPPS, SUBSEQ VISIT: HCPCS | Performed by: FAMILY MEDICINE

## 2021-01-25 PROCEDURE — G8427 DOCREV CUR MEDS BY ELIG CLIN: HCPCS | Performed by: FAMILY MEDICINE

## 2021-01-25 PROCEDURE — G8510 SCR DEP NEG, NO PLAN REQD: HCPCS | Performed by: FAMILY MEDICINE

## 2021-01-25 PROCEDURE — G8419 CALC BMI OUT NRM PARAM NOF/U: HCPCS | Performed by: FAMILY MEDICINE

## 2021-01-25 PROCEDURE — 99214 OFFICE O/P EST MOD 30 MIN: CPT | Performed by: FAMILY MEDICINE

## 2021-01-25 PROCEDURE — 1090F PRES/ABSN URINE INCON ASSESS: CPT | Performed by: FAMILY MEDICINE

## 2021-01-25 PROCEDURE — G0463 HOSPITAL OUTPT CLINIC VISIT: HCPCS | Performed by: FAMILY MEDICINE

## 2021-01-25 PROCEDURE — 1101F PT FALLS ASSESS-DOCD LE1/YR: CPT | Performed by: FAMILY MEDICINE

## 2021-01-25 RX ORDER — DIAPER,BRIEF,INFANT-TODD,DISP
EACH MISCELLANEOUS
COMMUNITY

## 2021-01-25 NOTE — PROGRESS NOTES
Patient Name: Jarret Jones   MRN: [id number]    Charbel Ratliff is a 80 y.o. female who presents with the following: Transferring care from prior PCP Dr. Ramos Pantoja. [facility] for bilateral shoulder pain due to rotator cuff issues. She also has history of a right knee replacement and right foot tendon rupture so her balance is often off. For the past 4 weeks, she will wake up feeling urgency to pee but drinking water seems to resolve it. Denies any nausea, fever, abdominal pain, dysuria. 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 Experiences throbbing pain along the left side of her face. Takes gabapentin 200 mg 3 times a day. It does make her drowsy sometimes so she will sometimes take less. Is a history of high blood pressure as well as some whitecoat hypertension. Home BP values have been normal.    BP Readings from Last 3 Encounters:   01/25/21 (!) 150/70   11/19/20 (!) 164/63   07/23/20 148/86     Wt Readings from Last 3 Encounters:   01/25/21 154 lb 9.6 oz (70.1 kg)   11/19/20 156 lb (70.8 kg)   11/17/20 154 lb 5.2 oz (70 kg)       Review of Systems   Constitutional: Negative for fever, malaise/fatigue and weight loss. Respiratory: Negative for cough, hemoptysis, shortness of breath and wheezing. Cardiovascular: Negative for chest pain, palpitations, leg swelling and PND. Gastrointestinal: Negative for abdominal pain, constipation, diarrhea, nausea and vomiting. Genitourinary: Positive for frequency and urgency. Negative for dysuria, flank pain and hematuria. Musculoskeletal: Positive for joint pain. Neurological: Positive for tingling. The patient's medications, allergies, past medical history, surgical history, family history and social history were reviewed and updated where appropriate. Prior to Admission medications    Medication Sig Start Date End Date Taking?  Authorizing Provider biotin 10,000 mcg cap Take  by mouth. Yes Provider, Historical   gabapentin (NEURONTIN) 100 mg capsule Take 2 Caps by mouth three (3) times daily. Max Daily Amount: 600 mg. Dong Alba [phone] 12/29/20  Yes Sherryle Alken, MD   fluocinonide (LIDEX) 0.05 % topical gel Apply  to affected area two (2) times daily as needed (eczema). 11/19/20  Yes Yoan Stewart MD   alendronate (FOSAMAX) 70 mg tablet Take 1 Tab by mouth every seven (7) days. 9/28/20  Yes Yoan Stewart MD   levothyroxine (SYNTHROID) 50 mcg tablet TAKE 1 TABLET BY MOUTH EVERY DAY 7/4/20  Yes Yoan Stewart MD   acyclovir (ZOVIRAX) 800 mg tablet Take 1 Tab by mouth two (2) times a day. 5/9/20  Yes Yoan Stewart MD   lidocaine (XYLOCAINE) 2 % solution Take 15 mL by mouth every three (3) hours as needed for Pain. Swish and spit 4/5/20  Yes Sherryle Alken, MD   lisinopril (PRINIVIL, ZESTRIL) 2.5 mg tablet TAKE 1 TABLET BY MOUTH EVERY DAY 1/29/20  Yes Yoan Stewart MD   fexofenadine (ALLEGRA) 180 mg tablet Take 1 Tab by mouth nightly. 1/23/20  Yes Yoan Stewart MD   metroNIDAZOLE (METROGEL) 1 % topical gel Apply  to affected area daily. Use a thin layer to affected areas after washing for rosacea 1/23/20  Yes Yoan Stewart MD   oxybutynin (DITROPAN) 5 mg tablet Take 1 Tab by mouth daily as needed. 7/19/18  Yes Yoan Stewart MD   doxycycline (ADOXA) 100 mg tablet Take 100 mg by mouth two (2) times a day. AS NEEDED   Yes Provider, Historical   CALCIUM CARB/VIT D2/MINERALS (CALTRATE PLUS PO) Take 500 mg by mouth daily.    Yes Provider, Historical   calcium carbonate (CALTRATE 600 PO) Take  by mouth.  1/25/21  Provider, Historical   mv,Ca,min-iron-FA-guarana-caff [facility] ACTIVE) 18 mg iron- 400 mcg-180 mg tab Take  by mouth.  1/25/21  Provider, Historical       Allergies   Allergen Reactions    Voltaren [Diclofenac Sodium] Anaphylaxis    [place] Other (comments)    Iron Other (comments)     GI UPSET AND DIZZINESS     Lyrica [Pregabalin] Drowsiness    Neosporin [Benzalkonium Chloride] Rash    Sulfa (Sulfonamide Antibiotics) Other (comments)         OBJECTIVE    Visit Vitals  BP (!) 150/70 (BP 1 Location: Right arm, BP Patient Position: Sitting)   Pulse 77   Temp 97.9 °F (36.6 °C) (Temporal)   Resp 18   Ht 5' 4\" (1.626 m)   Wt 154 lb 9.6 oz (70.1 kg)   SpO2 98%   BMI 26.54 kg/m²       Physical Exam  Vitals signs and nursing note reviewed. Constitutional:       General: She is not in acute distress. Appearance: She is not diaphoretic. Eyes:      Conjunctiva/sclera: Conjunctivae normal.      Pupils: Pupils are equal, round, and reactive to light. Cardiovascular:      Rate and Rhythm: Normal rate and regular rhythm. Heart sounds: Normal heart sounds. No murmur. No friction rub. No gallop. Pulmonary:      Effort: Pulmonary effort is normal. No respiratory distress. Breath sounds: Normal breath sounds. No wheezing. Skin:     General: Skin is warm and dry. Neurological:      Mental Status: She is alert. ASSESSMENT AND PLAN  Jana Bonner is a 80 y.o. female who presents today for:    1. Encounter for Medicare annual wellness exam    2. Encounter to establish care with new doctor    3. White coat syndrome with hypertension  Stable, continue current treatment. 4. Urinary urgency  R/o UTI.  - CULTURE, URINE; Future    5. Neuropathic postherpetic trigeminal neuralgia  Stable, continue current treatment. Medications Discontinued During This Encounter   Medication Reason    calcium carbonate (CALTRATE 437 PO) DUPLICATE ORDER    mv,Ca,min[facility]iron[facility] ACTIVE) 18 mg iron- 400 mcg-180 mg tab Therapy Completed       Follow-up and Dispositions    · Return in about 6 months (around 7/25/2021) for Medicare Wellness Visit (30 min). Treatment risks/benefits/costs/interactions/alternatives discussed with patient.   Advised patient to call back or return to office if symptoms worsen/change/persist. If patient cannot reach us or should anything more severe/urgent arise he/she should proceed directly to the nearest emergency department. Discussed expected course/resolution/complications of diagnosis in detail with patient. Patient expressed understanding with the diagnosis and plan. Jong Meadows M.D.

## 2021-01-25 NOTE — PROGRESS NOTES
This is the Subsequent Medicare Annual Wellness Exam, performed 12 months or more after the Initial AWV or the last Subsequent AWV    I have reviewed the patient's medical history in detail and updated the computerized patient record. Depression Risk Factor Screening:     3 most recent PHQ Screens 1/25/2021   Little interest or pleasure in doing things Not at all   Feeling down, depressed, irritable, or hopeless Not at all   Total Score PHQ 2 0       Alcohol Risk Screen    Do you average more than 1 drink per night or more than 7 drinks a week:  No    On any one occasion in the past three months have you have had more than 3 drinks containing alcohol:  No        Functional Ability and Level of Safety:    Hearing: Hearing is good. Activities of Daily Living: The home contains: handrails and grab bars  ADL Assessment 1/23/2020   Feeding yourself No Help Needed   Getting from bed to chair No Help Needed   Getting dressed No Help Needed   Bathing or showering No Help Needed   Walk across the room (includes cane/walker) No Help Needed   Using the telphone No Help Needed   Taking your medications No Help Needed   Preparing meals No Help Needed   Managing money (expenses/bills) No Help Needed   Moderately strenuous housework (laundry) No Help Needed   Shopping for personal items (toiletries/medicines) No Help Needed   Shopping for groceries No Help Needed   Driving No Help Needed   Climbing a flight of stairs No Help Needed   Getting to places beyond walking distances No Help Needed         Ambulation: with mild difficulty     Fall Risk:  Fall Risk Assessment, last 12 mths 7/23/2020   Able to walk? Yes   Fall in past 12 months?  No      Abuse Screen:  Patient is not abused       Cognitive Screening    Has your family/caregiver stated any concerns about your memory: no     Assessment/Plan   Education and counseling provided:  Are appropriate based on today's review and evaluation    Diagnoses and all orders for this visit:    1. Encounter for Medicare annual wellness exam    2. Encounter to establish care with new doctor    3. White coat syndrome with hypertension    4. Urinary urgency  -     CULTURE, URINE; Future    5.  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    Balance problem R26.89    Neuropathic postherpetic trigeminal neuralgia B02.22    Acquired hypothyroidism E03.9    HTN, goal below 150/90 I10    OAB (overactive bladder) N32.81    Excessive drinking alcohol F10.10    Osteopenia of multiple sites M85.89    Chronic renal disease, stage 3, moderately decreased glomerular filtration rate between 30-59 mL/min/1.73 square meter N18.30    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.062    Allergic rhinoconjunctivitis, seasonal and perennial J30.2, H10.10, J30.89    Iron deficiency anemia D50.9     Past Medical History:   Diagnosis Date    Acquired hypothyroidism 11/28/2017    Allergic rhinoconjunctivitis, seasonal and perennial 9/5/2019    Arthritis     Balance problem 11/28/2017    Excessive drinking alcohol 11/29/2017    History of hormone replacement therapy 1987    stopped    HTN, goal below 150/90 11/28/2017    Iron deficiency anemia 32/16/3015    Lichen planus     Oral    Neuropathic postherpetic trigeminal neuralgia 11/28/2017    OAB (overactive bladder) 11/29/2017    Osteopenia of multiple sites 2/2/2018    dexa 1/18, started fosamax    Spinal stenosis of lumbar region with neurogenic claudication 2/6/2019      Past Surgical History:   Procedure Laterality Date    HX BREAST BIOPSY Left 2014    Stereo    HX COLONOSCOPY  2012    HX CYST INCISION AND DRAINAGE Right 04/16/2018    benign    HX KNEE REPLACEMENT Right 2014    HX ROTATOR CUFF REPAIR Right 2001    IR INJ FORAMIN EPID LUMB ANES/STER SNGL  2/20/2019    IR INJ FORAMIN EPID LUMB ANES/STER SNGL  6/21/2019    IR INJ FORAMIN EPID LUMB ANES/STER SNGL  12/20/2019    IR INJ FORAMIN EPID LUMB ANES/STER SNGL  11/17/2020     Current Outpatient Medications   Medication Sig Dispense Refill    biotin 10,000 mcg cap Take  by mouth.  gabapentin (NEURONTIN) 100 mg capsule Take 2 Caps by mouth three (3) times daily. Max Daily Amount: 600 mg. Fax [facility] [phone] Cap 1    fluocinonide (LIDEX) 0.05 % topical gel Apply  to affected area two (2) times daily as needed (eczema). 30 g 1    alendronate (FOSAMAX) 70 mg tablet Take 1 Tab by mouth every seven (7) days. 13 Tab 3    levothyroxine (SYNTHROID) 50 mcg tablet TAKE 1 TABLET BY MOUTH EVERY DAY 90 Tab 1    acyclovir (ZOVIRAX) 800 mg tablet Take 1 Tab by mouth two (2) times a day. 180 Tab 1    lidocaine (XYLOCAINE) 2 % solution Take 15 mL by mouth every three (3) hours as needed for Pain. Swish and spit 1 Bottle 3    lisinopril (PRINIVIL, ZESTRIL) 2.5 mg tablet TAKE 1 TABLET BY MOUTH EVERY DAY 90 Tab 3    fexofenadine (ALLEGRA) 180 mg tablet Take 1 Tab by mouth nightly.  metroNIDAZOLE (METROGEL) 1 % topical gel Apply  to affected area daily. Use a thin layer to affected areas after washing for rosacea 45 g 3    oxybutynin (DITROPAN) 5 mg tablet Take 1 Tab by mouth daily as needed. 90 Tab 1    doxycycline (ADOXA) 100 mg tablet Take 100 mg by mouth two (2) times a day. AS NEEDED      CALCIUM CARB/VIT D2/MINERALS (CALTRATE PLUS PO) Take 500 mg by mouth daily.        Allergies   Allergen Reactions    Voltaren [Diclofenac Sodium] Anaphylaxis    [place] Other (comments)    Iron Other (comments)     GI UPSET AND DIZZINESS     Lyrica [Pregabalin] Drowsiness    Neosporin [Benzalkonium Chloride] Rash    Sulfa (Sulfonamide Antibiotics) Other (comments)       Family History   Problem Relation Age of Onset    Other Mother         severe hot flashes    Heart Failure Mother 76    Breast Cancer Mother 76    Other Daughter         severe hot flashes    Heart Attack Father 46    Breast Cancer Maternal Aunt      Social History     Tobacco Use    Smoking status: Former Smoker    Smokeless tobacco: Never Used   Substance Use Topics    Alcohol use: Yes     Alcohol/week: 14.0 standard drinks     Types: 8 Glasses of wine, 6 Shots of liquor per week     Comment: occ.

## 2021-01-25 NOTE — PROGRESS NOTES
Chief Complaint   Patient presents with   [address]     with new provider       1. Have you been to the ER, urgent care clinic since your last visit? Hospitalized since your last visit? No    2. Have you seen or consulted any other health care providers outside of the [address] [place] [address] since your last visit? Include any pap smears or colon screening.  No    3 most recent PHQ Screens 1/25/2021   Little interest or pleasure in doing things Not at all   Feeling down, depressed, irritable, or hopeless Not at all   Total Score PHQ 2 0

## 2021-01-27 ENCOUNTER — PATIENT MESSAGE (OUTPATIENT)
Dept: FAMILY MEDICINE CLINIC | Age: 86
End: 2021-01-27

## 2021-01-28 LAB
BACTERIA SPEC CULT: ABNORMAL
CC UR VC: ABNORMAL
SERVICE CMNT-IMP: ABNORMAL

## 2021-01-28 RX ORDER — NITROFURANTOIN 25; 75 MG/1; MG/1
100 CAPSULE ORAL 2 TIMES DAILY
Qty: 10 CAP | Refills: 0 | Status: SHIPPED | OUTPATIENT
Start: 2021-01-28 | End: 2021-02-02

## 2021-04-08 ENCOUNTER — OFFICE VISIT (OUTPATIENT)
Dept: FAMILY MEDICINE CLINIC | Age: 86
End: 2021-04-08
Payer: MEDICARE

## 2021-04-08 VITALS
TEMPERATURE: 97.7 F | OXYGEN SATURATION: 99 % | DIASTOLIC BLOOD PRESSURE: 82 MMHG | SYSTOLIC BLOOD PRESSURE: 160 MMHG | WEIGHT: 151.8 LBS | BODY MASS INDEX: 25.92 KG/M2 | HEIGHT: 64 IN | HEART RATE: 90 BPM | RESPIRATION RATE: 18 BRPM

## 2021-04-08 DIAGNOSIS — I10 WHITE COAT SYNDROME WITH HYPERTENSION: ICD-10-CM

## 2021-04-08 DIAGNOSIS — M48.062 SPINAL STENOSIS OF LUMBAR REGION WITH NEUROGENIC CLAUDICATION: Primary | ICD-10-CM

## 2021-04-08 PROCEDURE — 99213 OFFICE O/P EST LOW 20 MIN: CPT | Performed by: FAMILY MEDICINE

## 2021-04-08 PROCEDURE — 1101F PT FALLS ASSESS-DOCD LE1/YR: CPT | Performed by: FAMILY MEDICINE

## 2021-04-08 PROCEDURE — G0463 HOSPITAL OUTPT CLINIC VISIT: HCPCS | Performed by: FAMILY MEDICINE

## 2021-04-08 PROCEDURE — G8510 SCR DEP NEG, NO PLAN REQD: HCPCS | Performed by: FAMILY MEDICINE

## 2021-04-08 PROCEDURE — G8536 NO DOC ELDER MAL SCRN: HCPCS | Performed by: FAMILY MEDICINE

## 2021-04-08 PROCEDURE — G8427 DOCREV CUR MEDS BY ELIG CLIN: HCPCS | Performed by: FAMILY MEDICINE

## 2021-04-08 PROCEDURE — G8419 CALC BMI OUT NRM PARAM NOF/U: HCPCS | Performed by: FAMILY MEDICINE

## 2021-04-08 PROCEDURE — 1090F PRES/ABSN URINE INCON ASSESS: CPT | Performed by: FAMILY MEDICINE

## 2021-04-08 RX ORDER — OXYBUTYNIN CHLORIDE 5 MG/1
5 TABLET, EXTENDED RELEASE ORAL DAILY
COMMUNITY
End: 2021-04-08

## 2021-04-08 NOTE — PROGRESS NOTES
Identified pt with two pt identifiers(name and ). Reviewed record in preparation for visit and have obtained necessary documentation. Chief Complaint   Patient presents with    Annual Wellness Visit        Vitals:    21 1551   Weight: 151 lb 12.8 oz (68.9 kg)   Height: 5' 4\" (1.626 m)   PainSc:   4   PainLoc: Leg       There are no preventive care reminders to display for this patient. Coordination of Care Questionnaire:  :   1) Have you been to an emergency room, urgent care, or hospitalized since your last visit? If yes, where when, and reason for visit? no       2. Have seen or consulted any other health care provider since your last visit? If yes, where when, and reason for visit? NO      Patient is accompanied by self I have received verbal consent from Afia Gonsales to discuss any/all medical information while they are present in the room.

## 2021-04-08 NOTE — PROGRESS NOTES
Patient Name: Brad Urban   MRN: [id number]    Kala Gavin is a 80 y.o. female who presents with the following:     Is a history of high blood pressure as well as some whitecoat hypertension. Home BP values have been normal.  Hx of chronic low back pain with radiculopathy due to DJD and spinal stenosis. Has been getting IR to place epidural steroid injections about every 6 months to one year; her prior PCP did order this for her. Has not seen orthopedics for her back in the past but sees them for her shoulder. Uses a cane for balance. Usually exercises several times a week using an exercise bike. BP Readings from Last 3 Encounters:   04/08/21 (!) 160/82   01/25/21 (!) 150/70   11/19/20 (!) 164/63     Wt Readings from Last 3 Encounters:   04/08/21 151 lb 12.8 oz (68.9 kg)   01/25/21 154 lb 9.6 oz (70.1 kg)   11/19/20 156 lb (70.8 kg)       Review of Systems   Constitutional: Negative for fever, malaise/fatigue and weight loss. Respiratory: Negative for cough, hemoptysis, shortness of breath and wheezing. Cardiovascular: Negative for chest pain, palpitations, leg swelling and PND. Gastrointestinal: Negative for abdominal pain, constipation, diarrhea, nausea and vomiting. Musculoskeletal: Positive for back pain. The patient's medications, allergies, past medical history, surgical history, family history and social history were reviewed and updated where appropriate. Prior to Admission medications    Medication Sig Start Date End Date Taking? Authorizing Provider   levothyroxine (SYNTHROID) 50 mcg tablet TAKE 1 TABLET BY MOUTH EVERY DAY 2/16/21  Yes Jassi Sommer MD   lisinopriL (PRINIVIL, ZESTRIL) 2.5 mg tablet TAKE 1 TABLET BY MOUTH EVERY DAY 2/16/21  Yes Jassi Sommer MD   biotin 10,000 mcg cap Take  by mouth. Yes Provider, Historical   gabapentin (NEURONTIN) 100 mg capsule Take 2 Caps by mouth three (3) times daily. Max Daily Amount: 600 mg.  Fax Anjali [phone] 12/29/20  Yes Gómez Stewart MD   alendronate (FOSAMAX) 70 mg tablet Take 1 Tab by mouth every seven (7) days. 9/28/20  Yes Gómez Stewart MD   fexofenadine (ALLEGRA) 180 mg tablet Take 1 Tab by mouth nightly. 1/23/20  Yes Gómez Stewart MD   oxybutynin (DITROPAN) 5 mg tablet Take 1 Tab by mouth daily as needed. 7/19/18  Yes Gómez Stewart MD   doxycycline (ADOXA) 100 mg tablet Take 100 mg by mouth two (2) times a day. AS NEEDED   Yes Provider, Historical   CALCIUM CARB/VIT D2/MINERALS (CALTRATE PLUS PO) Take 500 mg by mouth daily. Yes Provider, Historical   oxybutynin chloride XL (DITROPAN XL) 5 mg CR tablet Take 5 mg by mouth daily. 4/8/21  Provider, Historical   fluocinonide (LIDEX) 0.05 % topical gel Apply  to affected area two (2) times daily as needed (eczema). 11/19/20 4/8/21  Gómez Stewart MD   acyclovir (ZOVIRAX) 800 mg tablet Take 1 Tab by mouth two (2) times a day. 5/9/20   Gómez Stewart MD   lidocaine (XYLOCAINE) 2 % solution Take 15 mL by mouth every three (3) hours as needed for Pain. Swish and spit 4/5/20 4/8/21  Gómez Stewart MD   metroNIDAZOLE (METROGEL) 1 % topical gel Apply  to affected area daily. Use a thin layer to affected areas after washing for rosacea 1/23/20 4/8/21  Gómez Stewart MD       Allergies   Allergen Reactions    Voltaren [Diclofenac Sodium] Anaphylaxis    [place] Other (comments)    Iron Other (comments)     GI UPSET AND DIZZINESS     Lyrica [Pregabalin] Drowsiness    Neosporin [Benzalkonium Chloride] Rash    Sulfa (Sulfonamide Antibiotics) Other (comments)         OBJECTIVE    Visit Vitals  BP (!) 160/82 (BP 1 Location: Right arm, BP Patient Position: Sitting, BP Cuff Size: Adult)   Pulse 90   Temp 97.7 °F (36.5 °C) (Temporal)   Resp 18   Ht 5' 4\" (1.626 m)   Wt 151 lb 12.8 oz (68.9 kg)   SpO2 99%   BMI 26.06 kg/m²       Physical Exam  Vitals signs and nursing note reviewed.    Constitutional: General: She is not in acute distress. Appearance: Normal appearance. She is not toxic-appearing. HENT:      Head: Normocephalic and atraumatic. Eyes:      Pupils: Pupils are equal, round, and reactive to light. Pulmonary:      Effort: Pulmonary effort is normal.   Musculoskeletal: Normal range of motion. Skin:     General: Skin is warm and dry. Neurological:      Mental Status: She is alert. Mental status is at baseline. Psychiatric:         Mood and Affect: Mood normal.         Behavior: Behavior normal.           ASSESSMENT AND PLAN  Tony Purcell is a 80 y.o. female who presents today for:    1. Spinal stenosis of lumbar region with neurogenic claudication  Recommend pt to see orthopedics/interventional spine specialists for her back pain management.  - REFERRAL TO ORTHOPEDICS    2. White coat syndrome with hypertension  Home records are wnl. No med changes. Medications Discontinued During This Encounter   Medication Reason    fluocinonide (LIDEX) 0.05 % topical gel LIST CLEANUP    lidocaine (XYLOCAINE) 2 % solution LIST CLEANUP    metroNIDAZOLE (METROGEL) 1 % topical gel LIST CLEANUP    oxybutynin chloride XL (DITROPAN XL) 5 mg CR tablet LIST CLEANUP    acyclovir (ZOVIRAX) 800 mg tablet LIST CLEANUP     Follow-up and Dispositions    · Return in about 3 months (around 7/8/2021) for HTN follow up. Treatment risks/benefits/costs/interactions/alternatives discussed with patient. Advised patient to call back or return to office if symptoms worsen/change/persist. If patient cannot reach us or should anything more severe/urgent arise he/she should proceed directly to the nearest emergency department. Discussed expected course/resolution/complications of diagnosis in detail with patient. Patient expressed understanding with the diagnosis and plan. Jong Luis M.D.

## 2021-05-18 ENCOUNTER — OFFICE VISIT (OUTPATIENT)
Dept: FAMILY MEDICINE CLINIC | Age: 86
End: 2021-05-18
Payer: MEDICARE

## 2021-05-18 VITALS
WEIGHT: 151 LBS | SYSTOLIC BLOOD PRESSURE: 137 MMHG | HEIGHT: 65 IN | TEMPERATURE: 97.5 F | HEART RATE: 82 BPM | BODY MASS INDEX: 25.16 KG/M2 | OXYGEN SATURATION: 99 % | DIASTOLIC BLOOD PRESSURE: 74 MMHG

## 2021-05-18 DIAGNOSIS — R39.9 UTI SYMPTOMS: Primary | ICD-10-CM

## 2021-05-18 DIAGNOSIS — R39.15 URGENCY OF URINATION: ICD-10-CM

## 2021-05-18 DIAGNOSIS — E86.0 MILD DEHYDRATION: ICD-10-CM

## 2021-05-18 DIAGNOSIS — R82.4 URINE KETONE: ICD-10-CM

## 2021-05-18 DIAGNOSIS — R30.9 URINARY PAIN: ICD-10-CM

## 2021-05-18 DIAGNOSIS — N32.81 OAB (OVERACTIVE BLADDER): ICD-10-CM

## 2021-05-18 LAB
BILIRUB UR QL STRIP: NEGATIVE
GLUCOSE UR-MCNC: NEGATIVE MG/DL
KETONES P FAST UR STRIP-MCNC: NORMAL MG/DL
PH UR STRIP: 6.5 [PH] (ref 4.6–8)
PROT UR QL STRIP: NEGATIVE
SP GR UR STRIP: 1.02 (ref 1–1.03)
UA UROBILINOGEN AMB POC: NORMAL (ref 0.2–1)
URINALYSIS CLARITY POC: NORMAL
URINALYSIS COLOR POC: YELLOW
URINE BLOOD POC: NEGATIVE
URINE LEUKOCYTES POC: NORMAL
URINE NITRITES POC: NEGATIVE

## 2021-05-18 PROCEDURE — 1101F PT FALLS ASSESS-DOCD LE1/YR: CPT | Performed by: FAMILY MEDICINE

## 2021-05-18 PROCEDURE — G8419 CALC BMI OUT NRM PARAM NOF/U: HCPCS | Performed by: FAMILY MEDICINE

## 2021-05-18 PROCEDURE — G8428 CUR MEDS NOT DOCUMENT: HCPCS | Performed by: FAMILY MEDICINE

## 2021-05-18 PROCEDURE — 99213 OFFICE O/P EST LOW 20 MIN: CPT | Performed by: FAMILY MEDICINE

## 2021-05-18 PROCEDURE — G8510 SCR DEP NEG, NO PLAN REQD: HCPCS | Performed by: FAMILY MEDICINE

## 2021-05-18 PROCEDURE — G0463 HOSPITAL OUTPT CLINIC VISIT: HCPCS | Performed by: FAMILY MEDICINE

## 2021-05-18 PROCEDURE — G8536 NO DOC ELDER MAL SCRN: HCPCS | Performed by: FAMILY MEDICINE

## 2021-05-18 PROCEDURE — 81003 URINALYSIS AUTO W/O SCOPE: CPT | Performed by: FAMILY MEDICINE

## 2021-05-18 PROCEDURE — 1090F PRES/ABSN URINE INCON ASSESS: CPT | Performed by: FAMILY MEDICINE

## 2021-05-18 PROCEDURE — G0444 DEPRESSION SCREEN ANNUAL: HCPCS | Performed by: FAMILY MEDICINE

## 2021-05-18 RX ORDER — CEPHALEXIN 500 MG/1
500 CAPSULE ORAL 4 TIMES DAILY
Qty: 20 CAP | Refills: 0 | Status: SHIPPED | OUTPATIENT
Start: 2021-05-18 | End: 2021-05-23

## 2021-05-18 NOTE — PATIENT INSTRUCTIONS
Oral Rehydration: Care Instructions Your Care Instructions Dehydration occurs when your body loses too much water. This can happen if you do not drink enough fluids or lose a lot of fluid due to diarrhea, vomiting, or sweating. Being dehydrated can cause health problems and can even be life-threatening. To replace lost fluids, you need to drink liquid that contains special chemicals called electrolytes. Electrolytes keep your body working well. Plain water does not have electrolytes. You also need to rest to prevent more fluid loss. Replacing water and electrolytes (oral rehydration) completely takes about 36 hours. But you should feel better within a few hours. Follow-up care is a key part of your treatment and safety. Be sure to make and go to all appointments, and call your doctor if you are having problems. It's also a good idea to know your test results and keep a list of the medicines you take. How can you care for yourself at home? · Take frequent sips of a drink such as Gatorade, Powerade, or other rehydration drinks that your doctor suggests. These replace both fluid and important chemicals (electrolytes) you need for balance in your blood. · Drink 2 quarts of cool liquid over 2 to 4 hours. You should have at least 10 glasses of liquid a day to replace lost fluid. If you have kidney, heart, or liver disease and have to limit fluids, talk with your doctor before you increase the amount of fluids you drink. · Make your own drink. Measure everything carefully. The drink may not work well or may even be harmful if the amounts are off. ? 1 quart water ? ½ teaspoon salt ? 6 teaspoons sugar · Do not drink liquid with caffeine, such as coffee and shreya. · Do not drink any alcohol. It can make you dehydrated. · Drink plenty of fluids. If you have kidney, heart, or liver disease and have to limit fluids, talk with your doctor before you increase the amount of fluids you drink.  
When should you call for help? Call 911 anytime you think you may need emergency care. For example, call if: 
  · You have signs of severe dehydration, such as: 
? You are confused or unable to stay awake. 
? You passed out (lost consciousness). Call your doctor now or seek immediate medical care if: 
  · You still have signs of dehydration. You have sunken eyes, a dry mouth, and pass only a little urine.  
  · You are dizzy or lightheaded, or you feel like you may faint.  
  · You are not able to keep down fluids. Watch closely for changes in your health, and be sure to contact your doctor if: 
  · You do not get better as expected. Where can you learn more? Go to http://www.gray.com/ Enter I040 in the search box to learn more about \"Oral Rehydration: Care Instructions. \" Current as of: February 26, 2020               Content Version: 12.8 © 2006-2021 [employer]. Care instructions adapted under license by [employer] (which disclaims liability or warranty for this information). If you have questions about a medical condition or this instruction, always ask your healthcare professional. [place] [address] any warranty or liability for your use of this information. Urinary Tract Infection (UTI) in Women: Care Instructions Overview A urinary tract infection, or UTI, is a general term for an infection anywhere between the kidneys and the urethra (where urine comes out). Most UTIs are bladder infections. They often cause pain or burning when you urinate. UTIs are caused by bacteria and can be cured with antibiotics. Be sure to complete your treatment so that the infection does not get worse. Follow-up care is a key part of your treatment and safety. Be sure to make and go to all appointments, and call your doctor if you are having problems. It's also a good idea to know your test results and keep a list of the medicines you take.  
How can you care for yourself at home? · Take your antibiotics as directed. Do not stop taking them just because you feel better. You need to take the full course of antibiotics. · Drink extra water and other fluids for the next day or two. This will help make the urine less concentrated and help wash out the bacteria that are causing the infection. (If you have kidney, heart, or liver disease and have to limit fluids, talk with your doctor before you increase the amount of fluids you drink.) · Avoid drinks that are carbonated or have caffeine. They can irritate the bladder. · Urinate often. Try to empty your bladder each time. · To relieve pain, take a hot bath or lay a heating pad set on low over your lower belly or genital area. Never go to sleep with a heating pad in place. To prevent UTIs · Drink plenty of water each day. This helps you urinate often, which clears bacteria from your system. (If you have kidney, heart, or liver disease and have to limit fluids, talk with your doctor before you increase the amount of fluids you drink.) · Urinate when you need to. · If you are sexually active, urinate right after you have sex. · Change sanitary pads often. · Avoid douches, bubble baths, feminine hygiene sprays, and other feminine hygiene products that have deodorants. · After going to the bathroom, wipe from front to back. When should you call for help? Call your doctor now or seek immediate medical care if: 
  · Symptoms such as fever, chills, nausea, or vomiting get worse or appear for the first time.  
  · You have new pain in your back just below your rib cage. This is called flank pain.  
  · There is new blood or pus in your urine.  
  · You have any problems with your antibiotic medicine. Watch closely for changes in your health, and be sure to contact your doctor if: 
  · You are not getting better after taking an antibiotic for 2 days.  
  · Your symptoms go away but then come back.   
Where can you learn more? Go to http://www.gray.com/ Enter W669 in the search box to learn more about \"Urinary Tract Infection (UTI) in Women: Care Instructions. \" Current as of: June 29, 2020               Content Version: 12.8 © 3342-9128 [employer], [employer]. Care instructions adapted under license by [employer] (which disclaims liability or warranty for this information). If you have questions about a medical condition or this instruction, always ask your healthcare professional. [address] any warranty or liability for your use of this information.

## 2021-05-18 NOTE — PROGRESS NOTES
1. Have you been to the ER, urgent care clinic since your last visit? Hospitalized since your last visit? No    2. Have you seen or consulted any other health care providers outside of the [address] [place] [address] since your last visit? Include any pap smears or colon screening. Dr Alpa Mckeon;     Chief Complaint   Patient presents with    New Patient     usually sees Dr Lacey Feliciano, Symptoms of Cystitis, takes AZO; Has urinary urgency;         Visit Vitals  /74 (BP 1 Location: Left upper arm, BP Patient Position: Sitting)   Pulse 82   Temp 97.5 °F (36.4 °C) (Oral)   Ht 5' 5\" (1.651 m)   Wt 151 lb (68.5 kg)   SpO2 99%   BMI 25.13 kg/m²

## 2021-05-18 NOTE — PROGRESS NOTES
[facility] Note    Assessment/ Plan:   Diagnoses and all orders for this visit:    1. UTI symptoms  -     cephALEXin (KEFLEX) 500 mg capsule; Take 1 Cap by mouth four (4) times daily for 5 days. -     CULTURE, URINE; Future    2. Urgency of urination  -     cephALEXin (KEFLEX) 500 mg capsule; Take 1 Cap by mouth four (4) times daily for 5 days. -     CULTURE, URINE; Future    3. Mild dehydration    4. Urine ketone      Recommendations based on history, physical exam and review of pertinent labs, studies and medications:   Acute urinary urgency in setting of OAB currently not treated. Empiric treatment of UTI given leuks positive in urine but concern symptoms more consistent with recurrent UTI and overactive bladder and discussed with patient. Discussed need for  follow up with urologist.  Noted recent UTI in 1/2021 treated with Mack Linen in urine with suboptimall water intake suggest mild dehydration. Educated on oral rehydration. Follow up with specialists per routine. Educated patient on red flag symptoms to warrant return to clinic or emergency room visit. I have discussed the diagnosis with the patient and the intended plan as seen in the above orders. The patient has been offered or received an after-visit summary and questions were answered concerning future plans. I have discussed medication side effects and warnings with the patient as well. Follow-up and Dispositions    · Return if symptoms worsen or fail to improve. Subjective:     Chief Complaint   Patient presents with    New Patient     usually sees Dr Homero Fuller, Symptoms of Cystitis, takes AZO; Has urinary urgency;       Ulysses Seed is a 80y.o. year old female who presents for evaluation of the following:    Urinary Urgency  Onset:  3 weeks ago  Character: urinary urgency with small void amount  Treatment: azo  Endorses history of overactive bladder and kidney failure  - allergy to sulfa drugs   Noted recent UTI in 1/2021 treated with Macrobid and empiric treatment in 9.202 based on symptoms. Denies new back pain, hematuria, urinary frequency, fever      Review of Systems   Pertinent positives and negative per HPI. All other systems  reviewed are negative for a Comprehensive ROS (10+). Past medical history, social history, family history reviewed. Medications reconciled. Objective:     Vitals:    05/18/21 0930   BP: 137/74   Pulse: 82   Temp: 97.5 °F (36.4 °C)   TempSrc: Oral   SpO2: 99%   Weight: 151 lb (68.5 kg)   Height: 5' 5\" (1.651 m)       Physical Examination:  General: Alert, cooperative, no distress, appears stated age. Eyes: Conjunctivae clear. Pupils equally round and reactive to light, Extraocular muscles intact. Ears: Normal external ear canals both ears. Nose: Nares normal. Septum midline. Mucosa normal. No drainage or sinus tenderness. Mouth/Throat: Lips, mucosa, and tongue normal. No oropharyngeal erythema. No tonsillar enlargement or exudate. Neck: Supple, symmetrical, trachea midline, no adenopathy. No thyroid enlargement/tenderness/nodules  Respiratory: Breathing comfortably, in no acute respiratory distress. Clear to auscultation bilaterally. Normal inspiratory and expiratory ratio. Cardiovascular: Regular rate and rhythm, S1, S2 normal, no murmur, click, rub or gallop.   -Extremities no edema  Abdomen: Soft, non-tender, not distended. Bowel sounds normal. No masses or organomegaly. MSK: Extremities normal appearing, atraumatic, no effusion. Gait assisted by cane. - No Costovertebral angle tenderness. Skin: Skin color, texture, turgor normal. No rashes or lesions on exposed skin. Lymph nodes: Cervical, supraclavicular nodes normal.  Neurologic: Cranial nerves II-XII intact.   Psychiatric: Affect appropriate      Signed,    Declan Martinez MD  5/18/2021

## 2021-05-20 LAB
BACTERIA SPEC CULT: ABNORMAL
CC UR VC: ABNORMAL
SERVICE CMNT-IMP: ABNORMAL

## 2021-05-21 NOTE — PROGRESS NOTES
Your urine testing confirmed a bacterial bladder infection. The medication that was prescribed should clear this infection.  Mychart result comment sent

## 2021-07-08 ENCOUNTER — OFFICE VISIT (OUTPATIENT)
Dept: FAMILY MEDICINE CLINIC | Age: 86
End: 2021-07-08
Payer: MEDICARE

## 2021-07-08 VITALS
TEMPERATURE: 98.9 F | HEIGHT: 65 IN | HEART RATE: 89 BPM | OXYGEN SATURATION: 98 % | RESPIRATION RATE: 18 BRPM | BODY MASS INDEX: 24.99 KG/M2 | SYSTOLIC BLOOD PRESSURE: 127 MMHG | WEIGHT: 150 LBS | DIASTOLIC BLOOD PRESSURE: 72 MMHG

## 2021-07-08 DIAGNOSIS — B02.22 NEUROPATHIC POSTHERPETIC TRIGEMINAL NEURALGIA: ICD-10-CM

## 2021-07-08 DIAGNOSIS — Z87.440 HISTORY OF UTI: ICD-10-CM

## 2021-07-08 DIAGNOSIS — I10 HTN, GOAL BELOW 150/90: Primary | ICD-10-CM

## 2021-07-08 DIAGNOSIS — Z13.220 SCREENING FOR LIPID DISORDERS: ICD-10-CM

## 2021-07-08 DIAGNOSIS — M85.89 OSTEOPENIA OF MULTIPLE SITES: ICD-10-CM

## 2021-07-08 DIAGNOSIS — E03.9 ACQUIRED HYPOTHYROIDISM: ICD-10-CM

## 2021-07-08 LAB
25(OH)D3 SERPL-MCNC: 22.6 NG/ML (ref 30–100)
ERYTHROCYTE [DISTWIDTH] IN BLOOD BY AUTOMATED COUNT: 14.2 % (ref 11.5–14.5)
HCT VFR BLD AUTO: 47.5 % (ref 35–47)
HGB BLD-MCNC: 15.7 G/DL (ref 11.5–16)
MCH RBC QN AUTO: 31.3 PG (ref 26–34)
MCHC RBC AUTO-ENTMCNC: 33.1 G/DL (ref 30–36.5)
MCV RBC AUTO: 94.8 FL (ref 80–99)
NRBC # BLD: 0 K/UL (ref 0–0.01)
NRBC BLD-RTO: 0 PER 100 WBC
PLATELET # BLD AUTO: 283 K/UL (ref 150–400)
PMV BLD AUTO: 10.4 FL (ref 8.9–12.9)
RBC # BLD AUTO: 5.01 M/UL (ref 3.8–5.2)
T4 SERPL-MCNC: 9.9 UG/DL (ref 4.8–13.9)
TSH SERPL DL<=0.05 MIU/L-ACNC: 1.76 UIU/ML (ref 0.36–3.74)
WBC # BLD AUTO: 6.2 K/UL (ref 3.6–11)

## 2021-07-08 PROCEDURE — 99214 OFFICE O/P EST MOD 30 MIN: CPT | Performed by: FAMILY MEDICINE

## 2021-07-08 PROCEDURE — G8432 DEP SCR NOT DOC, RNG: HCPCS | Performed by: FAMILY MEDICINE

## 2021-07-08 PROCEDURE — G8420 CALC BMI NORM PARAMETERS: HCPCS | Performed by: FAMILY MEDICINE

## 2021-07-08 PROCEDURE — G8427 DOCREV CUR MEDS BY ELIG CLIN: HCPCS | Performed by: FAMILY MEDICINE

## 2021-07-08 PROCEDURE — 1101F PT FALLS ASSESS-DOCD LE1/YR: CPT | Performed by: FAMILY MEDICINE

## 2021-07-08 PROCEDURE — 1090F PRES/ABSN URINE INCON ASSESS: CPT | Performed by: FAMILY MEDICINE

## 2021-07-08 PROCEDURE — G0463 HOSPITAL OUTPT CLINIC VISIT: HCPCS | Performed by: FAMILY MEDICINE

## 2021-07-08 PROCEDURE — G8536 NO DOC ELDER MAL SCRN: HCPCS | Performed by: FAMILY MEDICINE

## 2021-07-08 RX ORDER — GABAPENTIN 100 MG/1
200 CAPSULE ORAL 3 TIMES DAILY
Qty: 180 CAPSULE | Refills: 2 | Status: SHIPPED | OUTPATIENT
Start: 2021-07-08 | End: 2021-10-18 | Stop reason: SDUPTHER

## 2021-07-08 RX ORDER — ALENDRONATE SODIUM 70 MG/1
70 TABLET ORAL
Qty: 13 TABLET | Refills: 3 | Status: SHIPPED | OUTPATIENT
Start: 2021-07-08 | End: 2022-06-24 | Stop reason: SDUPTHER

## 2021-07-08 NOTE — PROGRESS NOTES
Chief Complaint   Patient presents with    Hypertension    Thyroid Problem     1. Have you been to the ER, urgent care clinic since your last visit? Hospitalized since your last visit? No    2. Have you seen or consulted any other health care providers outside of the [address] [place] [address] since your last visit? Include any pap smears or colon screening.  No

## 2021-07-08 NOTE — PROGRESS NOTES
Patient Name: Jony Mills   MRN: [id number]    Jennyfer Pickett is a 80 y.o. female who presents with the following:     Had a UTI in May; symptoms are better but wants to recheck. Feels drowsy after her AM dose of gabapentin. Takes 200 mg TID for trigeminal neuralgia. Seeing ortho for trigger finger and shoulder pain. Doing PT and uses CBD oil products which help. Due for HCM labs. Review of Systems   Constitutional: Negative for fever, malaise/fatigue and weight loss. Respiratory: Negative for cough, hemoptysis, shortness of breath and wheezing. Cardiovascular: Negative for chest pain, palpitations, leg swelling and PND. Gastrointestinal: Negative for abdominal pain, constipation, diarrhea, nausea and vomiting. The patient's medications, allergies, past medical history, surgical history, family history and social history were reviewed and updated where appropriate. Prior to Admission medications    Medication Sig Start Date End Date Taking? Authorizing Provider   levothyroxine (SYNTHROID) 50 mcg tablet TAKE 1 TABLET BY MOUTH EVERY DAY 2/16/21  Yes Noemí Tse MD   lisinopriL (PRINIVIL, ZESTRIL) 2.5 mg tablet TAKE 1 TABLET BY MOUTH EVERY DAY 2/16/21  Yes Noemí Tse MD   biotin 10,000 mcg cap Take  by mouth. Yes Provider, Historical   gabapentin (NEURONTIN) 100 mg capsule Take 2 Caps by mouth three (3) times daily. Max Daily Amount: 600 mg. Fax [facility] [phone] 12/29/20  Yes Robert Michelle MD   alendronate (FOSAMAX) 70 mg tablet Take 1 Tab by mouth every seven (7) days. 9/28/20  Yes Dano Stewart MD   fexofenadine (ALLEGRA) 180 mg tablet Take 1 Tab by mouth nightly. 1/23/20  Yes Dano Stewart MD   oxybutynin (DITROPAN) 5 mg tablet Take 1 Tab by mouth daily as needed. 7/19/18  Yes Dano Stewart MD   CALCIUM CARB/VIT D2/MINERALS (CALTRATE PLUS PO) Take 500 mg by mouth daily.    Yes Provider, Historical   doxycycline (ADOXA) 100 mg tablet Take 100 mg by mouth two (2) times a day. AS NEEDED  Patient not taking: Reported on 7/8/2021 7/8/21  Provider, Historical       Allergies   Allergen Reactions    Voltaren [Diclofenac Sodium] Anaphylaxis    [place] Other (comments)    Iron Other (comments)     GI UPSET AND DIZZINESS     Lyrica [Pregabalin] Drowsiness    Neosporin [Benzalkonium Chloride] Rash    Sulfa (Sulfonamide Antibiotics) Other (comments)       OBJECTIVE    Visit Vitals  /72 (BP 1 Location: Left upper arm, BP Patient Position: Sitting, BP Cuff Size: Adult)   Pulse 89   Temp 98.9 °F (37.2 °C) (Temporal)   Resp 18   Ht 5' 5\" (1.651 m)   Wt 150 lb (68 kg)   SpO2 98%   BMI 24.96 kg/m²       Physical Exam  Vitals and nursing note reviewed. Constitutional:       General: She is not in acute distress. Appearance: Normal appearance. She is not toxic-appearing. HENT:      Head: Normocephalic and atraumatic. Eyes:      Pupils: Pupils are equal, round, and reactive to light. Pulmonary:      Effort: Pulmonary effort is normal.   Musculoskeletal:         General: Normal range of motion. Skin:     General: Skin is warm and dry. Neurological:      Mental Status: She is alert. Mental status is at baseline. Psychiatric:         Mood and Affect: Mood normal.         Behavior: Behavior normal.           ASSESSMENT AND PLAN  Mark Wahl is a 80 y.o. female who presents today for:    1. HTN, goal below 150/90  Stable, continue current treatment. 2. Acquired hypothyroidism  - TSH 3RD GENERATION; Future  - T4 (THYROXINE); Future  - T4 (THYROXINE)  - TSH 3RD GENERATION    3. Osteopenia of multiple sites  - VITAMIN D, 25 HYDROXY; Future  - VITAMIN D, 25 HYDROXY  - alendronate (FOSAMAX) 70 mg tablet; Take 1 Tablet by mouth every seven (7) days. Dispense: 13 Tablet; Refill: 3    4. History of UTI  - CULTURE, URINE; Future  - CULTURE, URINE    5. Screening for lipid disorders  - CBC W/O DIFF;  Future  - METABOLIC PANEL, COMPREHENSIVE; Future  - LIPID PANEL; Future  - LIPID PANEL  - METABOLIC PANEL, COMPREHENSIVE  - CBC W/O DIFF    6. Neuropathic postherpetic trigeminal neuralgia  Advised to decrease AM dose to 1 tab and keep other doses the same; pt asking to keep Rx the same in case she needs to go back up to 2 tabs TID.  - gabapentin (NEURONTIN) 100 mg capsule; Take 2 Capsules by mouth three (3) times daily. Max Daily Amount: 600 mg. Fax Anjali [phone]  Dispense: 180 Capsule; Refill: 2       Medications Discontinued During This Encounter   Medication Reason    doxycycline (ADOXA) 100 mg tablet LIST CLEANUP    alendronate (FOSAMAX) 70 mg tablet REORDER    gabapentin (NEURONTIN) 100 mg capsule REORDER          Follow-up and Dispositions    · Return in about 6 months (around 1/8/2022) for Medicare Wellness Visit (30 min). Treatment risks/benefits/costs/interactions/alternatives discussed with patient. Advised patient to call back or return to office if symptoms worsen/change/persist. If patient cannot reach us or should anything more severe/urgent arise he/she should proceed directly to the nearest emergency department. Discussed expected course/resolution/complications of diagnosis in detail with patient. Patient expressed understanding with the diagnosis and plan. Jong Davis M.D.

## 2021-07-09 LAB
ALBUMIN SERPL-MCNC: 4 G/DL (ref 3.5–5)
ALBUMIN/GLOB SERPL: 1.2 {RATIO} (ref 1.1–2.2)
ALP SERPL-CCNC: 83 U/L (ref 45–117)
ALT SERPL-CCNC: 15 U/L (ref 12–78)
ANION GAP SERPL CALC-SCNC: 9 MMOL/L (ref 5–15)
AST SERPL-CCNC: 22 U/L (ref 15–37)
BILIRUB SERPL-MCNC: 0.8 MG/DL (ref 0.2–1)
BUN SERPL-MCNC: 16 MG/DL (ref 6–20)
BUN/CREAT SERPL: 18 (ref 12–20)
CALCIUM SERPL-MCNC: 9.6 MG/DL (ref 8.5–10.1)
CHLORIDE SERPL-SCNC: 105 MMOL/L (ref 97–108)
CHOLEST SERPL-MCNC: 273 MG/DL
CO2 SERPL-SCNC: 23 MMOL/L (ref 21–32)
CREAT SERPL-MCNC: 0.91 MG/DL (ref 0.55–1.02)
GLOBULIN SER CALC-MCNC: 3.3 G/DL (ref 2–4)
GLUCOSE SERPL-MCNC: 83 MG/DL (ref 65–100)
HDLC SERPL-MCNC: 80 MG/DL
HDLC SERPL: 3.4 {RATIO} (ref 0–5)
LDLC SERPL CALC-MCNC: 176.2 MG/DL (ref 0–100)
POTASSIUM SERPL-SCNC: 5.4 MMOL/L (ref 3.5–5.1)
PROT SERPL-MCNC: 7.3 G/DL (ref 6.4–8.2)
SODIUM SERPL-SCNC: 137 MMOL/L (ref 136–145)
TRIGL SERPL-MCNC: 84 MG/DL (ref ?–150)
VLDLC SERPL CALC-MCNC: 16.8 MG/DL

## 2021-07-10 LAB
BACTERIA SPEC CULT: ABNORMAL
CC UR VC: ABNORMAL
SERVICE CMNT-IMP: ABNORMAL

## 2021-07-13 RX ORDER — NITROFURANTOIN 25; 75 MG/1; MG/1
100 CAPSULE ORAL 2 TIMES DAILY
Qty: 10 CAPSULE | Refills: 0 | Status: SHIPPED | OUTPATIENT
Start: 2021-07-13 | End: 2021-07-18

## 2021-07-13 RX ORDER — ASPIRIN 325 MG
TABLET, DELAYED RELEASE (ENTERIC COATED) ORAL
Qty: 8 CAPSULE | Refills: 0 | Status: SHIPPED | OUTPATIENT
Start: 2021-07-13 | End: 2022-02-14 | Stop reason: ALTCHOICE

## 2021-07-13 NOTE — PROGRESS NOTES
Dear Ms. Jones,    I wanted to follow up on your recent test results: Your UTI is still present; I have sent in a different antibiotic to your pharmacy. I will also have my nurse call you to make sure you get this message. Cholesterol is high. I would encourage healthy food choices and regular exercise before starting medications for this. Vitamin D levels are low. I have sent in a prescription for high dose vitamin D3 at 50,000 units once a week for 8 weeks. After 8 weeks, switch to a lower dose of over-the-counter vitamin D3 2000 units every day. Other labs are stable.

## 2021-07-16 ENCOUNTER — TELEPHONE (OUTPATIENT)
Dept: FAMILY MEDICINE CLINIC | Age: 86
End: 2021-07-16

## 2021-07-16 NOTE — TELEPHONE ENCOUNTER
Patient returned Clarissa's call and said she is out of town, but  can be reach on the number listed below.       [facility]# [phone]

## 2021-07-19 ENCOUNTER — PATIENT MESSAGE (OUTPATIENT)
Dept: FAMILY MEDICINE CLINIC | Age: 86
End: 2021-07-19

## 2021-07-19 DIAGNOSIS — E55.9 VITAMIN D DEFICIENCY: Primary | ICD-10-CM

## 2021-07-19 DIAGNOSIS — N39.0 URINARY TRACT INFECTION WITHOUT HEMATURIA, SITE UNSPECIFIED: ICD-10-CM

## 2021-07-19 RX ORDER — ASPIRIN 325 MG
TABLET, DELAYED RELEASE (ENTERIC COATED) ORAL
Qty: 8 CAPSULE | Refills: 0 | Status: CANCELLED | OUTPATIENT
Start: 2021-07-19

## 2021-07-19 NOTE — TELEPHONE ENCOUNTER
Rosalio Mcintyre, SAMANTA 0/11/8913 96:14 AM EDT      ----- Message -----  From: Prabhakar Mcknight  Sent: 7/19/2021 10:40 AM EDT  To: [facility] Nurse Pool  Subject: Visit Monica Vo, thank you for test results. I left for vacation the day after my visit and did not get results until later. I received UTI antibiotic on 07/18 and started on it. At [facility] they did not have RX for vitamin D3 50,000 units. If needed, please call it in or advise me otherwise. Plan diet changes for cholesterol and if that doesnt work daughter says just go on meds. My exercise is limited to Physical Therapy ones and [employer] bike. After antibiotic, will you need another urine sample? Please advise by reply.  Thank you and Happy Willie Hardins

## 2021-07-20 NOTE — TELEPHONE ENCOUNTER
Your UTI is still present; I have sent in a different antibiotic to your pharmacy. I will also have my nurse call you to make sure you get this message. Cholesterol is high. I would encourage healthy food choices and regular exercise before starting medications for this. Vitamin D levels are low. I have sent in a prescription for high dose vitamin D3 at 50,000 units once a week for 8 weeks. After 8 weeks, switch to a lower dose of over-the-counter vitamin D3 2000 units every day. Other labs are stable. Called pt and she states that she got the message on the portal.  Nothing needed.

## 2021-07-22 RX ORDER — ASPIRIN 325 MG
TABLET, DELAYED RELEASE (ENTERIC COATED) ORAL
Qty: 8 CAPSULE | Refills: 0 | Status: SHIPPED | OUTPATIENT
Start: 2021-07-22 | End: 2022-02-14 | Stop reason: ALTCHOICE

## 2021-07-23 NOTE — TELEPHONE ENCOUNTER
Can you check with [facility] pharmacy to see if they got the last Rx for gabapentin? We had to fax it as this pharmacy didn't take electronic prescriptions for controlled medications and it doesn't show up on  for some reason. If it didn't go through, please phone in Rx to [address]

## 2021-08-03 ENCOUNTER — LAB ONLY (OUTPATIENT)
Dept: FAMILY MEDICINE CLINIC | Age: 86
End: 2021-08-03

## 2021-08-03 DIAGNOSIS — N39.0 URINARY TRACT INFECTION WITHOUT HEMATURIA, SITE UNSPECIFIED: ICD-10-CM

## 2021-08-05 DIAGNOSIS — N39.0 URINARY TRACT INFECTION WITHOUT HEMATURIA, SITE UNSPECIFIED: Primary | ICD-10-CM

## 2021-08-05 LAB
BACTERIA SPEC CULT: ABNORMAL
CC UR VC: ABNORMAL
SERVICE CMNT-IMP: ABNORMAL

## 2021-08-05 RX ORDER — CIPROFLOXACIN 250 MG/1
250 TABLET, FILM COATED ORAL 2 TIMES DAILY
Qty: 6 TABLET | Refills: 0 | Status: SHIPPED | OUTPATIENT
Start: 2021-08-05 | End: 2021-08-08

## 2021-08-05 NOTE — PROGRESS NOTES
Reviewed results in mychart; will send in cipro, dose adjusted, and referral to urology for recurrent UTI.

## 2021-09-13 ENCOUNTER — PATIENT MESSAGE (OUTPATIENT)
Dept: FAMILY MEDICINE CLINIC | Age: 86
End: 2021-09-13

## 2021-09-13 RX ORDER — METRONIDAZOLE 10 MG/G
GEL TOPICAL DAILY
Qty: 45 G | Refills: 0 | Status: SHIPPED | OUTPATIENT
Start: 2021-09-13 | End: 2021-09-17 | Stop reason: SDUPTHER

## 2021-09-17 ENCOUNTER — PATIENT MESSAGE (OUTPATIENT)
Dept: FAMILY MEDICINE CLINIC | Age: 86
End: 2021-09-17

## 2021-09-17 NOTE — TELEPHONE ENCOUNTER
PCP: Adelaida Almeida MD    Last appt: 7/8/2021  No future appointments. Requested Prescriptions     Pending Prescriptions Disp Refills    metroNIDAZOLE (METROGEL) 1 % topical gel 45 g 0     Sig: Apply  to affected area daily.  Use a thin layer to affected areas after washing         Prior labs and Blood pressures:  BP Readings from Last 3 Encounters:   07/08/21 127/72   05/18/21 137/74   04/08/21 (!) 160/82     Lab Results   Component Value Date/Time    Sodium 137 07/08/2021 10:38 AM    Potassium 5.4 (H) 07/08/2021 10:38 AM    Chloride 105 07/08/2021 10:38 AM    CO2 23 07/08/2021 10:38 AM    Anion gap 9 07/08/2021 10:38 AM    Glucose 83 07/08/2021 10:38 AM    BUN 16 07/08/2021 10:38 AM    Creatinine 0.91 07/08/2021 10:38 AM    BUN/Creatinine ratio 18 07/08/2021 10:38 AM    GFR est AA >60 07/08/2021 10:38 AM    GFR est non-AA 58 (L) 07/08/2021 10:38 AM    Calcium 9.6 07/08/2021 10:38 AM     No results found for: HBA1C, CTO1YDBI, ONX8AOJH  Lab Results   Component Value Date/Time    Cholesterol, total 273 (H) 07/08/2021 10:38 AM    HDL Cholesterol 80 07/08/2021 10:38 AM    LDL, calculated 176.2 (H) 07/08/2021 10:38 AM    VLDL, calculated 16.8 07/08/2021 10:38 AM    Triglyceride 84 07/08/2021 10:38 AM    CHOL/HDL Ratio 3.4 07/08/2021 10:38 AM     Lab Results   Component Value Date/Time    Vitamin D 25-Hydroxy 22.6 (L) 07/08/2021 10:38 AM       Lab Results   Component Value Date/Time    TSH 1.76 07/08/2021 10:38 AM

## 2021-09-20 RX ORDER — METRONIDAZOLE 10 MG/G
GEL TOPICAL DAILY
Qty: 45 G | Refills: 2 | Status: SHIPPED | OUTPATIENT
Start: 2021-09-20 | End: 2022-06-17 | Stop reason: SDUPTHER

## 2021-10-15 ENCOUNTER — PATIENT MESSAGE (OUTPATIENT)
Dept: FAMILY MEDICINE CLINIC | Age: 86
End: 2021-10-15

## 2021-10-15 DIAGNOSIS — B02.22 NEUROPATHIC POSTHERPETIC TRIGEMINAL NEURALGIA: ICD-10-CM

## 2021-10-18 RX ORDER — GABAPENTIN 100 MG/1
200 CAPSULE ORAL 3 TIMES DAILY
Qty: 180 CAPSULE | Refills: 2 | Status: SHIPPED | OUTPATIENT
Start: 2021-10-18 | End: 2021-12-27 | Stop reason: SDUPTHER

## 2021-10-18 NOTE — TELEPHONE ENCOUNTER
Antwon Sagastume MD 10/18/2021 8:51 AM EDT      ----- Message -----  From: Scottish Robert  Sent: 10/15/2021 1:28 PM EDT  To: [facility] Nurse Pool  Subject: Prescription Question     Hi, Dr. Ana Jones had me to take one less Gabapentin but is did not work for me so I returned to 6/100mg a day, so I am running low on my RX. would you please place an order with [facility] for Gabapentin 100mg. Thank you , Jesusita Moore  Happy Fall!

## 2021-11-30 RX ORDER — LEVOTHYROXINE SODIUM 50 UG/1
TABLET ORAL
Qty: 90 TABLET | Refills: 0 | Status: SHIPPED | OUTPATIENT
Start: 2021-11-30 | End: 2022-04-28 | Stop reason: SDUPTHER

## 2021-11-30 NOTE — TELEPHONE ENCOUNTER
PCP: Ilya Evans MD    Last appt: 7/8/2021  No future appointments.     Requested Prescriptions     Pending Prescriptions Disp Refills    levothyroxine (SYNTHROID) 50 mcg tablet 90 Tablet 3     Sig: TAKE 1 TABLET BY MOUTH EVERY DAY         Prior labs and Blood pressures:  BP Readings from Last 3 Encounters:   07/08/21 127/72   05/18/21 137/74   04/08/21 (!) 160/82     Lab Results   Component Value Date/Time    Sodium 137 07/08/2021 10:38 AM    Potassium 5.4 (H) 07/08/2021 10:38 AM    Chloride 105 07/08/2021 10:38 AM    CO2 23 07/08/2021 10:38 AM    Anion gap 9 07/08/2021 10:38 AM    Glucose 83 07/08/2021 10:38 AM    BUN 16 07/08/2021 10:38 AM    Creatinine 0.91 07/08/2021 10:38 AM    BUN/Creatinine ratio 18 07/08/2021 10:38 AM    GFR est AA >60 07/08/2021 10:38 AM    GFR est non-AA 58 (L) 07/08/2021 10:38 AM    Calcium 9.6 07/08/2021 10:38 AM     No results found for: HBA1C, [id number], [id number]  Lab Results   Component Value Date/Time    Cholesterol, total 273 (H) 07/08/2021 10:38 AM    HDL Cholesterol 80 07/08/2021 10:38 AM    LDL, calculated 176.2 (H) 07/08/2021 10:38 AM    VLDL, calculated 16.8 07/08/2021 10:38 AM    Triglyceride 84 07/08/2021 10:38 AM    CHOL/HDL Ratio 3.4 07/08/2021 10:38 AM     Lab Results   Component Value Date/Time    Vitamin D 25-Hydroxy 22.6 (L) 07/08/2021 10:38 AM       Lab Results   Component Value Date/Time    TSH 1.76 07/08/2021 10:38 AM

## 2021-12-27 DIAGNOSIS — B02.22 NEUROPATHIC POSTHERPETIC TRIGEMINAL NEURALGIA: ICD-10-CM

## 2021-12-27 NOTE — TELEPHONE ENCOUNTER
PCP: Acacia Rosario MD    Last appt: 7/8/2021  No future appointments. Requested Prescriptions     Pending Prescriptions Disp Refills    gabapentin (NEURONTIN) 100 mg capsule 180 Capsule 2     Sig: Take 2 Capsules by mouth three (3) times daily. Max Daily Amount: 600 mg.  Fax Anjali [phone]         Prior labs and Blood pressures:  BP Readings from Last 3 Encounters:   07/08/21 127/72   05/18/21 137/74   04/08/21 (!) 160/82     Lab Results   Component Value Date/Time    Sodium 137 07/08/2021 10:38 AM    Potassium 5.4 (H) 07/08/2021 10:38 AM    Chloride 105 07/08/2021 10:38 AM    CO2 23 07/08/2021 10:38 AM    Anion gap 9 07/08/2021 10:38 AM    Glucose 83 07/08/2021 10:38 AM    BUN 16 07/08/2021 10:38 AM    Creatinine 0.91 07/08/2021 10:38 AM    BUN/Creatinine ratio 18 07/08/2021 10:38 AM    GFR est AA >60 07/08/2021 10:38 AM    GFR est non-AA 58 (L) 07/08/2021 10:38 AM    Calcium 9.6 07/08/2021 10:38 AM     No results found for: HBA1C, DDO7PUQA, URO5CSAV  Lab Results   Component Value Date/Time    Cholesterol, total 273 (H) 07/08/2021 10:38 AM    HDL Cholesterol 80 07/08/2021 10:38 AM    LDL, calculated 176.2 (H) 07/08/2021 10:38 AM    VLDL, calculated 16.8 07/08/2021 10:38 AM    Triglyceride 84 07/08/2021 10:38 AM    CHOL/HDL Ratio 3.4 07/08/2021 10:38 AM     Lab Results   Component Value Date/Time    Vitamin D 25-Hydroxy 22.6 (L) 07/08/2021 10:38 AM       Lab Results   Component Value Date/Time    TSH 1.76 07/08/2021 10:38 AM

## 2021-12-28 RX ORDER — GABAPENTIN 100 MG/1
200 CAPSULE ORAL 3 TIMES DAILY
Qty: 180 CAPSULE | Refills: 0 | Status: SHIPPED | OUTPATIENT
Start: 2021-12-28 | End: 2022-02-07 | Stop reason: SDUPTHER

## 2022-02-02 ENCOUNTER — PATIENT MESSAGE (OUTPATIENT)
Dept: FAMILY MEDICINE CLINIC | Age: 87
End: 2022-02-02

## 2022-02-02 DIAGNOSIS — B02.22 NEUROPATHIC POSTHERPETIC TRIGEMINAL NEURALGIA: ICD-10-CM

## 2022-02-02 LAB — SARS-COV-2, NAA: NEGATIVE

## 2022-02-07 DIAGNOSIS — B02.22 NEUROPATHIC POSTHERPETIC TRIGEMINAL NEURALGIA: ICD-10-CM

## 2022-02-07 RX ORDER — GABAPENTIN 100 MG/1
200 CAPSULE ORAL 3 TIMES DAILY
Qty: 180 CAPSULE | Refills: 0 | Status: SHIPPED | OUTPATIENT
Start: 2022-02-07 | End: 2022-03-21 | Stop reason: SDUPTHER

## 2022-02-07 RX ORDER — GABAPENTIN 100 MG/1
200 CAPSULE ORAL 3 TIMES DAILY
Qty: 180 CAPSULE | Refills: 0 | Status: SHIPPED | OUTPATIENT
Start: 2022-02-07 | End: 2022-02-07 | Stop reason: SDUPTHER

## 2022-02-07 NOTE — TELEPHONE ENCOUNTER
From: Chris Benites  To: Kendra Adame MD  Sent: 2/2/2022 11:46 AM EST  Subject: Blood work to obtain [address]    Has an order been placed by Dr. Seth Prado for my blood work? I understand I will not be able to get RX filled until this is done. Will soon need meds.  Thank you, Mini Guillaume

## 2022-02-14 ENCOUNTER — OFFICE VISIT (OUTPATIENT)
Dept: FAMILY MEDICINE CLINIC | Age: 87
End: 2022-02-14
Payer: MEDICARE

## 2022-02-14 VITALS
SYSTOLIC BLOOD PRESSURE: 138 MMHG | RESPIRATION RATE: 16 BRPM | TEMPERATURE: 97.4 F | OXYGEN SATURATION: 98 % | BODY MASS INDEX: 24.16 KG/M2 | HEART RATE: 58 BPM | HEIGHT: 65 IN | DIASTOLIC BLOOD PRESSURE: 90 MMHG | WEIGHT: 145 LBS

## 2022-02-14 DIAGNOSIS — E78.00 HYPERCHOLESTEROLEMIA: ICD-10-CM

## 2022-02-14 DIAGNOSIS — J30.89 ALLERGIC RHINOCONJUNCTIVITIS, SEASONAL AND PERENNIAL: ICD-10-CM

## 2022-02-14 DIAGNOSIS — M85.89 OSTEOPENIA OF MULTIPLE SITES: ICD-10-CM

## 2022-02-14 DIAGNOSIS — E03.9 ACQUIRED HYPOTHYROIDISM: ICD-10-CM

## 2022-02-14 DIAGNOSIS — J30.2 ALLERGIC RHINOCONJUNCTIVITIS, SEASONAL AND PERENNIAL: ICD-10-CM

## 2022-02-14 DIAGNOSIS — B02.22 NEUROPATHIC POSTHERPETIC TRIGEMINAL NEURALGIA: ICD-10-CM

## 2022-02-14 DIAGNOSIS — H10.10 ALLERGIC RHINOCONJUNCTIVITIS, SEASONAL AND PERENNIAL: ICD-10-CM

## 2022-02-14 DIAGNOSIS — N32.81 OAB (OVERACTIVE BLADDER): ICD-10-CM

## 2022-02-14 DIAGNOSIS — I10 PRIMARY HYPERTENSION: Primary | ICD-10-CM

## 2022-02-14 DIAGNOSIS — N18.30 STAGE 3 CHRONIC KIDNEY DISEASE, UNSPECIFIED WHETHER STAGE 3A OR 3B CKD (HCC): ICD-10-CM

## 2022-02-14 DIAGNOSIS — E55.9 VITAMIN D DEFICIENCY: ICD-10-CM

## 2022-02-14 LAB
25(OH)D3 SERPL-MCNC: 60.4 NG/ML (ref 30–100)
ALBUMIN SERPL-MCNC: 3.9 G/DL (ref 3.5–5)
ALBUMIN/GLOB SERPL: 1.1 {RATIO} (ref 1.1–2.2)
ALP SERPL-CCNC: 86 U/L (ref 45–117)
ALT SERPL-CCNC: 18 U/L (ref 12–78)
ANION GAP SERPL CALC-SCNC: 4 MMOL/L (ref 5–15)
AST SERPL-CCNC: 19 U/L (ref 15–37)
BILIRUB SERPL-MCNC: 0.6 MG/DL (ref 0.2–1)
BUN SERPL-MCNC: 18 MG/DL (ref 6–20)
BUN/CREAT SERPL: 18 (ref 12–20)
CALCIUM SERPL-MCNC: 9.9 MG/DL (ref 8.5–10.1)
CHLORIDE SERPL-SCNC: 104 MMOL/L (ref 97–108)
CHOLEST SERPL-MCNC: 273 MG/DL
CO2 SERPL-SCNC: 28 MMOL/L (ref 21–32)
CREAT SERPL-MCNC: 1.01 MG/DL (ref 0.55–1.02)
GLOBULIN SER CALC-MCNC: 3.5 G/DL (ref 2–4)
GLUCOSE SERPL-MCNC: 90 MG/DL (ref 65–100)
HDLC SERPL-MCNC: 82 MG/DL
HDLC SERPL: 3.3 {RATIO} (ref 0–5)
LDLC SERPL CALC-MCNC: 162.6 MG/DL (ref 0–100)
POTASSIUM SERPL-SCNC: 4.7 MMOL/L (ref 3.5–5.1)
PROT SERPL-MCNC: 7.4 G/DL (ref 6.4–8.2)
SODIUM SERPL-SCNC: 136 MMOL/L (ref 136–145)
TRIGL SERPL-MCNC: 142 MG/DL (ref ?–150)
VLDLC SERPL CALC-MCNC: 28.4 MG/DL

## 2022-02-14 PROCEDURE — G8420 CALC BMI NORM PARAMETERS: HCPCS | Performed by: NURSE PRACTITIONER

## 2022-02-14 PROCEDURE — G0463 HOSPITAL OUTPT CLINIC VISIT: HCPCS | Performed by: NURSE PRACTITIONER

## 2022-02-14 PROCEDURE — 1101F PT FALLS ASSESS-DOCD LE1/YR: CPT | Performed by: NURSE PRACTITIONER

## 2022-02-14 PROCEDURE — G8536 NO DOC ELDER MAL SCRN: HCPCS | Performed by: NURSE PRACTITIONER

## 2022-02-14 PROCEDURE — G8432 DEP SCR NOT DOC, RNG: HCPCS | Performed by: NURSE PRACTITIONER

## 2022-02-14 PROCEDURE — G8427 DOCREV CUR MEDS BY ELIG CLIN: HCPCS | Performed by: NURSE PRACTITIONER

## 2022-02-14 PROCEDURE — 1090F PRES/ABSN URINE INCON ASSESS: CPT | Performed by: NURSE PRACTITIONER

## 2022-02-14 PROCEDURE — 99214 OFFICE O/P EST MOD 30 MIN: CPT | Performed by: NURSE PRACTITIONER

## 2022-02-14 RX ORDER — CHOLECALCIFEROL (VITAMIN D3) 125 MCG
CAPSULE ORAL
COMMUNITY
End: 2022-06-24

## 2022-02-14 RX ORDER — LISINOPRIL 5 MG/1
5 TABLET ORAL DAILY
Qty: 90 TABLET | Refills: 0 | Status: SHIPPED | OUTPATIENT
Start: 2022-02-14 | End: 2022-04-28 | Stop reason: SDUPTHER

## 2022-02-14 RX ORDER — LORATADINE 10 MG/1
10 TABLET ORAL
COMMUNITY

## 2022-02-14 NOTE — PROGRESS NOTES
HISTORY OF PRESENT ILLNESS  Ratna Subramanian is a 80 y.o. female. HPI  Patient of Dr. Edmund Lui for follow up health problems. HTN. Taking low dose lisinopril. BP elevated today in office. Hypothyroid. Feeling well on current dose of levothyroxine. Osteopenia. Taking fosamax weekly. Trigeminal neuropathy. Taking gabapentin daily with adequate symptom control. OAB. Using ditropan as needed. History of CKD. Hypercholesterolemia. Last LDL at 176. Has limited saturated fats and cholesterol in diet. Vitamin D deficiency. Completed rx vitamin D3. Now taking 2,000 iu OTC. AR. Taking daily claritin. C/o intermittent throat irritation. Was seen at [facility]. Strep and COVID tests negative. Has appointment with PCP in 3 months for wellness exam.  Patient Active Problem List   Diagnosis Code    Oral lichen planus H99.5    Balance problem R26.89    Neuropathic postherpetic trigeminal neuralgia B02.22    Acquired hypothyroidism E03.9    HTN, goal below 150/90 I10    OAB (overactive bladder) N32.81    Excessive drinking alcohol F10.10    Osteopenia of multiple sites M85.89    Chronic renal disease, stage 3, moderately decreased glomerular filtration rate between 30-59 mL/min/1.73 square meter (HCC) N18.30    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.062    Allergic rhinoconjunctivitis, seasonal and perennial J30.2, H10.10, J30.89    Iron deficiency anemia D50.9     Current Outpatient Medications   Medication Sig    loratadine (Claritin) 10 mg tablet Take 10 mg by mouth.  cholecalciferol, vitamin D3, (Vitamin D3) 50 mcg (2,000 unit) tab Take  by mouth.  lisinopriL (PRINIVIL, ZESTRIL) 5 mg tablet Take 1 Tablet by mouth daily.  gabapentin (NEURONTIN) 100 mg capsule Take 2 Capsules by mouth three (3) times daily. Max Daily Amount: 600 mg.  Fax Anjali [phone]    levothyroxine (SYNTHROID) 50 mcg tablet TAKE 1 TABLET BY MOUTH EVERY DAY    metroNIDAZOLE (METROGEL) 1 % topical gel Apply  to affected area daily. Use a thin layer to affected areas after washing    alendronate (FOSAMAX) 70 mg tablet Take 1 Tablet by mouth every seven (7) days.  biotin 10,000 mcg cap Take  by mouth.  oxybutynin (DITROPAN) 5 mg tablet Take 1 Tab by mouth daily as needed.  CALCIUM CARB/VIT D2/MINERALS (CALTRATE PLUS PO) Take 500 mg by mouth daily. No current facility-administered medications for this visit. Social History     Tobacco Use    Smoking status: Former Smoker    Smokeless tobacco: Never Used   Vaping Use    Vaping Use: Never used   Substance Use Topics    Alcohol use: Yes     Alcohol/week: 14.0 standard drinks     Types: 8 Glasses of wine, 6 Shots of liquor per week     Comment: occ.  Drug use: No     Blood pressure (!) 138/90, pulse (!) 58, temperature 97.4 °F (36.3 °C), temperature source Temporal, resp. rate 16, height 5' 5\" (1.651 m), weight 145 lb (65.8 kg), SpO2 98 %. Review of Systems   HENT: Positive for sore throat. Negative for congestion, ear pain and sinus pain. Respiratory: Negative for cough and wheezing. Cardiovascular: Negative for chest pain, palpitations and leg swelling. Genitourinary: Negative. Neurological: Negative for dizziness and headaches. All other systems reviewed and are negative. Physical Exam  Constitutional:       General: She is not in acute distress. HENT:      Right Ear: Tympanic membrane and ear canal normal.      Left Ear: Tympanic membrane and ear canal normal.      Mouth/Throat:      Pharynx: Posterior oropharyngeal erythema present. No oropharyngeal exudate. Cardiovascular:      Rate and Rhythm: Normal rate and regular rhythm. Heart sounds: Normal heart sounds. Pulmonary:      Effort: Pulmonary effort is normal.      Breath sounds: Normal breath sounds. Musculoskeletal:      Cervical back: Neck supple. Right lower leg: No edema. Left lower leg: No edema. Lymphadenopathy:      Cervical: No cervical adenopathy. Skin:     General: Skin is warm and dry. Neurological:      Mental Status: She is alert. Coordination: Coordination normal.   Psychiatric:         Mood and Affect: Mood normal.         ASSESSMENT and PLAN  Diagnoses and all orders for this visit:    1. Primary hypertension  -     METABOLIC PANEL, COMPREHENSIVE; Future  -     lisinopriL (PRINIVIL, ZESTRIL) 5 mg tablet; Take 1 Tablet by mouth daily. Sub optimal control. Will increase lisinopril to 5mg daily. Recommend interim BP monitoring and record. Report if > 130/80 consistently. 2. Acquired hypothyroidism  Last labs stable. Continue current medication. 3. Hypercholesterolemia  -     LIPID PANEL; Future  -     METABOLIC PANEL, COMPREHENSIVE; Future  Reviewed healthy, AHA diet and exercise recommendations. Non fasting labs sent. 4. Vitamin D deficiency  -     VITAMIN D, 25 HYDROXY; Future    5. Stage 3 chronic kidney disease, unspecified whether stage 3a or 3b CKD ([facility] [address]  Stable based on last labs. 6. Neuropathic postherpetic trigeminal neuralgia  Continue gabapentin. 7. OAB (overactive bladder)  Stable on ditropan. 8. Osteopenia of multiple sites  Continue fosamax. 9. Allergic rhinoconjunctivitis, seasonal and perennial  Continue daily claritin. Trial saline nasal spray. Throat lozenges. Humidifier. Salt /water gargles prn throat irritation. Follow up in 4 months as scheduled for AWV. We discussed the expected course, resolution and complications of the diagnosis in detail. Medication risks, benefits, costs, interactions and side effects were discussed. The patient was advised to contact the office for worsening of condition or FTI as anticipated. The patient expressed understanding of the diagnosis and plan.

## 2022-02-14 NOTE — PROGRESS NOTES
Chief Complaint   Patient presents with    Medication Evaluation    Medication Refill    Sore Throat       1. \"Have you been to the ER, urgent care clinic since your last visit? Hospitalized since your last visit? \" No    2. \"Have you seen or consulted any other health care providers outside of the [address] [place] [address] since your last visit? \" No     3. For patients over 45: Has the patient had a colonoscopy? Yes - no Care Gap present     If the patient is female:    4. For patients over 40: Has the patient had a mammogram? Yes - no Care Gap present    5. For patients over 21: Has the patient had a pap smear?  NA - based on age    1 most recent PHQ Screens 2/14/2022   Little interest or pleasure in doing things Not at all   Feeling down, depressed, irritable, or hopeless Not at all   Total Score PHQ 2 0

## 2022-02-15 ENCOUNTER — PATIENT MESSAGE (OUTPATIENT)
Dept: FAMILY MEDICINE CLINIC | Age: 87
End: 2022-02-15

## 2022-02-15 DIAGNOSIS — B02.22 NEUROPATHIC POSTHERPETIC TRIGEMINAL NEURALGIA: ICD-10-CM

## 2022-03-18 PROBLEM — I10 HTN, GOAL BELOW 150/90: Status: ACTIVE | Noted: 2017-11-28

## 2022-03-18 PROBLEM — R26.89 BALANCE PROBLEM: Status: ACTIVE | Noted: 2017-11-28

## 2022-03-18 PROBLEM — E03.9 ACQUIRED HYPOTHYROIDISM: Status: ACTIVE | Noted: 2017-11-28

## 2022-03-19 PROBLEM — M48.062 SPINAL STENOSIS OF LUMBAR REGION WITH NEUROGENIC CLAUDICATION: Status: ACTIVE | Noted: 2019-02-06

## 2022-03-19 PROBLEM — D50.9 IRON DEFICIENCY ANEMIA: Status: ACTIVE | Noted: 2019-12-12

## 2022-03-19 PROBLEM — F10.10 EXCESSIVE DRINKING ALCOHOL: Status: ACTIVE | Noted: 2017-11-29

## 2022-03-19 PROBLEM — B02.22 NEUROPATHIC POSTHERPETIC TRIGEMINAL NEURALGIA: Status: ACTIVE | Noted: 2017-11-28

## 2022-03-19 PROBLEM — N32.81 OAB (OVERACTIVE BLADDER): Status: ACTIVE | Noted: 2017-11-29

## 2022-03-20 PROBLEM — J30.89 ALLERGIC RHINOCONJUNCTIVITIS, SEASONAL AND PERENNIAL: Status: ACTIVE | Noted: 2019-09-05

## 2022-03-20 PROBLEM — L43.8 ORAL LICHEN PLANUS: Status: ACTIVE | Noted: 2017-11-28

## 2022-03-20 PROBLEM — H10.10 ALLERGIC RHINOCONJUNCTIVITIS, SEASONAL AND PERENNIAL: Status: ACTIVE | Noted: 2019-09-05

## 2022-03-20 PROBLEM — M85.89 OSTEOPENIA OF MULTIPLE SITES: Status: ACTIVE | Noted: 2018-02-02

## 2022-03-20 PROBLEM — N18.30 CHRONIC RENAL DISEASE, STAGE 3, MODERATELY DECREASED GLOMERULAR FILTRATION RATE BETWEEN 30-59 ML/MIN/1.73 SQUARE METER (HCC): Status: ACTIVE | Noted: 2018-07-19

## 2022-03-20 PROBLEM — J30.2 ALLERGIC RHINOCONJUNCTIVITIS, SEASONAL AND PERENNIAL: Status: ACTIVE | Noted: 2019-09-05

## 2022-03-21 RX ORDER — GABAPENTIN 100 MG/1
200 CAPSULE ORAL 3 TIMES DAILY
Qty: 180 CAPSULE | Refills: 2 | Status: SHIPPED | OUTPATIENT
Start: 2022-03-21 | End: 2022-06-24 | Stop reason: SDUPTHER

## 2022-03-21 NOTE — TELEPHONE ENCOUNTER
Jamesonhart, Generic 3/20/2022 11:56 AM EDT    Thank you. At present doing ok with 5mg, thirst stopped, maybe another cause. I need an RX for gabapentin 100mg, caps 2/ 3times a day. Please direct it to [facility]. I always have to order ahead of time due to our poor postal service. Please reply that the order is placed. TY, Glad you are back.  Stay well, Cameron Jackson

## 2022-04-28 ENCOUNTER — PATIENT MESSAGE (OUTPATIENT)
Dept: FAMILY MEDICINE CLINIC | Age: 87
End: 2022-04-28

## 2022-04-28 DIAGNOSIS — I10 PRIMARY HYPERTENSION: ICD-10-CM

## 2022-04-28 RX ORDER — LEVOTHYROXINE SODIUM 50 UG/1
TABLET ORAL
Qty: 90 TABLET | Refills: 0 | Status: CANCELLED | OUTPATIENT
Start: 2022-04-28

## 2022-04-28 RX ORDER — LISINOPRIL 5 MG/1
5 TABLET ORAL DAILY
Qty: 90 TABLET | Refills: 3 | Status: SHIPPED | OUTPATIENT
Start: 2022-04-28 | End: 2022-06-24 | Stop reason: SDUPTHER

## 2022-04-28 RX ORDER — LEVOTHYROXINE SODIUM 50 UG/1
TABLET ORAL
Qty: 90 TABLET | Refills: 3 | Status: SHIPPED | OUTPATIENT
Start: 2022-04-28 | End: 2022-06-24 | Stop reason: SDUPTHER

## 2022-04-28 RX ORDER — LISINOPRIL 5 MG/1
5 TABLET ORAL DAILY
Qty: 90 TABLET | Refills: 0 | Status: CANCELLED | OUTPATIENT
Start: 2022-04-28

## 2022-04-28 NOTE — TELEPHONE ENCOUNTER
----- Message from LucaRUST. Karen sent at 4/28/2022  1:25 PM EDT -----  Regarding: Prescription and covid booster  Hi,  First I need an order placed to [facility]  for Lisinospril 5mg and Levothyroxine  50MCG. Next a question about Covid booster. At [address] [facility] they are offering another booster on May 5. My previous shots have been Oleksandr Live and this one will be  Maderna. Is that a problem? Thank you for your reply.   Happy Spring, Elver Rosario

## 2022-05-18 ENCOUNTER — TRANSCRIBE ORDER (OUTPATIENT)
Dept: GENERAL RADIOLOGY | Age: 87
End: 2022-05-18

## 2022-05-18 DIAGNOSIS — M54.16 LUMBAR RADICULOPATHY: Primary | ICD-10-CM

## 2022-05-25 ENCOUNTER — OFFICE VISIT (OUTPATIENT)
Dept: FAMILY MEDICINE CLINIC | Age: 87
End: 2022-05-25
Payer: MEDICARE

## 2022-05-25 VITALS
HEART RATE: 72 BPM | HEIGHT: 65 IN | TEMPERATURE: 96.8 F | OXYGEN SATURATION: 98 % | WEIGHT: 148.6 LBS | RESPIRATION RATE: 18 BRPM | SYSTOLIC BLOOD PRESSURE: 130 MMHG | DIASTOLIC BLOOD PRESSURE: 72 MMHG | BODY MASS INDEX: 24.76 KG/M2

## 2022-05-25 DIAGNOSIS — L98.9 SKIN LESION OF FOOT: Primary | ICD-10-CM

## 2022-05-25 PROCEDURE — 99213 OFFICE O/P EST LOW 20 MIN: CPT | Performed by: STUDENT IN AN ORGANIZED HEALTH CARE EDUCATION/TRAINING PROGRAM

## 2022-05-25 PROCEDURE — 1123F ACP DISCUSS/DSCN MKR DOCD: CPT | Performed by: STUDENT IN AN ORGANIZED HEALTH CARE EDUCATION/TRAINING PROGRAM

## 2022-05-25 PROCEDURE — G0463 HOSPITAL OUTPT CLINIC VISIT: HCPCS | Performed by: STUDENT IN AN ORGANIZED HEALTH CARE EDUCATION/TRAINING PROGRAM

## 2022-05-25 RX ORDER — PRENATAL VIT 91/IRON/FOLIC/DHA 28-975-200
COMBINATION PACKAGE (EA) ORAL
COMMUNITY
Start: 2022-05-20 | End: 2022-06-03

## 2022-05-25 RX ORDER — DOXYCYCLINE 100 MG/1
100 CAPSULE ORAL 2 TIMES DAILY
COMMUNITY

## 2022-05-25 NOTE — PROGRESS NOTES
Rl Valentin  80 y.o. female  5/2/1929  [address] [facility],[address] [address]  [id number]     [address] [place] [address]       Chief Complaint: foot pain and redness  Source: self     Subjective  Rl Valentin is an 80 y.o. female who presents for foot pain and redness. She applied a large bandaid to the outside of her right forefoot about a week ago because her foot was hurting and she wanted to prevent rubbing. The bandaid made the pain worse so she removed it and noticed a lesion on the inside of her pinkie toe that had associated foul smell. Went to [facility] clinic and was prescribed topical antifungal for the foot. The redness in her forefoot has dissipated and she no longer has the smell between the toes. Allergies - reviewed: Allergies   Allergen Reactions    Voltaren [Diclofenac Sodium] Anaphylaxis    [place]era Kindred Other (comments)    Iron Other (comments)     GI UPSET AND DIZZINESS     Lyrica [Pregabalin] Drowsiness    Neosporin (Neomycin-Polymyx) Other (comments)    Neosporin [Benzalkonium Chloride] Rash    Sulfa (Sulfonamide Antibiotics) Other (comments)         Medications - reviewed:   Current Outpatient Medications   Medication Sig    terbinafine HCL (LAMISIL) 1 % topical cream Apply between toes twice daily x1 week and/or 2 weeks for sides/bottom of foot.  levothyroxine (SYNTHROID) 50 mcg tablet TAKE 1 TABLET BY MOUTH EVERY DAY    lisinopriL (PRINIVIL, ZESTRIL) 5 mg tablet Take 1 Tablet by mouth daily.  gabapentin (NEURONTIN) 100 mg capsule Take 2 Capsules by mouth three (3) times daily. Max Daily Amount: 600 mg. Fax [facility] [phone]    loratadine (Claritin) 10 mg tablet Take 10 mg by mouth.  cholecalciferol, vitamin D3, (Vitamin D3) 50 mcg (2,000 unit) tab Take  by mouth.  metroNIDAZOLE (METROGEL) 1 % topical gel Apply  to affected area daily.  Use a thin layer to affected areas after washing    alendronate (FOSAMAX) 70 mg tablet Take 1 Tablet by mouth every seven (7) days.  biotin 10,000 mcg cap Take  by mouth.  oxybutynin (DITROPAN) 5 mg tablet Take 1 Tab by mouth daily as needed.  CALCIUM CARB/VIT D2/MINERALS (CALTRATE PLUS PO) Take 500 mg by mouth daily.  doxycycline (VIBRAMYCIN) 100 mg capsule Take 100 mg by mouth two (2) times a day. No current facility-administered medications for this visit.          Past Medical History - reviewed:  Past Medical History:   Diagnosis Date    Acquired hypothyroidism 11/28/2017    Allergic rhinoconjunctivitis, seasonal and perennial 9/5/2019    Arthritis     Balance problem 11/28/2017    Excessive drinking alcohol 11/29/2017    History of hormone replacement therapy 1987    stopped    HTN, goal below 150/90 11/28/2017    Iron deficiency anemia 08/16/6511    Lichen planus     Oral    Neuropathic postherpetic trigeminal neuralgia 11/28/2017    OAB (overactive bladder) 11/29/2017    Osteopenia of multiple sites 2/2/2018    dexa 1/18, started fosamax    Spinal stenosis of lumbar region with neurogenic claudication 2/6/2019         Past Surgical History - reviewed:   Past Surgical History:   Procedure Laterality Date    HX BREAST BIOPSY Left 2014    Stereo    HX COLONOSCOPY  2012    HX CYST INCISION AND DRAINAGE Right 04/16/2018    benign    HX KNEE REPLACEMENT Right 2014    HX ROTATOR CUFF REPAIR Right 2001    IR INJ FORAMIN EPID LUMB ANES/STER SNGL  2/20/2019    IR INJ FORAMIN EPID LUMB ANES/STER SNGL  6/21/2019    IR INJ FORAMIN EPID LUMB ANES/STER SNGL  12/20/2019    IR INJ FORAMIN EPID LUMB ANES/STER SNGL  11/17/2020         Social History - reviewed:  Social History     Socioeconomic History    Marital status:      Spouse name: Not on file    Number of children: Not on file    Years of education: Not on file    Highest education level: Not on file   Occupational History    Not on file   Tobacco Use    Smoking status: Former Smoker    Smokeless tobacco: Never Used   Vaping Use    Vaping Use: Never used   Substance and Sexual Activity    Alcohol use: Yes     Alcohol/week: 14.0 standard drinks     Types: 8 Glasses of wine, 6 Shots of liquor per week     Comment: occ.  Drug use: No    Sexual activity: Not Currently   Other Topics Concern    Not on file   Social History Narrative    Lives in cottage, independent living, [place]    Daughter Shiraz Badillo lives nearby     Social Determinants of Health     Financial Resource Strain:     Difficulty of Paying Living Expenses: Not on file   Food Insecurity:     Worried About Running Out of Food in the Last Year: Not on file    Lonnie of Food in the Last Year: Not on file   Transportation Needs:     Lack of Transportation (Medical): Not on file    Lack of Transportation (Non-Medical):  Not on file   Physical Activity:     Days of Exercise per Week: Not on file    Minutes of Exercise per Session: Not on file   Stress:     Feeling of Stress : Not on file   Social Connections:     Frequency of Communication with Friends and Family: Not on file    Frequency of Social Gatherings with Friends and Family: Not on file    Attends Shinto Services: Not on file    Active Member of [address] [place] [address] [employer] or Organizations: Not on file    Attends Club or Organization Meetings: Not on file    Marital Status: Not on file   Intimate Partner Violence:     Fear of Current or Ex-Partner: Not on file    Emotionally Abused: Not on file    Physically Abused: Not on file    Sexually Abused: Not on file   Housing Stability:     Unable to Pay for Housing in the Last Year: Not on file    Number of [place] in the Last Year: Not on file    Unstable Housing in the Last Year: Not on file         Family History - reviewed:  Family History   Problem Relation Age of Onset    Other Mother         severe hot flashes    Heart Failure Mother 76    Breast Cancer Mother 76    Other Daughter         severe hot flashes    Heart Attack Father Abimbola Samuels Maternal Aunt          Immunizations - reviewed:   Immunization History   Administered Date(s) Administered    COVID-19, Moderna Booster, PF, 0.25mL Dose 05/20/2022    COVID-19, Pfizer Purple top, DILUTE for use, 12+ yrs, 30mcg/0.3mL dose 01/11/2021, 02/01/2021, 10/08/2021    Influenza High Dose Vaccine PF 10/20/2021    Influenza Vaccine 09/18/2017, 09/28/2020    Influenza Vaccine (Tri) Adjuvanted (>65 Yrs FLUAD TRI 14076) 10/19/2018, 09/05/2019    Influenza, High-dose, Quadrivalent (>65 Yrs Fluzone High Dose Quad 74495) 09/28/2020    Pneumococcal Conjugate (PCV-13) 12/05/2018    Pneumococcal Polysaccharide (PPSV-23) 12/12/2019    Tdap 07/19/2018    Zoster Recombinant 03/19/2019, 07/09/2019     Review of Systems   Constitutional: Negative for chills and fever. Musculoskeletal: Negative for falls. Skin: Negative for itching and rash. Lesion over right pinkie         Physical Exam  Visit Vitals  /72 (BP 1 Location: Right upper arm, BP Patient Position: Sitting, BP Cuff Size: Adult)   Pulse 72   Temp 96.8 °F (36 °C) (Temporal)   Resp 18   Ht 5' 5\" (1.651 m)   Wt 148 lb 9.6 oz (67.4 kg)   SpO2 98%   BMI 24.73 kg/m²       Physical Exam  Vitals and nursing note reviewed. Constitutional:       Appearance: Normal appearance. She is normal weight. Musculoskeletal:        Feet:    Feet:      Right foot:      Skin integrity: Skin breakdown present. No ulcer, blister, erythema, warmth, callus or dry skin. Toenail Condition: Right toenails are normal.      Comments: + 0.3cm break in skin with pale surrounding area of the medial 5th digit of the right foot. No surrounding erythema. No TTP including NTTP at the base of the 5th metatarsal. No purulence nor other drainage. Neurological:      Mental Status: She is alert.            Assessment/Plan    ICD-10-CM ICD-9-CM    1. Skin lesion of foot  L98.9 709.9        Alena Magallon is an 80 y.o. female presenting for followup of foot lesion which has improved with topical antifungals. On exam ulceration with central skin breakdown c/w breakdown of plantars wart which I suspect was cause of pain and possibly with additional fungal infection which has responded well to topical antifungals. Encouraged keeping area clean and dry and to monitor for redness, drainage, other signs of infection. 1. Skin lesion of foot  - keep dry, monitor for signs of infection    Patient informed to follow up: Follow-up and Dispositions    · Return for routine followup with Dr Jessica Perez. I have discussed the diagnosis with the patient and the intended plan as seen in the above orders. Patient verbalized understanding of the plan and agrees with the plan. The patient has received an after-visit summary and questions were answered concerning future plans. I have discussed medication side effects and warnings with the patient as well. Informed patient to return to the office if new symptoms arise.       Charmaine Chamberlain MD  [facility]

## 2022-05-25 NOTE — PROGRESS NOTES
Chief Complaint   Patient presents with    Foot Pain     Right Foot pain with Reddness      1. \"Have you been to the ER, urgent care clinic since your last visit? Hospitalized since your last visit? \" Yes [facility] 5 /22 DX; Foot Pain     2. \"Have you seen or consulted any other health care providers outside of the [address] [place] [address] since your last visit? \" No     3. For patients aged 39-70: Has the patient had a colonoscopy / FIT/ Cologuard? NA - based on age      If the patient is female:    4. For patients aged 41-77: Has the patient had a mammogram within the past 2 years? NA - based on age or sex      11. For patients aged 21-65: Has the patient had a pap smear?  NA - based on age or sex

## 2022-06-03 ENCOUNTER — HOSPITAL ENCOUNTER (OUTPATIENT)
Dept: MRI IMAGING | Age: 87
Discharge: HOME | End: 2022-06-03
Payer: MEDICARE

## 2022-06-03 DIAGNOSIS — M54.16 LUMBAR RADICULOPATHY: ICD-10-CM

## 2022-06-03 PROCEDURE — 72148 MRI LUMBAR SPINE W/O DYE: CPT

## 2022-06-13 ENCOUNTER — PATIENT MESSAGE (OUTPATIENT)
Dept: FAMILY MEDICINE CLINIC | Age: 87
End: 2022-06-13

## 2022-06-17 RX ORDER — METRONIDAZOLE 10 MG/G
GEL TOPICAL DAILY
Qty: 45 G | Refills: 2 | Status: SHIPPED | OUTPATIENT
Start: 2022-06-17

## 2022-06-17 NOTE — TELEPHONE ENCOUNTER
From: Navneet Conner  Sent: 6/17/2022 12:37 PM EDT  To: [facility] Nurse Pool  Subject: 6/13/2022 appt    Please put in an order for Metronidazole 1%. I use it daily for Rosacea. Thank you for your help. Happy weekend!  Norma Parker

## 2022-06-24 ENCOUNTER — OFFICE VISIT (OUTPATIENT)
Dept: FAMILY MEDICINE CLINIC | Age: 87
End: 2022-06-24
Payer: MEDICARE

## 2022-06-24 VITALS
HEIGHT: 65 IN | HEART RATE: 73 BPM | BODY MASS INDEX: 24.32 KG/M2 | OXYGEN SATURATION: 99 % | RESPIRATION RATE: 16 BRPM | SYSTOLIC BLOOD PRESSURE: 146 MMHG | WEIGHT: 146 LBS | TEMPERATURE: 97.6 F | DIASTOLIC BLOOD PRESSURE: 76 MMHG

## 2022-06-24 DIAGNOSIS — Z78.0 POSTMENOPAUSAL: ICD-10-CM

## 2022-06-24 DIAGNOSIS — Z00.00 ENCOUNTER FOR MEDICARE ANNUAL WELLNESS EXAM: Primary | ICD-10-CM

## 2022-06-24 DIAGNOSIS — E78.00 HYPERCHOLESTEROLEMIA: ICD-10-CM

## 2022-06-24 DIAGNOSIS — Z13.820 SCREENING FOR OSTEOPOROSIS: ICD-10-CM

## 2022-06-24 DIAGNOSIS — E55.9 VITAMIN D DEFICIENCY: ICD-10-CM

## 2022-06-24 DIAGNOSIS — E03.9 ACQUIRED HYPOTHYROIDISM: ICD-10-CM

## 2022-06-24 DIAGNOSIS — B02.22 NEUROPATHIC POSTHERPETIC TRIGEMINAL NEURALGIA: ICD-10-CM

## 2022-06-24 DIAGNOSIS — I10 PRIMARY HYPERTENSION: ICD-10-CM

## 2022-06-24 DIAGNOSIS — L84 CALLUS BETWEEN TOES: ICD-10-CM

## 2022-06-24 DIAGNOSIS — M85.89 OSTEOPENIA OF MULTIPLE SITES: ICD-10-CM

## 2022-06-24 LAB
25(OH)D3 SERPL-MCNC: 77.4 NG/ML (ref 30–100)
ALBUMIN SERPL-MCNC: 3.9 G/DL (ref 3.5–5)
ALBUMIN/GLOB SERPL: 1.1 {RATIO} (ref 1.1–2.2)
ALP SERPL-CCNC: 84 U/L (ref 45–117)
ALT SERPL-CCNC: 15 U/L (ref 12–78)
ANION GAP SERPL CALC-SCNC: 5 MMOL/L (ref 5–15)
AST SERPL-CCNC: 13 U/L (ref 15–37)
BILIRUB SERPL-MCNC: 0.8 MG/DL (ref 0.2–1)
BUN SERPL-MCNC: 17 MG/DL (ref 6–20)
BUN/CREAT SERPL: 16 (ref 12–20)
CALCIUM SERPL-MCNC: 9.7 MG/DL (ref 8.5–10.1)
CHLORIDE SERPL-SCNC: 104 MMOL/L (ref 97–108)
CHOLEST SERPL-MCNC: 279 MG/DL
CO2 SERPL-SCNC: 28 MMOL/L (ref 21–32)
CREAT SERPL-MCNC: 1.05 MG/DL (ref 0.55–1.02)
ERYTHROCYTE [DISTWIDTH] IN BLOOD BY AUTOMATED COUNT: 13.8 % (ref 11.5–14.5)
GLOBULIN SER CALC-MCNC: 3.5 G/DL (ref 2–4)
GLUCOSE SERPL-MCNC: 104 MG/DL (ref 65–100)
HCT VFR BLD AUTO: 48.5 % (ref 35–47)
HDLC SERPL-MCNC: 91 MG/DL
HDLC SERPL: 3.1 {RATIO} (ref 0–5)
HGB BLD-MCNC: 16.1 G/DL (ref 11.5–16)
LDLC SERPL CALC-MCNC: 170 MG/DL (ref 0–100)
MCH RBC QN AUTO: 30.9 PG (ref 26–34)
MCHC RBC AUTO-ENTMCNC: 33.2 G/DL (ref 30–36.5)
MCV RBC AUTO: 93.1 FL (ref 80–99)
NRBC # BLD: 0 K/UL (ref 0–0.01)
NRBC BLD-RTO: 0 PER 100 WBC
PLATELET # BLD AUTO: 272 K/UL (ref 150–400)
PMV BLD AUTO: 10.3 FL (ref 8.9–12.9)
POTASSIUM SERPL-SCNC: 4.8 MMOL/L (ref 3.5–5.1)
PROT SERPL-MCNC: 7.4 G/DL (ref 6.4–8.2)
RBC # BLD AUTO: 5.21 M/UL (ref 3.8–5.2)
SODIUM SERPL-SCNC: 137 MMOL/L (ref 136–145)
T4 SERPL-MCNC: 9.9 UG/DL (ref 4.8–13.9)
TRIGL SERPL-MCNC: 90 MG/DL (ref ?–150)
TSH SERPL DL<=0.05 MIU/L-ACNC: 1.31 UIU/ML (ref 0.36–3.74)
VLDLC SERPL CALC-MCNC: 18 MG/DL
WBC # BLD AUTO: 5.8 K/UL (ref 3.6–11)

## 2022-06-24 PROCEDURE — G0439 PPPS, SUBSEQ VISIT: HCPCS | Performed by: FAMILY MEDICINE

## 2022-06-24 PROCEDURE — G8420 CALC BMI NORM PARAMETERS: HCPCS | Performed by: FAMILY MEDICINE

## 2022-06-24 PROCEDURE — G8510 SCR DEP NEG, NO PLAN REQD: HCPCS | Performed by: FAMILY MEDICINE

## 2022-06-24 PROCEDURE — 1101F PT FALLS ASSESS-DOCD LE1/YR: CPT | Performed by: FAMILY MEDICINE

## 2022-06-24 PROCEDURE — 1123F ACP DISCUSS/DSCN MKR DOCD: CPT | Performed by: FAMILY MEDICINE

## 2022-06-24 PROCEDURE — G8427 DOCREV CUR MEDS BY ELIG CLIN: HCPCS | Performed by: FAMILY MEDICINE

## 2022-06-24 PROCEDURE — G8536 NO DOC ELDER MAL SCRN: HCPCS | Performed by: FAMILY MEDICINE

## 2022-06-24 RX ORDER — ALENDRONATE SODIUM 70 MG/1
70 TABLET ORAL
Qty: 13 TABLET | Refills: 3 | Status: SHIPPED | OUTPATIENT
Start: 2022-06-24

## 2022-06-24 RX ORDER — LEVOTHYROXINE SODIUM 50 UG/1
TABLET ORAL
Qty: 90 TABLET | Refills: 3 | Status: SHIPPED | OUTPATIENT
Start: 2022-06-24

## 2022-06-24 RX ORDER — LISINOPRIL 5 MG/1
5 TABLET ORAL DAILY
Qty: 90 TABLET | Refills: 0 | Status: SHIPPED | OUTPATIENT
Start: 2022-06-24 | End: 2022-08-09

## 2022-06-24 RX ORDER — GABAPENTIN 100 MG/1
200 CAPSULE ORAL 3 TIMES DAILY
Qty: 180 CAPSULE | Refills: 2 | Status: SHIPPED | OUTPATIENT
Start: 2022-06-24 | End: 2022-10-25 | Stop reason: SDUPTHER

## 2022-06-24 NOTE — PROGRESS NOTES
Chief Complaint   Patient presents with    Annual Wellness Visit    Foot Pain     painful when pressure is on toes        1. Have you been to the ER, urgent care clinic since your last visit? Hospitalized since your last visit? No    2. Have you seen or consulted any other health care providers outside of the [address] [place] [address] since your last visit? Include any pap smears or colon screening. No    3. For patients over 45: Has the patient had a colonoscopy? NA - based on age     If the patient is female:    4. For patients over 36: Has the patient had a mammogram? NA - based on age    11. For patients over 21: Has the patient had a pap smear? NA - based on age    1 most recent PHQ Screens 6/24/2022   Little interest or pleasure in doing things Not at all   Feeling down, depressed, irritable, or hopeless Not at all   Total Score PHQ 2 0       Fall Risk Assessment, last 12 mths 6/24/2022   Able to walk? Yes   Fall in past 12 months? 0   Do you feel unsteady? 0   Are you worried about falling 0   Is TUG test greater than 12 seconds? -   Is the gait abnormal? -   Number of falls in past 12 months -       ADL Assessment 6/24/2022   Feeding yourself No Help Needed   Getting from bed to chair No Help Needed   Getting dressed No Help Needed   Bathing or showering No Help Needed   Walk across the room (includes cane/walker) No Help Needed   Using the telphone No Help Needed   Taking your medications No Help Needed   Preparing meals No Help Needed   Managing money (expenses/bills) No Help Needed   Moderately strenuous housework (laundry) No Help Needed   Shopping for personal items (toiletries/medicines) No Help Needed   Shopping for groceries No Help Needed   Driving No Help Needed   Climbing a flight of stairs No Help Needed   Getting to places beyond walking distances No Help Needed       Abuse Screening Questionnaire 6/24/2022   Do you ever feel afraid of your partner?  N   Are you in a relationship with someone who physically or mentally threatens you? N   Is it safe for you to go home?  Moiz Odom

## 2022-06-24 NOTE — PROGRESS NOTES
This is the Subsequent Medicare Annual Wellness Exam, performed 12 months or more after the Initial AWV or the last Subsequent AWV    I have reviewed the patient's medical history in detail and updated the computerized patient record. Assessment/Plan   Education and counseling provided:  Are appropriate based on today's review and evaluation    1. Encounter for Medicare annual wellness exam  2. Hypercholesterolemia  -     CBC W/O DIFF; Future  -     LIPID PANEL; Future  -     METABOLIC PANEL, COMPREHENSIVE; Future  3. Callus between toes  -     REFERRAL TO PODIATRY  4. Neuropathic postherpetic trigeminal neuralgia  -     gabapentin (NEURONTIN) 100 mg capsule; Take 2 Capsules by mouth three (3) times daily. Max Daily Amount: 600 mg. Fax [employer] [phone], Normal, Disp-180 Capsule, R-2  5. Acquired hypothyroidism  -     TSH 3RD GENERATION; Future  -     T4 (THYROXINE); Future  6. Vitamin D deficiency  -     VITAMIN D, 25 HYDROXY; Future  7. Screening for osteoporosis  -     DEXA BONE DENSITY STUDY AXIAL; Future  8. Postmenopausal  -     DEXA BONE DENSITY STUDY AXIAL; Future  9. Primary hypertension  -     lisinopriL (PRINIVIL, ZESTRIL) 5 mg tablet; Take 1 Tablet by mouth daily. , Normal, Disp-90 Tablet, R-0       Depression Risk Factor Screening     3 most recent PHQ Screens 6/24/2022   Little interest or pleasure in doing things Not at all   Feeling down, depressed, irritable, or hopeless Not at all   Total Score PHQ 2 0       Alcohol & Drug Abuse Risk Screen    Do you average more than 1 drink per night or more than 7 drinks a week:  No    On any one occasion in the past three months have you have had more than 3 drinks containing alcohol:  Yes          Functional Ability and Level of Safety    Hearing: Hearing is good. Activities of Daily Living: The home contains: no safety equipment.   ADL Assessment 6/24/2022   Feeding yourself No Help Needed   Getting from bed to chair No Help Needed   Getting dressed No Help Needed   Bathing or showering No Help Needed   Walk across the room (includes cane/walker) No Help Needed   Using the telphone No Help Needed   Taking your medications No Help Needed   Preparing meals No Help Needed   Managing money (expenses/bills) No Help Needed   Moderately strenuous housework (laundry) No Help Needed   Shopping for personal items (toiletries/medicines) No Help Needed   Shopping for groceries No Help Needed   Driving No Help Needed   Climbing a flight of stairs No Help Needed   Getting to places beyond walking distances No Help Needed         Ambulation: with mild difficulty     Fall Risk:  Fall Risk Assessment, last 12 mths 6/24/2022   Able to walk? Yes   Fall in past 12 months? 0   Do you feel unsteady? 0   Are you worried about falling 0   Is TUG test greater than 12 seconds? -   Is the gait abnormal? -   Number of falls in past 12 months -      Abuse Screen:  Patient is not abused       Cognitive Screening    Has your family/caregiver stated any concerns about your memory: no       Health Maintenance Due   There are no preventive care reminders to display for this patient.     Patient Care Team   Patient Care Team:  Rabia Colon MD as PCP - General (Family Medicine)  Rabia Colon MD as PCP - [facility] Empaneled Provider  Linda Hardy MD (Ophthalmology)  Terri Oliva MD (Orthopedic Surgery)    History     Patient Active Problem List   Diagnosis Code    Oral lichen planus Y97.5    Balance problem R26.89    Neuropathic postherpetic trigeminal neuralgia B02.22    Acquired hypothyroidism E03.9    HTN, goal below 150/90 I10    OAB (overactive bladder) N32.81    Excessive drinking alcohol F10.10    Osteopenia of multiple sites M85.89    Chronic renal disease, stage 3, moderately decreased glomerular filtration rate between 30-59 mL/min/1.73 square meter (HCC) N18.30    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.062    Allergic rhinoconjunctivitis, seasonal and perennial J30.2, H10.10, J30.89    Iron deficiency anemia D50.9     Past Medical History:   Diagnosis Date    Acquired hypothyroidism 11/28/2017    Allergic rhinoconjunctivitis, seasonal and perennial 9/5/2019    Arthritis     Balance problem 11/28/2017    Excessive drinking alcohol 11/29/2017    History of hormone replacement therapy 1987    stopped    HTN, goal below 150/90 11/28/2017    Iron deficiency anemia 22/48/1066    Lichen planus     Oral    Neuropathic postherpetic trigeminal neuralgia 11/28/2017    OAB (overactive bladder) 11/29/2017    Osteopenia of multiple sites 2/2/2018    dexa 1/18, started fosamax    Spinal stenosis of lumbar region with neurogenic claudication 2/6/2019      Past Surgical History:   Procedure Laterality Date    HX BREAST BIOPSY Left 2014    Stereo    HX COLONOSCOPY  2012    HX CYST INCISION AND DRAINAGE Right 04/16/2018    benign    HX KNEE REPLACEMENT Right 2014    HX ROTATOR CUFF REPAIR Right 2001    IR INJ FORAMIN EPID LUMB ANES/STER SNGL  2/20/2019    IR INJ FORAMIN EPID LUMB ANES/STER SNGL  6/21/2019    IR INJ FORAMIN EPID LUMB ANES/STER SNGL  12/20/2019    IR INJ FORAMIN EPID LUMB ANES/STER SNGL  11/17/2020     Current Outpatient Medications   Medication Sig Dispense Refill    lisinopriL (PRINIVIL, ZESTRIL) 5 mg tablet Take 1 Tablet by mouth daily. 90 Tablet 0    gabapentin (NEURONTIN) 100 mg capsule Take 2 Capsules by mouth three (3) times daily. Max Daily Amount: 600 mg. Fax [facility] [phone] 180 Capsule 2    metroNIDAZOLE (METROGEL) 1 % topical gel Apply  to affected area daily. Use a thin layer to affected areas after washing 45 g 2    doxycycline (VIBRAMYCIN) 100 mg capsule Take 100 mg by mouth two (2) times a day.  levothyroxine (SYNTHROID) 50 mcg tablet TAKE 1 TABLET BY MOUTH EVERY DAY 90 Tablet 3    loratadine (Claritin) 10 mg tablet Take 10 mg by mouth.       alendronate (FOSAMAX) 70 mg tablet Take 1 Tablet by mouth every seven (7) days. 13 Tablet 3    biotin 10,000 mcg cap Take  by mouth.  oxybutynin (DITROPAN) 5 mg tablet Take 1 Tab by mouth daily as needed. 90 Tab 1    CALCIUM CARB/VIT D2/MINERALS (CALTRATE PLUS PO) Take 500 mg by mouth daily. Allergies   Allergen Reactions    Voltaren [Diclofenac Sodium] Anaphylaxis    [place] Other (comments)    Iron Other (comments)     GI UPSET AND DIZZINESS     Lyrica [Pregabalin] Drowsiness    Neosporin (Neomycin-Polymyx) Other (comments)    Neosporin [Benzalkonium Chloride] Rash    Sulfa (Sulfonamide Antibiotics) Other (comments)       Family History   Problem Relation Age of Onset    Other Mother         severe hot flashes    Heart Failure Mother 76    Breast Cancer Mother 76    Other Daughter         severe hot flashes    Heart Attack Father 46    Breast Cancer Maternal Aunt      Social History     Tobacco Use    Smoking status: Former Smoker    Smokeless tobacco: Never Used   Substance Use Topics    Alcohol use: Yes     Alcohol/week: 14.0 standard drinks     Types: 8 Glasses of wine, 6 Shots of liquor per week     Comment: occ.          Elbert Epperson MD

## 2022-06-24 NOTE — PROGRESS NOTES
Patient Name: Alma Alcocer   MRN: [id number]    Lynette Quigley is a 80 y.o. female who presents with the following:     Reports 1 month history of pain in her right pinky toe. [facility] Dr[facility] Ash Lowe for this. Has been diagnosed with athlete's foot which she has been taking the cream and powder. Still has pain along that toe, especially when she wears her sneakers. Due for bone density test, currently on Fosamax since 2018. Gabapentin controls her trigeminal neuralgia symptoms. Home BP readings have been normal for her. She does have a history of whitecoat hypertension as well. BP Readings from Last 3 Encounters:   06/24/22 (!) 146/76   05/25/22 130/72   02/14/22 (!) 138/90     Wt Readings from Last 3 Encounters:   06/24/22 146 lb (66.2 kg)   05/25/22 148 lb 9.6 oz (67.4 kg)   02/14/22 145 lb (65.8 kg)       Review of Systems   Constitutional: Negative for fever, malaise/fatigue and weight loss. Respiratory: Negative for cough, hemoptysis, shortness of breath and wheezing. Cardiovascular: Negative for chest pain, palpitations, leg swelling and PND. Gastrointestinal: Negative for abdominal pain, constipation, diarrhea, nausea and vomiting. Musculoskeletal: Positive for joint pain. The patient's medications, allergies, past medical history, surgical history, family history and social history were reviewed and updated where appropriate. Current Outpatient Medications:     metroNIDAZOLE (METROGEL) 1 % topical gel, Apply  to affected area daily. Use a thin layer to affected areas after washing, Disp: 45 g, Rfl: 2    doxycycline (VIBRAMYCIN) 100 mg capsule, Take 100 mg by mouth two (2) times a day., Disp: , Rfl:     levothyroxine (SYNTHROID) 50 mcg tablet, TAKE 1 TABLET BY MOUTH EVERY DAY, Disp: 90 Tablet, Rfl: 3    lisinopriL (PRINIVIL, ZESTRIL) 5 mg tablet, Take 1 Tablet by mouth daily. , Disp: 90 Tablet, Rfl: 3    gabapentin (NEURONTIN) 100 mg capsule, Take 2 Capsules by mouth three (3) times daily. Max Daily Amount: 600 mg. Fax [facility] [phone], Disp: 180 Capsule, Rfl: 2    loratadine (Claritin) 10 mg tablet, Take 10 mg by mouth., Disp: , Rfl:     alendronate (FOSAMAX) 70 mg tablet, Take 1 Tablet by mouth every seven (7) days. , Disp: 13 Tablet, Rfl: 3    biotin 10,000 mcg cap, Take  by mouth., Disp: , Rfl:     oxybutynin (DITROPAN) 5 mg tablet, Take 1 Tab by mouth daily as needed. , Disp: 90 Tab, Rfl: 1    CALCIUM CARB/VIT D2/MINERALS (CALTRATE PLUS PO), Take 500 mg by mouth daily. , Disp: , Rfl:     cholecalciferol, vitamin D3, (Vitamin D3) 50 mcg (2,000 unit) tab, Take  by mouth. (Patient not taking: Reported on 6/24/2022), Disp: , Rfl:     Allergies   Allergen Reactions    Voltaren [Diclofenac Sodium] Anaphylaxis    [place] Other (comments)    Iron Other (comments)     GI UPSET AND DIZZINESS     Lyrica [Pregabalin] Drowsiness    Neosporin (Neomycin-Polymyx) Other (comments)    Neosporin [Benzalkonium Chloride] Rash    Sulfa (Sulfonamide Antibiotics) Other (comments)         OBJECTIVE    Visit Vitals  BP (!) 146/76 (BP 1 Location: Right arm, BP Patient Position: Sitting, BP Cuff Size: Adult)   Pulse 73   Temp 97.6 °F (36.4 °C) (Temporal)   Resp 16   Ht 5' 5\" (1.651 m)   Wt 146 lb (66.2 kg)   SpO2 99%   BMI 24.30 kg/m²       Physical Exam  Vitals and nursing note reviewed. Constitutional:       General: She is not in acute distress. Appearance: She is not diaphoretic. Eyes:      Conjunctiva/sclera: Conjunctivae normal.      Pupils: Pupils are equal, round, and reactive to light. Cardiovascular:      Rate and Rhythm: Normal rate and regular rhythm. Heart sounds: Normal heart sounds. No murmur heard. No friction rub. No gallop. Pulmonary:      Effort: Pulmonary effort is normal. No respiratory distress. Breath sounds: Normal breath sounds. No wheezing.    Musculoskeletal:      Comments: Callus noted between fourth and fifth digit. No surrounding rash or yeast infection. Skin:     General: Skin is warm and dry. Neurological:      Mental Status: She is alert. ASSESSMENT AND PLAN  Alena Magallon is a 80 y.o. female who presents today for:    1. Encounter for Medicare annual wellness exam    2. Hypercholesterolemia  Will calculate ASCVD risk score pending labs. - CBC W/O DIFF; Future  - LIPID PANEL; Future  - METABOLIC PANEL, COMPREHENSIVE; Future  - METABOLIC PANEL, COMPREHENSIVE  - LIPID PANEL  - CBC W/O DIFF    3. Callus between toes  Recommend corn pads and f/u with podiatry.  - REFERRAL TO PODIATRY    4. Neuropathic postherpetic trigeminal neuralgia  Stable, continue current treatment.  - gabapentin (NEURONTIN) 100 mg capsule; Take 2 Capsules by mouth three (3) times daily. Max Daily Amount: 600 mg. Fax [employer] [phone]  Dispense: 180 Capsule; Refill: 2    5. Acquired hypothyroidism  - TSH 3RD GENERATION; Future  - T4 (THYROXINE); Future  - T4 (THYROXINE)  - TSH 3RD GENERATION    6. Vitamin D deficiency  - VITAMIN D, 25 HYDROXY; Future  - VITAMIN D, 25 HYDROXY    7. Screening for osteoporosis  - DEXA BONE DENSITY STUDY AXIAL; Future    8. Postmenopausal  - DEXA BONE DENSITY STUDY AXIAL; Future    9. Primary hypertension  Stable, continue current treatment. - lisinopriL (PRINIVIL, ZESTRIL) 5 mg tablet; Take 1 Tablet by mouth daily. Dispense: 90 Tablet; Refill: 0       Medications Discontinued During This Encounter   Medication Reason    cholecalciferol, vitamin D3, (Vitamin D3) 50 mcg (2,000 unit) tab LIST CLEANUP    lisinopriL (PRINIVIL, ZESTRIL) 5 mg tablet REORDER    gabapentin (NEURONTIN) 100 mg capsule REORDER       Follow-up and Dispositions    · Return in about 6 months (around 12/24/2022) for HTN follow up. Treatment risks/benefits/costs/interactions/alternatives discussed with patient.   Advised patient to call back or return to office if symptoms worsen/change/persist. If patient cannot reach us or should anything more severe/urgent arise he/she should proceed directly to the nearest emergency department. Discussed expected course/resolution/complications of diagnosis in detail with patient. Patient expressed understanding with the diagnosis and plan. This dictation may have been completed with Dragon, the computer voice recognition software. Unanticipated grammatical, syntax, homophones, and other interpretive errors are sometimes inadvertently transcribed by the computer software. Please disregard any errors that have escaped final proofreading. Aivi N. Caroleen Bamberger M.D.

## 2022-06-28 ENCOUNTER — TELEPHONE (OUTPATIENT)
Dept: FAMILY MEDICINE CLINIC | Age: 87
End: 2022-06-28

## 2022-06-28 NOTE — TELEPHONE ENCOUNTER
Patient called stated it is very important that she speaks to the nurse today.     Requesting a call back    Best call back #[phone]

## 2022-06-28 NOTE — TELEPHONE ENCOUNTER
Patient called stated her foot is swollen and stated she can not put her shoe on. Was just into see provider.     Requesting a call back    Best call back #[phone]

## 2022-06-29 ENCOUNTER — HOSPITAL ENCOUNTER (EMERGENCY)
Age: 87
Discharge: HOME | End: 2022-06-29
Attending: EMERGENCY MEDICINE
Payer: MEDICARE

## 2022-06-29 ENCOUNTER — APPOINTMENT (OUTPATIENT)
Dept: GENERAL RADIOLOGY | Age: 87
End: 2022-06-29
Attending: EMERGENCY MEDICINE
Payer: MEDICARE

## 2022-06-29 VITALS
TEMPERATURE: 97.6 F | DIASTOLIC BLOOD PRESSURE: 70 MMHG | WEIGHT: 146.83 LBS | HEART RATE: 99 BPM | OXYGEN SATURATION: 98 % | BODY MASS INDEX: 24.46 KG/M2 | RESPIRATION RATE: 16 BRPM | HEIGHT: 65 IN | SYSTOLIC BLOOD PRESSURE: 122 MMHG

## 2022-06-29 DIAGNOSIS — L84 CALLUS BETWEEN TOES: Primary | ICD-10-CM

## 2022-06-29 PROCEDURE — 73630 X-RAY EXAM OF FOOT: CPT

## 2022-06-29 PROCEDURE — 99283 EMERGENCY DEPT VISIT LOW MDM: CPT

## 2022-06-29 NOTE — ED PROVIDER NOTES
60-year-old female presents with right small toe pain. Patient reports ongoing pain for several weeks. She has noticed a callus in between her small toe and her fourth toe. She denies any other symptoms or trauma.            Past Medical History:   Diagnosis Date    Acquired hypothyroidism 11/28/2017    Allergic rhinoconjunctivitis, seasonal and perennial 9/5/2019    Arthritis     Balance problem 11/28/2017    Excessive drinking alcohol 11/29/2017    History of hormone replacement therapy 1987    stopped    HTN, goal below 150/90 11/28/2017    Iron deficiency anemia 86/14/8146    Lichen planus     Oral    Neuropathic postherpetic trigeminal neuralgia 11/28/2017    OAB (overactive bladder) 11/29/2017    Osteopenia of multiple sites 2/2/2018    dexa 1/18, started fosamax    Spinal stenosis of lumbar region with neurogenic claudication 2/6/2019       Past Surgical History:   Procedure Laterality Date    HX BREAST BIOPSY Left 2014    Stereo    HX COLONOSCOPY  2012    HX CYST INCISION AND DRAINAGE Right 04/16/2018    benign    HX KNEE REPLACEMENT Right 2014    HX ROTATOR CUFF REPAIR Right 2001    IR INJ FORAMIN EPID LUMB ANES/STER SNGL  2/20/2019    IR INJ FORAMIN EPID LUMB ANES/STER SNGL  6/21/2019    IR INJ FORAMIN EPID LUMB ANES/STER SNGL  12/20/2019    IR INJ FORAMIN EPID LUMB ANES/STER SNGL  11/17/2020         Family History:   Problem Relation Age of Onset    Other Mother         severe hot flashes    Heart Failure Mother 76    Breast Cancer Mother 76    Other Daughter         severe hot flashes    Heart Attack Father 46    Breast Cancer Maternal Aunt        Social History     Socioeconomic History    Marital status:      Spouse name: Not on file    Number of children: Not on file    Years of education: Not on file    Highest education level: Not on file   Occupational History    Not on file   Tobacco Use    Smoking status: Former Smoker    Smokeless tobacco: Never Used Vaping Use    Vaping Use: Never used   Substance and Sexual Activity    Alcohol use: Yes     Alcohol/week: 14.0 standard drinks     Types: 8 Glasses of wine, 6 Shots of liquor per week     Comment: occ.  Drug use: No    Sexual activity: Not Currently   Other Topics Concern    Not on file   Social History Narrative    Lives in cottage, independent living, [place]    Daughter Alex Current lives nearby     Social Determinants of Health     Financial Resource Strain:     Difficulty of Paying Living Expenses: Not on file   Food Insecurity:     Worried About Running Out of Food in the Last Year: Not on file    Lonnie of Food in the Last Year: Not on file   Transportation Needs:     Lack of Transportation (Medical): Not on file    Lack of Transportation (Non-Medical): Not on file   Physical Activity:     Days of Exercise per Week: Not on file    Minutes of Exercise per Session: Not on file   Stress:     Feeling of Stress : Not on file   Social Connections:     Frequency of Communication with Friends and Family: Not on file    Frequency of Social Gatherings with Friends and Family: Not on file    Attends Restorationism Services: Not on file    Active Member of [address] [place] [address] or Organizations: Not on file    Attends Club or Organization Meetings: Not on file    Marital Status: Not on file   Intimate Partner Violence:     Fear of Current or Ex-Partner: Not on file    Emotionally Abused: Not on file    Physically Abused: Not on file    Sexually Abused: Not on file   Housing Stability:     Unable to Pay for Housing in the Last Year: Not on file    Number of [place] in the Last Year: Not on file    Unstable Housing in the Last Year: Not on file         ALLERGIES: Voltaren [diclofenac sodium], [place], Iron, Lyrica [pregabalin], Neosporin (neomycin-polymyx), Neosporin [benzalkonium chloride], and Sulfa (sulfonamide antibiotics)    Review of Systems   Constitutional: Negative for fever. Musculoskeletal: Positive for arthralgias. Vitals:    06/29/22 1007 06/29/22 1012 06/29/22 1013   BP: (!) 142/77     Pulse: (!) 101     Resp: 16     Temp: 97.6 °F (36.4 °C)     SpO2: 97% 97% 98%   Weight: 66.6 kg (146 lb 13.2 oz)     Height: 5' 5\" (1.651 m)              Physical Exam  Vitals and nursing note reviewed. Constitutional:       General: She is not in acute distress. Appearance: Normal appearance. She is not ill-appearing, toxic-appearing or diaphoretic. HENT:      Head: Normocephalic and atraumatic. Eyes:      Extraocular Movements: Extraocular movements intact. Cardiovascular:      Rate and Rhythm: Normal rate. Pulses: Normal pulses. Pulmonary:      Effort: Pulmonary effort is normal. No respiratory distress. Abdominal:      General: There is no distension. Musculoskeletal:         General: Normal range of motion. Cervical back: Normal range of motion. Skin:     General: Skin is dry. Comments: Callus on the medial aspect of the right small toe. Neurological:      Mental Status: She is alert and oriented to person, place, and time. Psychiatric:         Mood and Affect: Mood normal.          MDM  Number of Diagnoses or Management Options  Callus between toes  Diagnosis management comments:     Plain film x-ray obtained to rule out fracture. Plain film negative. Patient will be discharged for follow-up with Dr. Rachel Mcknight in clinic this morning at 1130. Discussed my clinical impression(s), any labs and/or radiology results with the patient. I answered any questions and addressed any concerns. Discussed the importance of following up with their primary care physician and/or specialist(s). Discussed signs or symptoms that would warrant return back to the ER for further evaluation. The patient is agreeable with discharge.          Procedures

## 2022-06-29 NOTE — ED NOTES
The patient left the Emergency Department via wheelchair, alert and oriented and in no acute distress. The patient was encouraged to call or return to the ED for worsening issues or problems and was encouraged to schedule a follow up appointment for continuing care. The patient verbalized understanding of discharge instructions , all questions were answered. The patient has no further concerns at this time.

## 2022-06-29 NOTE — ED TRIAGE NOTES
Patient reports 5th digit pain on the right foot x 5 weeks. Patient reports unable to wear shoes due to pain. Reports doing several treatments for athletes foot, but the pain is persist with redness and some swelling. Patient is ambulatory in Triage.

## 2022-07-19 ENCOUNTER — PATIENT MESSAGE (OUTPATIENT)
Dept: FAMILY MEDICINE CLINIC | Age: 87
End: 2022-07-19

## 2022-07-21 ENCOUNTER — TELEPHONE (OUTPATIENT)
Dept: FAMILY MEDICINE CLINIC | Age: 87
End: 2022-07-21

## 2022-07-21 RX ORDER — FLUOCINONIDE 0.5 MG/G
OINTMENT TOPICAL
Qty: 15 G | Refills: 0 | Status: SHIPPED | OUTPATIENT
Start: 2022-07-21

## 2022-07-21 NOTE — TELEPHONE ENCOUNTER
----- Message from Queta Amiechristine sent at 7/21/2022  2:04 PM EDT -----  Subject: Message to Provider    QUESTIONS  Information for Provider? Patient Son Emmanuelle Forrester is calling in requsting if pcp   can write a letter for patient to have long term care . Patient son stated   that the letter has to explain her having trouble getting dressed in the   mornings as well and her having troubles walking if can please advise   patient [facility] long term care phone number [phone] policy number   [id number] claim :[id number]   ---------------------------------------------------------------------------  --------------  [address]  [phone]; OK to leave message on [employer], Matias Childs to respond with   electronic message via [employer] portal (only for patients who have   registered [employer] account)  ---------------------------------------------------------------------------  --------------  SCRIPT ANSWERS  Relationship to Patient? Other  Representative Name? Reece   Is the Representative on the appropriate HIPAA document in Epic?  Yes

## 2022-07-21 NOTE — TELEPHONE ENCOUNTER
Pito Alberto 7/20/2022 7:46 AM EDT      ----- Message -----  From: Zheng Castañeda  Sent: 7/19/2022 4:48 PM EDT  To: Jannie Dover  Subject: Ramy Churchill again     OK here it is! my dermatologist in \"my other life\" ( and I had forgotten), gave me fluocinide 0.05% to stop the itch. I would use it as he directed to calm the itch, it worked. Could you put in an RX to  for a tube of the gel as it is easier to apply. Thanks for your help and concern.  Norma Parker

## 2022-07-21 NOTE — TELEPHONE ENCOUNTER
Is there specific paperwork required? I can take a look at the forms they need and determine if she needs another appt from there.  They often ask specific questions so a visit may be needed but I can look at any forms first.

## 2022-07-22 NOTE — TELEPHONE ENCOUNTER
Son transferred from [facility]. States that he only needs a letter stating that patient cannot dress independently, needs help with ambulation, and needs help with getting out of her chair (son phrased as light transfer of systems). Informed son that provider would be sent message. Son verbalized understanding.

## 2022-07-22 NOTE — TELEPHONE ENCOUNTER
Called patient in attempt to discuss paperwork/letter. Patient stated that she will call back, she is in the grocery store.

## 2022-07-25 NOTE — TELEPHONE ENCOUNTER
Called [facility] and was on hold for 20 minutes. Could not wait long enough for an . Will call again at another time.

## 2022-07-27 ENCOUNTER — HOSPITAL ENCOUNTER (OUTPATIENT)
Dept: MAMMOGRAPHY | Age: 87
Discharge: HOME | End: 2022-07-27
Attending: FAMILY MEDICINE
Payer: MEDICARE

## 2022-07-27 DIAGNOSIS — Z13.820 SCREENING FOR OSTEOPOROSIS: ICD-10-CM

## 2022-07-27 DIAGNOSIS — Z78.0 POSTMENOPAUSAL: ICD-10-CM

## 2022-07-27 PROCEDURE — 77080 DXA BONE DENSITY AXIAL: CPT

## 2022-07-29 NOTE — PROGRESS NOTES
Please call patient:    Bone density test does show improving in bone density since start Fosamax. She is almost at her 5 year mil on this medication so would continue Fosamax until 2/2023; she can stop it then. Repeat DEXA in 2 years.

## 2022-08-01 ENCOUNTER — PATIENT MESSAGE (OUTPATIENT)
Dept: FAMILY MEDICINE CLINIC | Age: 87
End: 2022-08-01

## 2022-08-04 ENCOUNTER — TELEPHONE (OUTPATIENT)
Dept: FAMILY MEDICINE CLINIC | Age: 87
End: 2022-08-04

## 2022-08-04 NOTE — TELEPHONE ENCOUNTER
Called patient and confirmed two identifiers. Informed her that letter was written and signed by provider. She wanted letter mailed to son. Went to get address, line cut out after 3 min of holding.

## 2022-09-13 ENCOUNTER — PATIENT MESSAGE (OUTPATIENT)
Dept: FAMILY MEDICINE CLINIC | Age: 87
End: 2022-09-13

## 2022-09-13 DIAGNOSIS — I10 PRIMARY HYPERTENSION: ICD-10-CM

## 2022-09-13 NOTE — TELEPHONE ENCOUNTER
From: Helen Hudson  To: Lien Workman MD  Sent: 9/13/2022 12:43 PM EDT  Subject: RX    Hi ! Please call in to [facility] a prescription for Lisinopril 5MG. Almost out.    Thank you, José Antonio Drew

## 2022-10-14 ENCOUNTER — OFFICE VISIT (OUTPATIENT)
Dept: FAMILY MEDICINE CLINIC | Age: 87
End: 2022-10-14
Payer: MEDICARE

## 2022-10-14 VITALS
HEART RATE: 66 BPM | SYSTOLIC BLOOD PRESSURE: 164 MMHG | RESPIRATION RATE: 16 BRPM | WEIGHT: 145.5 LBS | TEMPERATURE: 98.6 F | DIASTOLIC BLOOD PRESSURE: 72 MMHG | BODY MASS INDEX: 24.24 KG/M2 | OXYGEN SATURATION: 98 % | HEIGHT: 65 IN

## 2022-10-14 DIAGNOSIS — I10 HTN, GOAL BELOW 150/90: ICD-10-CM

## 2022-10-14 DIAGNOSIS — R53.83 FATIGUE, UNSPECIFIED TYPE: Primary | ICD-10-CM

## 2022-10-14 DIAGNOSIS — E03.9 ACQUIRED HYPOTHYROIDISM: ICD-10-CM

## 2022-10-14 DIAGNOSIS — B02.22 NEUROPATHIC POSTHERPETIC TRIGEMINAL NEURALGIA: ICD-10-CM

## 2022-10-14 DIAGNOSIS — R20.9 SENSATION OF COLD IN LEG: ICD-10-CM

## 2022-10-14 PROCEDURE — G8536 NO DOC ELDER MAL SCRN: HCPCS | Performed by: FAMILY MEDICINE

## 2022-10-14 PROCEDURE — 99214 OFFICE O/P EST MOD 30 MIN: CPT | Performed by: FAMILY MEDICINE

## 2022-10-14 PROCEDURE — G8427 DOCREV CUR MEDS BY ELIG CLIN: HCPCS | Performed by: FAMILY MEDICINE

## 2022-10-14 PROCEDURE — 1101F PT FALLS ASSESS-DOCD LE1/YR: CPT | Performed by: FAMILY MEDICINE

## 2022-10-14 PROCEDURE — G8420 CALC BMI NORM PARAMETERS: HCPCS | Performed by: FAMILY MEDICINE

## 2022-10-14 PROCEDURE — 1090F PRES/ABSN URINE INCON ASSESS: CPT | Performed by: FAMILY MEDICINE

## 2022-10-14 PROCEDURE — G0463 HOSPITAL OUTPT CLINIC VISIT: HCPCS | Performed by: FAMILY MEDICINE

## 2022-10-14 PROCEDURE — 1123F ACP DISCUSS/DSCN MKR DOCD: CPT | Performed by: FAMILY MEDICINE

## 2022-10-14 PROCEDURE — G8510 SCR DEP NEG, NO PLAN REQD: HCPCS | Performed by: FAMILY MEDICINE

## 2022-10-14 RX ORDER — LISINOPRIL 2.5 MG/1
TABLET ORAL
Qty: 14 TABLET | Refills: 0 | Status: SHIPPED | OUTPATIENT
Start: 2022-10-14 | End: 2022-10-14 | Stop reason: SDUPTHER

## 2022-10-14 RX ORDER — LISINOPRIL 2.5 MG/1
TABLET ORAL
Qty: 90 TABLET | Refills: 3 | Status: SHIPPED | OUTPATIENT
Start: 2022-10-14

## 2022-10-14 NOTE — PROGRESS NOTES
Patient Name: Levar Warren   MRN: [id number]    Paulette Herrera is a 80 y.o. female who presents with the following:     Reports 3 months of fatigue. Unsure if it is due to gabapentin. Using gabapentin for trigeminal neuralgia; thinks that it might make her sleepy. Home BPs have been above 140/60. Noticed that when she sits, her lower legs feel cold. Resolves when she stands up. BP Readings from Last 3 Encounters:   10/14/22 (!) 164/72   06/29/22 122/70   06/24/22 (!) 146/76     Wt Readings from Last 3 Encounters:   10/14/22 145 lb 8 oz (66 kg)   06/29/22 146 lb 13.2 oz (66.6 kg)   06/24/22 146 lb (66.2 kg)     Review of Systems   Constitutional:  Positive for malaise/fatigue. Negative for fever and weight loss. Respiratory:  Negative for cough, hemoptysis, shortness of breath and wheezing. Cardiovascular:  Negative for chest pain, palpitations, leg swelling and PND. Gastrointestinal:  Negative for abdominal pain, constipation, diarrhea, nausea and vomiting. The patient's medications, allergies, past medical history, surgical history, family history and social history were reviewed and updated where appropriate. Current Outpatient Medications:     lisinopriL (PRINIVIL, ZESTRIL) 5 mg tablet, TAKE 1 TABLET BY MOUTH EVERY DAY, Disp: 90 Tablet, Rfl: 3    fluocinoNIDE (LIDEX) 0.05 % ointment, Apply  to affected area daily as needed for Skin Irritation. , Disp: 15 g, Rfl: 0    gabapentin (NEURONTIN) 100 mg capsule, Take 2 Capsules by mouth three (3) times daily. Max Daily Amount: 600 mg. Fax [facility] [phone], Disp: 180 Capsule, Rfl: 2    alendronate (FOSAMAX) 70 mg tablet, Take 1 Tablet by mouth every seven (7) days. , Disp: 13 Tablet, Rfl: 3    levothyroxine (SYNTHROID) 50 mcg tablet, TAKE 1 TABLET BY MOUTH EVERY DAY, Disp: 90 Tablet, Rfl: 3    metroNIDAZOLE (METROGEL) 1 % topical gel, Apply  to affected area daily.  Use a thin layer to affected areas after washing, Disp: 45 g, Rfl: 2    doxycycline (VIBRAMYCIN) 100 mg capsule, Take 100 mg by mouth two (2) times a day., Disp: , Rfl:     loratadine (CLARITIN) 10 mg tablet, Take 10 mg by mouth., Disp: , Rfl:     biotin 10,000 mcg cap, Take  by mouth., Disp: , Rfl:     oxybutynin (DITROPAN) 5 mg tablet, Take 1 Tab by mouth daily as needed. , Disp: 90 Tab, Rfl: 1    CALCIUM CARB/VIT D2/MINERALS (CALTRATE PLUS PO), Take 500 mg by mouth daily. , Disp: , Rfl:     Allergies   Allergen Reactions    Voltaren [Diclofenac Sodium] Anaphylaxis    [place] Other (comments)    Iron Other (comments)     GI UPSET AND DIZZINESS     Lyrica [Pregabalin] Drowsiness    Neosporin (Neomycin-Polymyx) Other (comments)    Neosporin [Benzalkonium Chloride] Rash    Sulfa (Sulfonamide Antibiotics) Other (comments)         OBJECTIVE    Visit Vitals  BP (!) 164/72 (BP 1 Location: Right arm, BP Patient Position: Sitting, BP Cuff Size: Adult)   Pulse 66   Temp 98.6 °F (37 °C) (Temporal)   Resp 16   Ht 5' 5\" (1.651 m)   Wt 145 lb 8 oz (66 kg)   SpO2 98%   BMI 24.21 kg/m²       Physical Exam      ASSESSMENT AND PLAN  Aidee Mejia is a 80 y.o. female who presents today for:    1. Fatigue, unspecified type  May be due to high BP or gabapentin. Will obtain labs. - CBC W/O DIFF; Future  - METABOLIC PANEL, COMPREHENSIVE; Future  - TSH 3RD GENERATION; Future  - T4 (THYROXINE); Future  - T4 (THYROXINE)  - TSH 3RD GENERATION  - METABOLIC PANEL, COMPREHENSIVE  - CBC W/O DIFF    2. Neuropathic postherpetic trigeminal neuralgia  Recommend reducing gabapentin dose to 1 tab in AM and 2 tabs in the afternoon/evenings; would space dosing out equally. 3. Acquired hypothyroidism  Stable, continue current treatment pending review of labs. - TSH 3RD GENERATION; Future  - T4 (THYROXINE); Future  - T4 (THYROXINE)  - TSH 3RD GENERATION    4. HTN, goal below 150/90  Will add on 2.5 mg for total daily dose of 7.5 mg daily.     5. Sensation of cold in leg  Possibly positional dependent; continue to monitor. Medications Discontinued During This Encounter   Medication Reason    lisinopriL (PRINIVIL, ZESTRIL) 2.5 mg tablet REORDER       Follow-up and Dispositions    Return in about 4 weeks (around 11/11/2022) for HTN follow up. Treatment risks/benefits/costs/interactions/alternatives discussed with patient. Advised patient to call back or return to office if symptoms worsen/change/persist. If patient cannot reach us or should anything more severe/urgent arise he/she should proceed directly to the nearest emergency department. Discussed expected course/resolution/complications of diagnosis in detail with patient. Patient expressed understanding with the diagnosis and plan. This dictation may have been completed with Dragon, the computer voice recognition software. Unanticipated grammatical, syntax, homophones, and other interpretive errors are sometimes inadvertently transcribed by the computer software. Please disregard any errors that have escaped final proofreading. Jong Reyes M.D.

## 2022-10-14 NOTE — PROGRESS NOTES
Chief Complaint   Patient presents with    Hypertension     140ish/60ish for the last week        1. \"Have you been to the ER, urgent care clinic since your last visit? Hospitalized since your last visit? \" No    2. \"Have you seen or consulted any other health care providers outside of the [address] [place] [address] since your last visit? \" No     3. For patients aged 39-70: Has the patient had a colonoscopy / FIT/ Cologuard? NA - based on age      If the patient is female:    4. For patients aged 41-77: Has the patient had a mammogram within the past 2 years? NA - based on age or sex      11. For patients aged 21-65: Has the patient had a pap smear?  NA - based on age or sex    3 most recent PHQ Screens 10/14/2022   Little interest or pleasure in doing things Not at all   Feeling down, depressed, irritable, or hopeless Not at all   Total Score PHQ 2 0

## 2022-10-15 LAB
ALBUMIN SERPL-MCNC: 3.9 G/DL (ref 3.5–5)
ALBUMIN/GLOB SERPL: 1.2 {RATIO} (ref 1.1–2.2)
ALP SERPL-CCNC: 90 U/L (ref 45–117)
ALT SERPL-CCNC: 16 U/L (ref 12–78)
ANION GAP SERPL CALC-SCNC: 5 MMOL/L (ref 5–15)
AST SERPL-CCNC: 9 U/L (ref 15–37)
BILIRUB SERPL-MCNC: 0.6 MG/DL (ref 0.2–1)
BUN SERPL-MCNC: 19 MG/DL (ref 6–20)
BUN/CREAT SERPL: 17 (ref 12–20)
CALCIUM SERPL-MCNC: 9.5 MG/DL (ref 8.5–10.1)
CHLORIDE SERPL-SCNC: 103 MMOL/L (ref 97–108)
CO2 SERPL-SCNC: 28 MMOL/L (ref 21–32)
CREAT SERPL-MCNC: 1.1 MG/DL (ref 0.55–1.02)
ERYTHROCYTE [DISTWIDTH] IN BLOOD BY AUTOMATED COUNT: 13.9 % (ref 11.5–14.5)
GLOBULIN SER CALC-MCNC: 3.3 G/DL (ref 2–4)
GLUCOSE SERPL-MCNC: 92 MG/DL (ref 65–100)
HCT VFR BLD AUTO: 46.6 % (ref 35–47)
HGB BLD-MCNC: 15.1 G/DL (ref 11.5–16)
MCH RBC QN AUTO: 30.8 PG (ref 26–34)
MCHC RBC AUTO-ENTMCNC: 32.4 G/DL (ref 30–36.5)
MCV RBC AUTO: 95.1 FL (ref 80–99)
NRBC # BLD: 0 K/UL (ref 0–0.01)
NRBC BLD-RTO: 0 PER 100 WBC
PLATELET # BLD AUTO: 284 K/UL (ref 150–400)
PMV BLD AUTO: 10.5 FL (ref 8.9–12.9)
POTASSIUM SERPL-SCNC: 4.7 MMOL/L (ref 3.5–5.1)
PROT SERPL-MCNC: 7.2 G/DL (ref 6.4–8.2)
RBC # BLD AUTO: 4.9 M/UL (ref 3.8–5.2)
SODIUM SERPL-SCNC: 136 MMOL/L (ref 136–145)
T4 SERPL-MCNC: 10.4 UG/DL (ref 4.8–13.9)
TSH SERPL DL<=0.05 MIU/L-ACNC: 1.81 UIU/ML (ref 0.36–3.74)
WBC # BLD AUTO: 8.4 K/UL (ref 3.6–11)

## 2022-10-25 ENCOUNTER — PATIENT MESSAGE (OUTPATIENT)
Dept: FAMILY MEDICINE CLINIC | Age: 87
End: 2022-10-25

## 2022-10-25 DIAGNOSIS — B02.22 NEUROPATHIC POSTHERPETIC TRIGEMINAL NEURALGIA: ICD-10-CM

## 2022-10-25 RX ORDER — GABAPENTIN 100 MG/1
CAPSULE ORAL
Qty: 150 CAPSULE | Refills: 2 | Status: SHIPPED | OUTPATIENT
Start: 2022-10-25

## 2022-10-25 NOTE — TELEPHONE ENCOUNTER
From: Alec Jauregui  To: Janel Valdez MD  Sent: 10/25/2022 12:06 PM EDT  Subject: Results & meds    I received your message about blood test and am following your advice staying hydrated and deleting all but tylenol. Also taking gabapentin 100mg as you directed. Just filled pill boxes and will need refill for gabapentin as it takes about 10 days to get here with .  Thanks so much for your help and caring, Jasmin Guerrero!!!

## 2022-11-14 ENCOUNTER — OFFICE VISIT (OUTPATIENT)
Dept: FAMILY MEDICINE CLINIC | Age: 87
End: 2022-11-14
Payer: MEDICARE

## 2022-11-14 VITALS
TEMPERATURE: 98.1 F | SYSTOLIC BLOOD PRESSURE: 140 MMHG | DIASTOLIC BLOOD PRESSURE: 82 MMHG | BODY MASS INDEX: 23.32 KG/M2 | HEART RATE: 111 BPM | OXYGEN SATURATION: 99 % | RESPIRATION RATE: 16 BRPM | HEIGHT: 65 IN | WEIGHT: 140 LBS

## 2022-11-14 DIAGNOSIS — R00.0 TACHYCARDIA: ICD-10-CM

## 2022-11-14 DIAGNOSIS — I10 HTN, GOAL BELOW 150/90: Primary | ICD-10-CM

## 2022-11-14 DIAGNOSIS — M25.562 ACUTE PAIN OF LEFT KNEE: ICD-10-CM

## 2022-11-14 DIAGNOSIS — Z91.09 ENVIRONMENTAL ALLERGIES: ICD-10-CM

## 2022-11-14 PROCEDURE — G8536 NO DOC ELDER MAL SCRN: HCPCS | Performed by: FAMILY MEDICINE

## 2022-11-14 PROCEDURE — 93010 ELECTROCARDIOGRAM REPORT: CPT | Performed by: FAMILY MEDICINE

## 2022-11-14 PROCEDURE — 1123F ACP DISCUSS/DSCN MKR DOCD: CPT | Performed by: FAMILY MEDICINE

## 2022-11-14 PROCEDURE — 93005 ELECTROCARDIOGRAM TRACING: CPT | Performed by: FAMILY MEDICINE

## 2022-11-14 PROCEDURE — G0463 HOSPITAL OUTPT CLINIC VISIT: HCPCS | Performed by: FAMILY MEDICINE

## 2022-11-14 PROCEDURE — G8432 DEP SCR NOT DOC, RNG: HCPCS | Performed by: FAMILY MEDICINE

## 2022-11-14 PROCEDURE — 99214 OFFICE O/P EST MOD 30 MIN: CPT | Performed by: FAMILY MEDICINE

## 2022-11-14 PROCEDURE — G8420 CALC BMI NORM PARAMETERS: HCPCS | Performed by: FAMILY MEDICINE

## 2022-11-14 PROCEDURE — 1090F PRES/ABSN URINE INCON ASSESS: CPT | Performed by: FAMILY MEDICINE

## 2022-11-14 PROCEDURE — 1101F PT FALLS ASSESS-DOCD LE1/YR: CPT | Performed by: FAMILY MEDICINE

## 2022-11-14 PROCEDURE — G8427 DOCREV CUR MEDS BY ELIG CLIN: HCPCS | Performed by: FAMILY MEDICINE

## 2022-11-14 NOTE — PROGRESS NOTES
Chief Complaint   Patient presents with    Hypertension    Knee Pain     Left knee pain; 2/10 pain today     Allergies     Patient is worried about the filters in her apartment not being change frequently enough causing exacerbating of allergies       1. \"Have you been to the ER, urgent care clinic since your last visit? Hospitalized since your last visit? \" No    2. \"Have you seen or consulted any other health care providers outside of the [address] [place] [address] since your last visit? \" No     3. For patients aged 39-70: Has the patient had a colonoscopy / FIT/ Cologuard? Yes - no Care Gap present      If the patient is female:    4. For patients aged 41-77: Has the patient had a mammogram within the past 2 years? Yes - no Care Gap present      5. For patients aged 21-65: Has the patient had a pap smear?  Yes - no Care Gap present    3 most recent PHQ Screens 10/14/2022   Little interest or pleasure in doing things Not at all   Feeling down, depressed, irritable, or hopeless Not at all   Total Score PHQ 2 0

## 2022-11-14 NOTE — PROGRESS NOTES
Patient Name: Enrrique Jackson   MRN: [id number]    Gregory Klein is a 80 y.o. female who presents with the following:     Patient states that 2 weeks ago, she was traveling via car 2.5 hours. When she stood up, her left knee was swollen and almost gave out. Since then, it has been hurting especially when she bends it while sitting. Using ice and Tylenol. Would like to start physical therapy. Since last visit, increase her lisinopril from 5 mg to 7.5 mg daily. Her heart rate is also high today. She states that she is uncomfortable due to her ongoing knee pain. She lives in a independent living facility. Often has sneezing in her home. Her landlord only changes her air filters about once per year and she would like to have it changed every 3 months. BP Readings from Last 3 Encounters:   11/14/22 (!) 140/82   10/14/22 (!) 164/72   06/29/22 122/70     Review of Systems   Constitutional:  Negative for fever, malaise/fatigue and weight loss. Respiratory:  Negative for cough, hemoptysis, shortness of breath and wheezing. Cardiovascular:  Negative for chest pain, palpitations, leg swelling and PND. Gastrointestinal:  Negative for abdominal pain, constipation, diarrhea, nausea and vomiting. Musculoskeletal:  Positive for joint pain. The patient's medications, allergies, past medical history, surgical history, family history and social history were reviewed and updated where appropriate.       Current Outpatient Medications:     gabapentin (NEURONTIN) 100 mg capsule, Take 1 tab in the morning, 2 tabs in the afternoon, and 2 tabs in the evening (spaced equally apart), Disp: 150 Capsule, Rfl: 2    lisinopriL (PRINIVIL, ZESTRIL) 2.5 mg tablet, Take 2.5 mg with 5 mg once a day for total daily dose of 7.5 mg/day., Disp: 90 Tablet, Rfl: 3    lisinopriL (PRINIVIL, ZESTRIL) 5 mg tablet, TAKE 1 TABLET BY MOUTH EVERY DAY, Disp: 90 Tablet, Rfl: 3    fluocinoNIDE (LIDEX) 0.05 % ointment, Apply  to affected area daily as needed for Skin Irritation. , Disp: 15 g, Rfl: 0    alendronate (FOSAMAX) 70 mg tablet, Take 1 Tablet by mouth every seven (7) days. , Disp: 13 Tablet, Rfl: 3    levothyroxine (SYNTHROID) 50 mcg tablet, TAKE 1 TABLET BY MOUTH EVERY DAY, Disp: 90 Tablet, Rfl: 3    metroNIDAZOLE (METROGEL) 1 % topical gel, Apply  to affected area daily. Use a thin layer to affected areas after washing, Disp: 45 g, Rfl: 2    doxycycline (VIBRAMYCIN) 100 mg capsule, Take 100 mg by mouth two (2) times a day., Disp: , Rfl:     loratadine (CLARITIN) 10 mg tablet, Take 10 mg by mouth., Disp: , Rfl:     biotin 10,000 mcg cap, Take  by mouth., Disp: , Rfl:     oxybutynin (DITROPAN) 5 mg tablet, Take 1 Tab by mouth daily as needed. , Disp: 90 Tab, Rfl: 1    CALCIUM CARB/VIT D2/MINERALS (CALTRATE PLUS PO), Take 500 mg by mouth daily. , Disp: , Rfl:     Allergies   Allergen Reactions    Voltaren [Diclofenac Sodium] Anaphylaxis    [place] Other (comments)    Iron Other (comments)     GI UPSET AND DIZZINESS     Lyrica [Pregabalin] Drowsiness    Neosporin (Neomycin-Polymyx) Other (comments)    Neosporin [Benzalkonium Chloride] Rash    Sulfa (Sulfonamide Antibiotics) Other (comments)       OBJECTIVE    Visit Vitals  BP (!) 140/82 (BP 1 Location: Left upper arm, BP Patient Position: Sitting, BP Cuff Size: Adult)   Pulse (!) 111   Temp 98.1 °F (36.7 °C) (Temporal)   Resp 16   Ht 5' 5\" (1.651 m)   Wt 140 lb (63.5 kg)   SpO2 99%   BMI 23.30 kg/m²       Physical Exam  Vitals and nursing note reviewed. Constitutional:       General: She is not in acute distress. Appearance: She is not diaphoretic. Eyes:      Conjunctiva/sclera: Conjunctivae normal.      Pupils: Pupils are equal, round, and reactive to light. Cardiovascular:      Rate and Rhythm: Normal rate and regular rhythm. Heart sounds: Normal heart sounds. No murmur heard. No friction rub. No gallop.    Pulmonary:      Effort: Pulmonary effort is normal. No respiratory distress. Breath sounds: Normal breath sounds. No wheezing. Musculoskeletal:      Left knee: Swelling (mild swelling around knee joint) present. No deformity, ecchymosis or bony tenderness. Decreased range of motion. Tenderness present. Skin:     General: Skin is warm and dry. Neurological:      Mental Status: She is alert. ASSESSMENT AND PLAN  Mara Engel is a 80 y.o. female who presents today for:    1. HTN, goal below 150/90  Mildly high today; continue to monitor. 2. Tachycardia  EKG NSR; suspect due to ongoing pain. Will continue to monitor.  - AMB POC EKG ROUTINE W/ 12 LEADS, INTER & REP    3. Acute pain of left knee  Start PT.  - REFERRAL TO PHYSICAL THERAPY    4. Environmental allergies  Pt will let me know if she needs a letter of support to allow more frequent air filter replacements. There are no discontinued medications. Follow-up and Dispositions    Return in about 3 months (around 2/14/2023) for HTN follow up. Treatment risks/benefits/costs/interactions/alternatives discussed with patient. Advised patient to call back or return to office if symptoms worsen/change/persist. If patient cannot reach us or should anything more severe/urgent arise he/she should proceed directly to the nearest emergency department. Discussed expected course/resolution/complications of diagnosis in detail with patient. Patient expressed understanding with the diagnosis and plan. This dictation may have been completed with Dragon, the computer voice recognition software. Unanticipated grammatical, syntax, homophones, and other interpretive errors are sometimes inadvertently transcribed by the computer software. Please disregard any errors that have escaped final proofreading. Jong Nuñez M.D.

## 2022-11-17 ENCOUNTER — PATIENT MESSAGE (OUTPATIENT)
Dept: FAMILY MEDICINE CLINIC | Age: 87
End: 2022-11-17

## 2022-11-17 DIAGNOSIS — M25.562 ACUTE PAIN OF LEFT KNEE: Primary | ICD-10-CM

## 2022-11-18 NOTE — TELEPHONE ENCOUNTER
Darius Sullivan 11/17/2022 1:35 PM EST    She's literally the best patient ever. Do you need to order the MRI? Was this discussed?   ----- Message -----  From: Najose Krysta  Sent: 11/17/2022 12:04 PM EST  To: [facility] Nurse Pool  Subject: MRI     With family input and [place] observations, I am choosing to have the MRI to determine the problem with which I am dealing. This is so PT will know how to proceed. Last visit both good and bad. Good with EKG ok and caring doctor, bad as [facility] will no longer be my portal pal. [facility], wishing you the best and you have a wonderful pleasant manner which will serve you well. I go to the [facility] in [place].  Thanks, [facility]

## 2022-11-21 ENCOUNTER — PATIENT MESSAGE (OUTPATIENT)
Dept: FAMILY MEDICINE CLINIC | Age: 87
End: 2022-11-21

## 2022-11-21 DIAGNOSIS — M25.562 ACUTE PAIN OF LEFT KNEE: Primary | ICD-10-CM

## 2022-12-05 ENCOUNTER — HOSPITAL ENCOUNTER (OUTPATIENT)
Dept: MRI IMAGING | Age: 87
Discharge: HOME | End: 2022-12-05
Attending: FAMILY MEDICINE
Payer: MEDICARE

## 2022-12-05 DIAGNOSIS — S83.232A COMPLEX TEAR OF MEDIAL MENISCUS OF LEFT KNEE AS CURRENT INJURY, INITIAL ENCOUNTER: Primary | ICD-10-CM

## 2022-12-05 DIAGNOSIS — M25.562 ACUTE PAIN OF LEFT KNEE: ICD-10-CM

## 2022-12-05 PROCEDURE — 73721 MRI JNT OF LWR EXTRE W/O DYE: CPT

## 2022-12-15 ENCOUNTER — OFFICE VISIT (OUTPATIENT)
Dept: FAMILY MEDICINE CLINIC | Age: 87
End: 2022-12-15
Payer: MEDICARE

## 2022-12-15 VITALS
RESPIRATION RATE: 16 BRPM | HEIGHT: 65 IN | HEART RATE: 80 BPM | TEMPERATURE: 97.4 F | OXYGEN SATURATION: 99 % | SYSTOLIC BLOOD PRESSURE: 168 MMHG | BODY MASS INDEX: 24.16 KG/M2 | WEIGHT: 145 LBS | DIASTOLIC BLOOD PRESSURE: 80 MMHG

## 2022-12-15 DIAGNOSIS — R39.89 SUSPECTED UTI: Primary | ICD-10-CM

## 2022-12-15 LAB
BILIRUB UR QL STRIP: NEGATIVE
GLUCOSE UR-MCNC: NEGATIVE MG/DL
KETONES P FAST UR STRIP-MCNC: NEGATIVE MG/DL
PH UR STRIP: 5.5 [PH] (ref 4.6–8)
PROT UR QL STRIP: NEGATIVE
SP GR UR STRIP: 1.01 (ref 1–1.03)
UA UROBILINOGEN AMB POC: NORMAL (ref 0.2–1)
URINALYSIS CLARITY POC: NORMAL
URINALYSIS COLOR POC: NORMAL
URINE BLOOD POC: NORMAL
URINE LEUKOCYTES POC: NORMAL
URINE NITRITES POC: NEGATIVE

## 2022-12-15 PROCEDURE — G0463 HOSPITAL OUTPT CLINIC VISIT: HCPCS | Performed by: NURSE PRACTITIONER

## 2022-12-15 PROCEDURE — 81002 URINALYSIS NONAUTO W/O SCOPE: CPT | Performed by: NURSE PRACTITIONER

## 2022-12-15 RX ORDER — CEPHALEXIN 500 MG/1
500 CAPSULE ORAL 4 TIMES DAILY
Qty: 20 CAPSULE | Refills: 0 | Status: SHIPPED | OUTPATIENT
Start: 2022-12-15 | End: 2022-12-20

## 2022-12-15 NOTE — PROGRESS NOTES
Chief Complaint   Patient presents with    UTI         1. \"Have you been to the ER, urgent care clinic since your last visit? Hospitalized since your last visit? \" No    2. \"Have you seen or consulted any other health care providers outside of the [address] [place] [address] since your last visit? \" Yes Where: dr Javi Ulloa      3. For patients over 45: Has the patient had a colonoscopy? Yes - no Care Gap present     If the patient is female:    4. For patients over 40: Has the patient had a mammogram? Yes - no Care Gap present    5. For patients over 21: Has the patient had a pap smear? Yes - no Care Gap present     3 most recent PHQ Screens 10/14/2022   Little interest or pleasure in doing things Not at all   Feeling down, depressed, irritable, or hopeless Not at all   Total Score PHQ 2 0       There are no preventive care reminders to display for this patient.

## 2022-12-15 NOTE — PROGRESS NOTES
[facility] Note     Alec Jauregui (: 1929) is a 80 y.o. female, established patient, here for evaluation of the following chief complaint(s):  UTI (Burning with urination)       ASSESSMENT/PLAN:  1. Suspected UTI  -     cephALEXin (KEFLEX) 500 mg capsule; Take 1 Capsule by mouth four (4) times daily for 5 days. , Normal, Disp-20 Capsule, R-0  -     AMB POC URINALYSIS DIP STICK MANUAL W/O MICRO  -     CULTURE, URINE; Future      Return if symptoms worsen or fail to improve. SUBJECTIVE/OBJECTIVE:    Alec Jauregui is a 80 y.o. female seen today for suspected UTI    Urinary Symptoms  Patient complains of dysuria, frequency, urgency, burning with urination. Onset was this morning. Patient denies back pain and fever. Patient does have a history of recurrent UTI. Patient does not have a history of pyelonephritis. REVIEW OF SYSTEMS:    Review of Systems   Genitourinary:  Positive for dysuria, frequency and urgency. Negative for hematuria. All other systems reviewed and are negative. VITAL SIGNS:    Wt Readings from Last 3 Encounters:   12/15/22 145 lb (65.8 kg)   22 140 lb (63.5 kg)   10/14/22 145 lb 8 oz (66 kg)     Temp Readings from Last 3 Encounters:   12/15/22 97.4 °F (36.3 °C) (Temporal)   22 98.1 °F (36.7 °C) (Temporal)   10/14/22 98.6 °F (37 °C) (Temporal)     BP Readings from Last 3 Encounters:   12/15/22 (!) 168/80   22 (!) 140/82   10/14/22 (!) 164/72     Pulse Readings from Last 3 Encounters:   12/15/22 80   22 (!) 111   10/14/22 66           PHYSICAL EXAMINATION:       General: Alert, cooperative, no distress  Respiratory: Breathing comfortably, in no acute respiratory distress. Abdomen: Soft, non-tender, not distended. Bowel sounds normal. No masses or organomegaly. MSK: No CVA tenderness. Skin: Skin color, texture, turgor normal. No rashes or lesions on exposed skin. Neurologic: A/Ox3  Psychiatric: Normal affect.  Mood euthymic. Thoughts logical. Speech volume and speed normal            Treatment risks/benefits/costs/interactions/alternatives discussed with patient. Advised patient to call back or return to office if symptoms worsen/change/persist. If patient cannot reach us or should anything more severe/urgent arise he/she should proceed directly to the nearest emergency department. Discussed expected course/resolution/complications of diagnosis in detail with patient. Patient expressed understanding with the diagnosis and plan. An electronic signature was used to authenticate this note.   -- Manisha Lopez NP

## 2022-12-17 LAB
BACTERIA SPEC CULT: ABNORMAL
CC UR VC: ABNORMAL
SERVICE CMNT-IMP: ABNORMAL

## 2022-12-27 ENCOUNTER — TELEPHONE (OUTPATIENT)
Dept: FAMILY MEDICINE CLINIC | Age: 87
End: 2022-12-27

## 2022-12-27 NOTE — TELEPHONE ENCOUNTER
Called lab. There were not enough colonies (only [phone]), if there are less than [phone] they do not automatically do a sensitivity.  Providers can request them but in this case it has been too long so sample was discarded on 12/24.    ----- Message from Jason Blake NP sent at 12/27/2022  9:52 AM EST -----  Could you please call the lab and inquire as to why a sensitivity was not done?  ----- Message -----  From: Ilan De La Rosa [employer]  Sent: 12/17/2022   6:17 AM EST  To: Jason Blake NP

## 2023-01-04 ENCOUNTER — OFFICE VISIT (OUTPATIENT)
Dept: FAMILY MEDICINE CLINIC | Age: 88
End: 2023-01-04
Payer: MEDICARE

## 2023-01-04 VITALS
RESPIRATION RATE: 18 BRPM | OXYGEN SATURATION: 98 % | BODY MASS INDEX: 24.76 KG/M2 | TEMPERATURE: 97.3 F | SYSTOLIC BLOOD PRESSURE: 130 MMHG | WEIGHT: 148.6 LBS | DIASTOLIC BLOOD PRESSURE: 80 MMHG | HEIGHT: 65 IN | HEART RATE: 69 BPM

## 2023-01-04 DIAGNOSIS — N30.01 ACUTE CYSTITIS WITH HEMATURIA: Primary | ICD-10-CM

## 2023-01-04 DIAGNOSIS — R35.0 FREQUENCY OF URINATION: ICD-10-CM

## 2023-01-04 LAB
BILIRUB UR QL STRIP: NEGATIVE
GLUCOSE UR-MCNC: NEGATIVE MG/DL
KETONES P FAST UR STRIP-MCNC: NEGATIVE MG/DL
PH UR STRIP: 7 [PH] (ref 4.6–8)
PROT UR QL STRIP: NEGATIVE
SP GR UR STRIP: 1.02 (ref 1–1.03)
UA UROBILINOGEN AMB POC: NORMAL (ref 0.2–1)
URINALYSIS CLARITY POC: NORMAL
URINALYSIS COLOR POC: NORMAL
URINE BLOOD POC: NORMAL
URINE LEUKOCYTES POC: NORMAL
URINE NITRITES POC: NEGATIVE

## 2023-01-04 PROCEDURE — G0463 HOSPITAL OUTPT CLINIC VISIT: HCPCS | Performed by: STUDENT IN AN ORGANIZED HEALTH CARE EDUCATION/TRAINING PROGRAM

## 2023-01-04 PROCEDURE — 1090F PRES/ABSN URINE INCON ASSESS: CPT | Performed by: STUDENT IN AN ORGANIZED HEALTH CARE EDUCATION/TRAINING PROGRAM

## 2023-01-04 PROCEDURE — 1123F ACP DISCUSS/DSCN MKR DOCD: CPT | Performed by: STUDENT IN AN ORGANIZED HEALTH CARE EDUCATION/TRAINING PROGRAM

## 2023-01-04 PROCEDURE — G8536 NO DOC ELDER MAL SCRN: HCPCS | Performed by: STUDENT IN AN ORGANIZED HEALTH CARE EDUCATION/TRAINING PROGRAM

## 2023-01-04 PROCEDURE — G8420 CALC BMI NORM PARAMETERS: HCPCS | Performed by: STUDENT IN AN ORGANIZED HEALTH CARE EDUCATION/TRAINING PROGRAM

## 2023-01-04 PROCEDURE — 81003 URINALYSIS AUTO W/O SCOPE: CPT | Performed by: STUDENT IN AN ORGANIZED HEALTH CARE EDUCATION/TRAINING PROGRAM

## 2023-01-04 PROCEDURE — G8510 SCR DEP NEG, NO PLAN REQD: HCPCS | Performed by: STUDENT IN AN ORGANIZED HEALTH CARE EDUCATION/TRAINING PROGRAM

## 2023-01-04 PROCEDURE — 1101F PT FALLS ASSESS-DOCD LE1/YR: CPT | Performed by: STUDENT IN AN ORGANIZED HEALTH CARE EDUCATION/TRAINING PROGRAM

## 2023-01-04 PROCEDURE — G8427 DOCREV CUR MEDS BY ELIG CLIN: HCPCS | Performed by: STUDENT IN AN ORGANIZED HEALTH CARE EDUCATION/TRAINING PROGRAM

## 2023-01-04 PROCEDURE — 99213 OFFICE O/P EST LOW 20 MIN: CPT | Performed by: STUDENT IN AN ORGANIZED HEALTH CARE EDUCATION/TRAINING PROGRAM

## 2023-01-04 RX ORDER — CIPROFLOXACIN 250 MG/1
250 TABLET, FILM COATED ORAL EVERY 12 HOURS
Qty: 6 TABLET | Refills: 0 | Status: SHIPPED | OUTPATIENT
Start: 2023-01-04 | End: 2023-01-07

## 2023-01-04 NOTE — PROGRESS NOTES
Leanne Melgar  80 y.o. female  5/2/1929  [address] [facility],[address] [address]  [id number]     BHUMIKA Miguel [address]ry [address]       Chief Complaint: concern for UTI  Source: self    Subjective  Leanne Melgar is an 80 y.o. female who presents for urinary frequency starting 2 days ago. No blood in the urine but some mild pain with urination. No fever, chills, nausea vomiting. Allergies - reviewed: Allergies   Allergen Reactions    Voltaren [Diclofenac Sodium] Anaphylaxis    [place] Other (comments)    Iron Other (comments)     GI UPSET AND DIZZINESS     Lyrica [Pregabalin] Drowsiness    Neosporin (Neomycin-Polymyx) Other (comments)    Neosporin [Benzalkonium Chloride] Rash    Sulfa (Sulfonamide Antibiotics) Other (comments)         Medications - reviewed:   Current Outpatient Medications   Medication Sig    ciprofloxacin HCl (CIPRO) 250 mg tablet Take 1 Tablet by mouth every twelve (12) hours for 3 days. gabapentin (NEURONTIN) 100 mg capsule Take 1 tab in the morning, 2 tabs in the afternoon, and 2 tabs in the evening (spaced equally apart)    lisinopriL (PRINIVIL, ZESTRIL) 2.5 mg tablet Take 2.5 mg with 5 mg once a day for total daily dose of 7.5 mg/day. lisinopriL (PRINIVIL, ZESTRIL) 5 mg tablet TAKE 1 TABLET BY MOUTH EVERY DAY    fluocinoNIDE (LIDEX) 0.05 % ointment Apply  to affected area daily as needed for Skin Irritation. alendronate (FOSAMAX) 70 mg tablet Take 1 Tablet by mouth every seven (7) days. levothyroxine (SYNTHROID) 50 mcg tablet TAKE 1 TABLET BY MOUTH EVERY DAY    metroNIDAZOLE (METROGEL) 1 % topical gel Apply  to affected area daily. Use a thin layer to affected areas after washing    doxycycline (VIBRAMYCIN) 100 mg capsule Take 100 mg by mouth two (2) times a day. loratadine (CLARITIN) 10 mg tablet Take 10 mg by mouth.    biotin 10,000 mcg cap Take  by mouth. oxybutynin (DITROPAN) 5 mg tablet Take 1 Tab by mouth daily as needed.     CALCIUM CARB/VIT D2/MINERALS (CALTRATE PLUS PO) Take 500 mg by mouth daily. No current facility-administered medications for this visit. Past Medical History - reviewed:  Past Medical History:   Diagnosis Date    Acquired hypothyroidism 11/28/2017    Allergic rhinoconjunctivitis, seasonal and perennial 9/5/2019    Arthritis     Balance problem 11/28/2017    Excessive drinking alcohol 11/29/2017    History of hormone replacement therapy 1987    stopped    HTN, goal below 150/90 11/28/2017    Iron deficiency anemia 17/74/4540    Lichen planus     Oral    Neuropathic postherpetic trigeminal neuralgia 11/28/2017    OAB (overactive bladder) 11/29/2017    Osteopenia of multiple sites 2/2/2018    dexa 1/18, started fosamax    Spinal stenosis of lumbar region with neurogenic claudication 2/6/2019         Past Surgical History - reviewed:   Past Surgical History:   Procedure Laterality Date    HX BREAST BIOPSY Left 2014    Stereo    HX COLONOSCOPY  2012    HX CYST INCISION AND DRAINAGE Right 04/16/2018    benign    HX KNEE REPLACEMENT Right 2014    HX ROTATOR CUFF REPAIR Right 2001    IR INJ FORAMIN EPID LUMB ANES/STER SNGL  2/20/2019    IR INJ FORAMIN EPID LUMB ANES/STER SNGL  6/21/2019    IR INJ FORAMIN EPID LUMB ANES/STER SNGL  12/20/2019    IR INJ FORAMIN EPID LUMB ANES/STER SNGL  11/17/2020         Social History - reviewed:  Social History     Socioeconomic History    Marital status:      Spouse name: Not on file    Number of children: Not on file    Years of education: Not on file    Highest education level: Not on file   Occupational History    Not on file   Tobacco Use    Smoking status: Former     Passive exposure: Never    Smokeless tobacco: Never   Vaping Use    Vaping Use: Never used   Substance and Sexual Activity    Alcohol use: Yes     Alcohol/week: 14.0 standard drinks     Types: 8 Glasses of wine, 6 Shots of liquor per week     Comment: occ.     Drug use: No    Sexual activity: Not Currently   Other Topics Concern    Not on file   Social History Narrative    Lives in cottage, independent living, [place]    Daughter Alayna Daily lives nearby     Social Determinants of Health     Financial Resource Strain: Low Risk     Difficulty of Paying Living Expenses: Not hard at all   Food Insecurity: No Food Insecurity    Worried About Running Out of Food in the Last Year: Never true    Ran Out of Food in the Last Year: Never true   Transportation Needs: Not on file   Physical Activity: Not on file   Stress: Not on file   Social Connections: Not on file   Intimate Partner Violence: Not on file   Housing Stability: Not on file         Family History - reviewed:  Family History   Problem Relation Age of Onset    Other Mother         severe hot flashes    Heart Failure Mother 76    Breast Cancer Mother 76    Other Daughter         severe hot flashes    Heart Attack Father 46    Breast Cancer Maternal Aunt          Immunizations - reviewed:   Immunization History   Administered Date(s) Administered     Influenza, FLUZONE (age 72 y+), High Dose 09/28/2020    COVID-19, MODERNA Booster BLUE border, (age 18y+), IM, 50mcg/0.25mL 05/20/2022, 10/11/2022    COVID-19, PFIZER PURPLE top, DILUTE for use, (age 15 y+), IM, 30mcg/0.3mL 01/11/2021, 02/01/2021, 10/08/2021    Influenza High Dose Vaccine PF 10/20/2021, 10/11/2022    Influenza Vaccine 09/18/2017, 09/28/2020    Influenza Vaccine (Tri) Adjuvanted (>65 Yrs FLUAD TRI 94434) 10/19/2018, 09/05/2019    Pneumococcal Conjugate (PCV-13) 12/05/2018    Pneumococcal Polysaccharide (PPSV-23) 12/12/2019    Tdap 07/19/2018    Zoster Recombinant 03/19/2019, 07/09/2019       Review of Systems   Constitutional:  Negative for chills and fever. Genitourinary:  Positive for dysuria and urgency. Negative for flank pain, frequency and hematuria. Musculoskeletal:  Negative for myalgias. Neurological:  Negative for dizziness and headaches.        Physical Exam  Visit Vitals  /80   Pulse 69 Temp 97.3 °F (36.3 °C) (Temporal)   Resp 18   Ht 5' 5\" (1.651 m)   Wt 148 lb 9.6 oz (67.4 kg)   SpO2 98%   BMI 24.73 kg/m²       Physical Exam  Vitals and nursing note reviewed. Constitutional:       General: She is not in acute distress. Appearance: Normal appearance. She is normal weight. She is not ill-appearing, toxic-appearing or diaphoretic. Abdominal:      Comments: No suprapubic tenderness    Neurological:      General: No focal deficit present. Mental Status: She is alert and oriented to person, place, and time. Psychiatric:         Mood and Affect: Mood normal.         Behavior: Behavior normal.         Thought Content: Thought content normal.         Judgment: Judgment normal.     Labs - personally reviewed and interpreted  Recent Results (from the past 12 hour(s))   AMB POC URINALYSIS DIP STICK AUTO W/O MICRO    Collection Time: 01/04/23 11:46 AM   Result Value Ref Range    Color (UA POC) Dark Meghan     Clarity (UA POC) Cloudy     Glucose (UA POC) Negative Negative    Bilirubin (UA POC) Negative Negative    Ketones (UA POC) Negative Negative    Specific gravity (UA POC) 1.020 1.001 - 1.035    Blood (UA POC) Trace Negative    pH (UA POC) 7.0 4.6 - 8.0    Protein (UA POC) Negative Negative    Urobilinogen (UA POC) 0.2 mg/dL 0.2 - 1    Nitrites (UA POC) Negative Negative    Leukocyte esterase (UA POC) 2+ Negative         Assessment/Plan    ICD-10-CM ICD-9-CM    1. Acute cystitis with hematuria  N30.01 595.0 CULTURE, URINE      ciprofloxacin HCl (CIPRO) 250 mg tablet      CULTURE, URINE      2. Frequency of urination  R35.0 788.41 AMB POC URINALYSIS DIP STICK AUTO W/O MICRO          Tim Floyd is an 80 y.o. female here today with UTI. Prior UCx with pan-sensitive E Coli will treat based on the same. 1. Frequency of urination  - AMB POC URINALYSIS DIP STICK AUTO W/O MICRO    2. Acute cystitis with hematuria  - ciprofloxacin HCl (CIPRO) 250 mg tablet;  Take 1 Tablet by mouth every twelve (12) hours for 3 days. Dispense: 6 Tablet; Refill: 0  - CULTURE, URINE    Patient informed to follow up: Follow-up and Dispositions    Return for routine care with Dr Kade Darby. I have discussed the diagnosis with the patient and the intended plan as seen in the above orders. Patient verbalized understanding of the plan and agrees with the plan. The patient has received an after-visit summary and questions were answered concerning future plans. I have discussed medication side effects and warnings with the patient as well. Informed patient to return to the office if new symptoms arise.       Grazyna Herrera MD  [facility]

## 2023-01-04 NOTE — PROGRESS NOTES
Chief Complaint   Patient presents with    Urinary Frequency     Along with burning x 3 days. 1. \"Have you been to the ER, urgent care clinic since your last visit? Hospitalized since your last visit? \" Yes July DX: Foot pain [facility]    2. \"Have you seen or consulted any other health care providers outside of the [address] [place] [address] since your last visit? \" No     3. For patients aged 39-70: Has the patient had a colonoscopy / FIT/ Cologuard? NA - based on age      If the patient is female:    4. For patients aged 41-77: Has the patient had a mammogram within the past 2 years? NA - based on age or sex      11. For patients aged 21-65: Has the patient had a pap smear? NA - based on age or sex         3 most recent PHQ Screens 1/4/2023   Little interest or pleasure in doing things Not at all   Feeling down, depressed, irritable, or hopeless Not at all   Total Score PHQ 2 0       There are no preventive care reminders to display for this patient.

## 2023-01-07 LAB
BACTERIA SPEC CULT: ABNORMAL
CC UR VC: ABNORMAL
SERVICE CMNT-IMP: ABNORMAL

## 2023-01-16 ENCOUNTER — PATIENT MESSAGE (OUTPATIENT)
Dept: FAMILY MEDICINE CLINIC | Age: 88
End: 2023-01-16

## 2023-01-16 DIAGNOSIS — B02.22 NEUROPATHIC POSTHERPETIC TRIGEMINAL NEURALGIA: ICD-10-CM

## 2023-01-17 RX ORDER — GABAPENTIN 100 MG/1
CAPSULE ORAL
Qty: 150 CAPSULE | Refills: 0 | Status: SHIPPED | OUTPATIENT
Start: 2023-01-17

## 2023-02-13 ENCOUNTER — OFFICE VISIT (OUTPATIENT)
Dept: FAMILY MEDICINE CLINIC | Age: 88
End: 2023-02-13
Payer: MEDICARE

## 2023-02-13 VITALS
BODY MASS INDEX: 25.36 KG/M2 | DIASTOLIC BLOOD PRESSURE: 70 MMHG | WEIGHT: 152.2 LBS | HEART RATE: 74 BPM | TEMPERATURE: 98.2 F | OXYGEN SATURATION: 98 % | RESPIRATION RATE: 16 BRPM | SYSTOLIC BLOOD PRESSURE: 170 MMHG | HEIGHT: 65 IN

## 2023-02-13 DIAGNOSIS — N18.30 STAGE 3 CHRONIC KIDNEY DISEASE, UNSPECIFIED WHETHER STAGE 3A OR 3B CKD (HCC): ICD-10-CM

## 2023-02-13 DIAGNOSIS — I10 HTN, GOAL BELOW 150/90: Primary | ICD-10-CM

## 2023-02-13 PROCEDURE — 1101F PT FALLS ASSESS-DOCD LE1/YR: CPT | Performed by: FAMILY MEDICINE

## 2023-02-13 PROCEDURE — G8536 NO DOC ELDER MAL SCRN: HCPCS | Performed by: FAMILY MEDICINE

## 2023-02-13 PROCEDURE — G8417 CALC BMI ABV UP PARAM F/U: HCPCS | Performed by: FAMILY MEDICINE

## 2023-02-13 PROCEDURE — G0463 HOSPITAL OUTPT CLINIC VISIT: HCPCS | Performed by: FAMILY MEDICINE

## 2023-02-13 PROCEDURE — 1090F PRES/ABSN URINE INCON ASSESS: CPT | Performed by: FAMILY MEDICINE

## 2023-02-13 PROCEDURE — G8427 DOCREV CUR MEDS BY ELIG CLIN: HCPCS | Performed by: FAMILY MEDICINE

## 2023-02-13 PROCEDURE — 99213 OFFICE O/P EST LOW 20 MIN: CPT | Performed by: FAMILY MEDICINE

## 2023-02-13 PROCEDURE — 1123F ACP DISCUSS/DSCN MKR DOCD: CPT | Performed by: FAMILY MEDICINE

## 2023-02-13 PROCEDURE — G8510 SCR DEP NEG, NO PLAN REQD: HCPCS | Performed by: FAMILY MEDICINE

## 2023-02-13 RX ORDER — FLUOCINONIDE 0.5 MG/G
OINTMENT TOPICAL
Qty: 15 G | Refills: 0 | Status: SHIPPED | OUTPATIENT
Start: 2023-02-13

## 2023-02-13 NOTE — PROGRESS NOTES
Patient Name: Rubin Viveros   MRN: [id number]    Yaw Feldman is a 80 y.o. female who presents with the following:     Home BP readings are b/t 112-134/58-62. Uses a wrist cuff. Gets nervous at [address] as well as when she checks her BP at home. BP Readings from Last 3 Encounters:   02/13/23 (!) 170/70   01/04/23 130/80   12/15/22 (!) 168/80       Review of Systems   Constitutional:  Negative for fever, malaise/fatigue and weight loss. Respiratory:  Negative for cough, hemoptysis, shortness of breath and wheezing. Cardiovascular:  Negative for chest pain, palpitations, leg swelling and PND. Gastrointestinal:  Negative for abdominal pain, constipation, diarrhea, nausea and vomiting. The patient's medications, allergies, past medical history, surgical history, family history and social history were reviewed and updated where appropriate. Current Outpatient Medications:     fluocinoNIDE (LIDEX) 0.05 % ointment, Apply  to affected area daily as needed for Skin Irritation. , Disp: 15 g, Rfl: 0    gabapentin (NEURONTIN) 100 mg capsule, Take 1 tab in the morning, 2 tabs in the afternoon, and 2 tabs in the evening (spaced equally apart), Disp: 150 Capsule, Rfl: 0    lisinopriL (PRINIVIL, ZESTRIL) 2.5 mg tablet, Take 2.5 mg with 5 mg once a day for total daily dose of 7.5 mg/day., Disp: 90 Tablet, Rfl: 3    lisinopriL (PRINIVIL, ZESTRIL) 5 mg tablet, TAKE 1 TABLET BY MOUTH EVERY DAY, Disp: 90 Tablet, Rfl: 3    alendronate (FOSAMAX) 70 mg tablet, Take 1 Tablet by mouth every seven (7) days. , Disp: 13 Tablet, Rfl: 3    levothyroxine (SYNTHROID) 50 mcg tablet, TAKE 1 TABLET BY MOUTH EVERY DAY, Disp: 90 Tablet, Rfl: 3    metroNIDAZOLE (METROGEL) 1 % topical gel, Apply  to affected area daily.  Use a thin layer to affected areas after washing, Disp: 45 g, Rfl: 2    doxycycline (VIBRAMYCIN) 100 mg capsule, Take 100 mg by mouth two (2) times a day., Disp: , Rfl:     loratadine (CLARITIN) 10 mg tablet, Take 10 mg by mouth., Disp: , Rfl:     biotin 10,000 mcg cap, Take  by mouth., Disp: , Rfl:     oxybutynin (DITROPAN) 5 mg tablet, Take 1 Tab by mouth daily as needed. , Disp: 90 Tab, Rfl: 1    CALCIUM CARB/VIT D2/MINERALS (CALTRATE PLUS PO), Take 500 mg by mouth daily. , Disp: , Rfl:     Allergies   Allergen Reactions    Voltaren [Diclofenac Sodium] Anaphylaxis    [place] Other (comments)    Iron Other (comments)     GI UPSET AND DIZZINESS     Lyrica [Pregabalin] Drowsiness    Neosporin (Neomycin-Polymyx) Other (comments)    Neosporin [Benzalkonium Chloride] Rash    Sulfa (Sulfonamide Antibiotics) Other (comments)       OBJECTIVE    Visit Vitals  BP (!) 170/70   Pulse 74   Temp 98.2 °F (36.8 °C) (Temporal)   Resp 16   Ht 5' 5\" (1.651 m)   Wt 152 lb 3.2 oz (69 kg)   SpO2 98%   BMI 25.33 kg/m²       Physical Exam  Vitals and nursing note reviewed. Constitutional:       General: She is not in acute distress. Appearance: She is not diaphoretic. Eyes:      Conjunctiva/sclera: Conjunctivae normal.      Pupils: Pupils are equal, round, and reactive to light. Cardiovascular:      Rate and Rhythm: Normal rate and regular rhythm. Heart sounds: Normal heart sounds. No murmur heard. No friction rub. No gallop. Pulmonary:      Effort: Pulmonary effort is normal. No respiratory distress. Breath sounds: Normal breath sounds. No wheezing. Skin:     General: Skin is warm and dry. Neurological:      Mental Status: She is alert. ASSESSMENT AND PLAN  Sophia Rincon is a 80 y.o. female who presents today for:    1. HTN, goal below 150/90  Possible white coat HTN with HTN. Pt to check her BP with an arm cuff at home and let me know readings. Keep medications the same for now. 2. Stage 3 chronic kidney disease, unspecified whether stage 3a or 3b CKD ([address]  Stable, continue current treatment.          Medications Discontinued During This Encounter   Medication Reason    fluocinoNIDE (LIDEX) 0.05 % ointment REORDER       Follow-up and Dispositions    Return in about 4 months (around 6/13/2023) for HTN follow up. Treatment risks/benefits/costs/interactions/alternatives discussed with patient. Advised patient to call back or return to office if symptoms worsen/change/persist. If patient cannot reach us or should anything more severe/urgent arise he/she should proceed directly to the nearest emergency department. Discussed expected course/resolution/complications of diagnosis in detail with patient. Patient expressed understanding with the diagnosis and plan. This dictation may have been completed with Dragon, the [employer] voice recognition software. Unanticipated grammatical, syntax, homophones, and other interpretive errors are sometimes inadvertently transcribed by the computer software. Please disregard any errors that have escaped final proofreading. Jong Lyle M.D.

## 2023-02-13 NOTE — PROGRESS NOTES
Chief Complaint   Patient presents with    Hypertension     Follow up       1. \"Have you been to the ER, urgent care clinic since your last visit? Hospitalized since your last visit? \" No    2. \"Have you seen or consulted any other health care providers outside of the [address] [place] [address] since your last visit? \" No     3. For patients aged 39-70: Has the patient had a colonoscopy? NA - based on age     If the patient is female:    4. For patients aged 41-77: Has the patient had a mammogram within the past 2 years? NA - based on age    11. For patients aged 21-30: Has the patient had a pap smear?  NA - based on age     1 most recent PHQ Screens 2/13/2023   Little interest or pleasure in doing things Not at all   Feeling down, depressed, irritable, or hopeless Not at all   Total Score PHQ 2 0

## 2023-02-14 ENCOUNTER — PATIENT MESSAGE (OUTPATIENT)
Dept: FAMILY MEDICINE CLINIC | Age: 88
End: 2023-02-14

## 2023-02-14 DIAGNOSIS — B02.22 NEUROPATHIC POSTHERPETIC TRIGEMINAL NEURALGIA: ICD-10-CM

## 2023-02-14 RX ORDER — GABAPENTIN 100 MG/1
CAPSULE ORAL
Qty: 150 CAPSULE | Refills: 2 | Status: SHIPPED | OUTPATIENT
Start: 2023-02-14

## 2023-02-20 ENCOUNTER — PATIENT MESSAGE (OUTPATIENT)
Dept: FAMILY MEDICINE CLINIC | Age: 88
End: 2023-02-20

## 2023-02-20 DIAGNOSIS — I10 PRIMARY HYPERTENSION: ICD-10-CM

## 2023-02-22 NOTE — TELEPHONE ENCOUNTER
From: Betsey Moon  To: Tana Rayo MD  Sent: 2/20/2023 11:24 AM EST  Subject: BP info    Hi, hope this is more helpful than confusing! Each day I took several readings with rest after each. Tues range, 161//92 Hbl986/90----139/70 just up ( then 139/69 after physical therapy at 1:15) Thurs, 151/83 Fri. 139/77 Sat. 161/74---136/72   Sun.157//81 Mon. 148/74-----133/70. ... hope this is helpful.  Happy President's Cj Addison

## 2023-02-23 RX ORDER — LISINOPRIL 10 MG/1
10 TABLET ORAL DAILY
Qty: 90 TABLET | Refills: 3 | Status: SHIPPED | OUTPATIENT
Start: 2023-02-23

## 2023-05-12 ENCOUNTER — PATIENT MESSAGE (OUTPATIENT)
Age: 88
End: 2023-05-12

## 2023-05-12 DIAGNOSIS — B02.22 POSTHERPETIC TRIGEMINAL NEURALGIA: Primary | ICD-10-CM

## 2023-05-12 RX ORDER — FLUOCINONIDE 0.5 MG/G
OINTMENT TOPICAL DAILY PRN
Qty: 30 G | Refills: 1 | Status: SHIPPED | OUTPATIENT
Start: 2023-05-12

## 2023-05-12 RX ORDER — GABAPENTIN 100 MG/1
CAPSULE ORAL
Qty: 150 CAPSULE | Refills: 2 | Status: SHIPPED | OUTPATIENT
Start: 2023-05-12 | End: 2023-06-11

## 2023-05-12 NOTE — TELEPHONE ENCOUNTER
From: Nathaneil Free  To: Dr. Jones Lack: 5/12/2023 11:18 AM EDT  Subject: Meds    Please call into [facility] for Gabapentin 100MG Cap for refill & Fluocinonide Ointment 0.05%    Thank you very much, have a good Mother's Day!

## 2023-05-19 ENCOUNTER — OFFICE VISIT (OUTPATIENT)
Age: 88
End: 2023-05-19

## 2023-05-19 VITALS
BODY MASS INDEX: 24.46 KG/M2 | OXYGEN SATURATION: 96 % | TEMPERATURE: 98.1 F | SYSTOLIC BLOOD PRESSURE: 180 MMHG | DIASTOLIC BLOOD PRESSURE: 84 MMHG | HEART RATE: 78 BPM | WEIGHT: 147 LBS | RESPIRATION RATE: 17 BRPM

## 2023-05-19 DIAGNOSIS — N30.00 ACUTE CYSTITIS WITHOUT HEMATURIA: ICD-10-CM

## 2023-05-19 DIAGNOSIS — R30.9 URINARY PAIN: Primary | ICD-10-CM

## 2023-05-19 LAB
BILIRUBIN, URINE, POC: NEGATIVE
BLOOD URINE, POC: ABNORMAL
GLUCOSE URINE, POC: NEGATIVE
KETONES, URINE, POC: NEGATIVE
LEUKOCYTE ESTERASE, URINE, POC: ABNORMAL
NITRITE, URINE, POC: NEGATIVE
PH, URINE, POC: 7 (ref 4.6–8)
PROTEIN,URINE, POC: NEGATIVE
SPECIFIC GRAVITY, URINE, POC: 1.01 (ref 1–1.03)
URINALYSIS CLARITY, POC: ABNORMAL
URINALYSIS COLOR, POC: YELLOW
UROBILINOGEN, POC: ABNORMAL

## 2023-05-19 RX ORDER — OXYBUTYNIN CHLORIDE 10 MG/1
10 TABLET, EXTENDED RELEASE ORAL DAILY
COMMUNITY

## 2023-05-19 RX ORDER — LEVOTHYROXINE SODIUM 0.05 MG/1
50 TABLET ORAL DAILY
COMMUNITY

## 2023-05-19 RX ORDER — LISINOPRIL 10 MG/1
10 TABLET ORAL DAILY
COMMUNITY

## 2023-05-19 RX ORDER — NITROFURANTOIN 25; 75 MG/1; MG/1
100 CAPSULE ORAL 2 TIMES DAILY
Qty: 20 CAPSULE | Refills: 0 | Status: SHIPPED | OUTPATIENT
Start: 2023-05-19 | End: 2023-05-24

## 2023-05-19 ASSESSMENT — ENCOUNTER SYMPTOMS
ALLERGIC/IMMUNOLOGIC NEGATIVE: 1
GASTROINTESTINAL NEGATIVE: 1
RESPIRATORY NEGATIVE: 1
EYES NEGATIVE: 1

## 2023-05-19 NOTE — PROGRESS NOTES
Maple Phalen ( 5/2/1929) is a 80 y.o. female, New Patient patient, here for evaluation of the following chief complaint(s):  New Patient and Urinary Tract Infection (Pt c/o urinary pain started this morning.)     ASSESSMENT/PLAN:  Stacy Barrera was seen today for new patient and urinary tract infection. Diagnoses and all orders for this visit:    Urinary pain  -     AMB POC URINALYSIS DIP STICK MANUAL W/O MICRO  -     Urine culture (clean catch)    Acute cystitis without hematuria  -     Urine culture (clean catch)    Other orders  -     nitrofurantoin, macrocrystal-monohydrate, (MACROBID) 100 MG capsule; Take 1 capsule by mouth 2 times daily for 5 days       Nurse with patient today, she will monitor her blood pressure at assisted living home. No follow-ups on file. History provided by:  Patient   used: No    Urinary Tract Infection  This is a new problem. The current episode started in the past 7 days. The problem has been gradually worsening since onset. Associated symptoms include hematuria. Review of Systems   Constitutional: Negative. HENT: Negative. Eyes: Negative. Respiratory: Negative. Cardiovascular: Negative. Gastrointestinal: Negative. Endocrine: Negative. Genitourinary:  Positive for dysuria, frequency and hematuria. Allergic/Immunologic: Negative. Neurological: Negative.         Subjective:   Pt is a 80 y.o. female     Past Medical History:   Diagnosis Date    Acquired hypothyroidism 11/28/2017    Allergic rhinoconjunctivitis, seasonal and perennial 9/5/2019    Arthritis     Balance problem 11/28/2017    Excessive drinking alcohol 11/29/2017    History of hormone replacement therapy 1987    stopped    HTN, goal below 150/90 11/28/2017    Iron deficiency anemia 62/28/2686    Lichen planus     Oral    Neuropathic postherpetic trigeminal neuralgia 11/28/2017    OAB (overactive bladder) 11/29/2017    Osteopenia of multiple sites 2/2/2018    dexa

## 2023-05-20 RX ORDER — OXYBUTYNIN CHLORIDE 5 MG/1
TABLET ORAL DAILY PRN
COMMUNITY
Start: 2018-07-19

## 2023-05-20 RX ORDER — LISINOPRIL 10 MG/1
1 TABLET ORAL DAILY
COMMUNITY
Start: 2023-02-23 | End: 2023-06-20

## 2023-05-20 RX ORDER — DOXYCYCLINE HYCLATE 100 MG/1
CAPSULE ORAL 2 TIMES DAILY
COMMUNITY

## 2023-05-20 RX ORDER — ALENDRONATE SODIUM 70 MG/1
TABLET ORAL
COMMUNITY
Start: 2022-06-24 | End: 2023-06-20

## 2023-05-20 RX ORDER — METRONIDAZOLE 10 MG/G
GEL TOPICAL DAILY
COMMUNITY
Start: 2022-06-17

## 2023-05-20 RX ORDER — LEVOTHYROXINE SODIUM 0.05 MG/1
1 TABLET ORAL DAILY
COMMUNITY
Start: 2022-06-24

## 2023-05-20 RX ORDER — LORATADINE 10 MG/1
10 TABLET ORAL
COMMUNITY

## 2023-05-20 RX ORDER — BIOTIN 10000 MCG
CAPSULE ORAL
COMMUNITY

## 2023-05-21 LAB
BACTERIA SPEC CULT: ABNORMAL
CC UR VC: ABNORMAL
SERVICE CMNT-IMP: ABNORMAL

## 2023-05-22 LAB
BACTERIA SPEC CULT: ABNORMAL
CC UR VC: ABNORMAL
SERVICE CMNT-IMP: ABNORMAL

## 2023-05-26 ENCOUNTER — TELEPHONE (OUTPATIENT)
Age: 88
End: 2023-05-26

## 2023-06-20 ENCOUNTER — PATIENT MESSAGE (OUTPATIENT)
Age: 88
End: 2023-06-20

## 2023-06-20 RX ORDER — LISINOPRIL 10 MG/1
10 TABLET ORAL DAILY
Qty: 90 TABLET | Refills: 3 | Status: SHIPPED | OUTPATIENT
Start: 2023-06-20

## 2023-06-26 ENCOUNTER — OFFICE VISIT (OUTPATIENT)
Age: 88
End: 2023-06-26
Payer: MEDICARE

## 2023-06-26 VITALS
WEIGHT: 146.5 LBS | TEMPERATURE: 97.5 F | HEART RATE: 87 BPM | RESPIRATION RATE: 16 BRPM | OXYGEN SATURATION: 98 % | DIASTOLIC BLOOD PRESSURE: 80 MMHG | HEIGHT: 65 IN | SYSTOLIC BLOOD PRESSURE: 158 MMHG | BODY MASS INDEX: 24.41 KG/M2

## 2023-06-26 DIAGNOSIS — E03.9 ACQUIRED HYPOTHYROIDISM: ICD-10-CM

## 2023-06-26 DIAGNOSIS — B02.22 POSTHERPETIC TRIGEMINAL NEURALGIA: ICD-10-CM

## 2023-06-26 DIAGNOSIS — M85.80 OSTEOPENIA, UNSPECIFIED LOCATION: ICD-10-CM

## 2023-06-26 DIAGNOSIS — M54.31 RIGHT SIDED SCIATICA: ICD-10-CM

## 2023-06-26 DIAGNOSIS — I10 HTN, GOAL BELOW 150/90: ICD-10-CM

## 2023-06-26 DIAGNOSIS — E78.5 HYPERLIPIDEMIA, UNSPECIFIED HYPERLIPIDEMIA TYPE: ICD-10-CM

## 2023-06-26 DIAGNOSIS — Z00.00 ENCOUNTER FOR MEDICARE ANNUAL WELLNESS EXAM: Primary | ICD-10-CM

## 2023-06-26 PROCEDURE — 1123F ACP DISCUSS/DSCN MKR DOCD: CPT | Performed by: FAMILY MEDICINE

## 2023-06-26 PROCEDURE — 99214 OFFICE O/P EST MOD 30 MIN: CPT | Performed by: FAMILY MEDICINE

## 2023-06-26 PROCEDURE — G0439 PPPS, SUBSEQ VISIT: HCPCS | Performed by: FAMILY MEDICINE

## 2023-06-26 SDOH — ECONOMIC STABILITY: FOOD INSECURITY: WITHIN THE PAST 12 MONTHS, YOU WORRIED THAT YOUR FOOD WOULD RUN OUT BEFORE YOU GOT MONEY TO BUY MORE.: NEVER TRUE

## 2023-06-26 SDOH — ECONOMIC STABILITY: FOOD INSECURITY: WITHIN THE PAST 12 MONTHS, THE FOOD YOU BOUGHT JUST DIDN'T LAST AND YOU DIDN'T HAVE MONEY TO GET MORE.: NEVER TRUE

## 2023-06-26 SDOH — ECONOMIC STABILITY: INCOME INSECURITY: HOW HARD IS IT FOR YOU TO PAY FOR THE VERY BASICS LIKE FOOD, HOUSING, MEDICAL CARE, AND HEATING?: NOT HARD AT ALL

## 2023-06-26 SDOH — ECONOMIC STABILITY: HOUSING INSECURITY
IN THE LAST 12 MONTHS, WAS THERE A TIME WHEN YOU DID NOT HAVE A STEADY PLACE TO SLEEP OR SLEPT IN A SHELTER (INCLUDING NOW)?: NO

## 2023-06-26 ASSESSMENT — ENCOUNTER SYMPTOMS
ABDOMINAL PAIN: 0
VOMITING: 0
SHORTNESS OF BREATH: 0
WHEEZING: 0
COUGH: 0
CHEST TIGHTNESS: 0
BACK PAIN: 1
NAUSEA: 0
CONSTIPATION: 0
DIARRHEA: 0

## 2023-06-26 ASSESSMENT — PATIENT HEALTH QUESTIONNAIRE - PHQ9
SUM OF ALL RESPONSES TO PHQ QUESTIONS 1-9: 0
SUM OF ALL RESPONSES TO PHQ QUESTIONS 1-9: 0
SUM OF ALL RESPONSES TO PHQ9 QUESTIONS 1 & 2: 0
SUM OF ALL RESPONSES TO PHQ QUESTIONS 1-9: 0
1. LITTLE INTEREST OR PLEASURE IN DOING THINGS: 0
2. FEELING DOWN, DEPRESSED OR HOPELESS: 0
SUM OF ALL RESPONSES TO PHQ QUESTIONS 1-9: 0

## 2023-06-26 ASSESSMENT — LIFESTYLE VARIABLES
HOW MANY STANDARD DRINKS CONTAINING ALCOHOL DO YOU HAVE ON A TYPICAL DAY: 1 OR 2
HOW OFTEN DO YOU HAVE A DRINK CONTAINING ALCOHOL: MONTHLY OR LESS

## 2023-06-27 LAB
25(OH)D3 SERPL-MCNC: 80.6 NG/ML (ref 30–100)
ALBUMIN SERPL-MCNC: 4 G/DL (ref 3.5–5)
ALBUMIN/GLOB SERPL: 1.3 (ref 1.1–2.2)
ALP SERPL-CCNC: 76 U/L (ref 45–117)
ALT SERPL-CCNC: 16 U/L (ref 12–78)
ANION GAP SERPL CALC-SCNC: 7 MMOL/L (ref 5–15)
AST SERPL-CCNC: 18 U/L (ref 15–37)
BILIRUB SERPL-MCNC: 0.8 MG/DL (ref 0.2–1)
BUN SERPL-MCNC: 17 MG/DL (ref 6–20)
BUN/CREAT SERPL: 17 (ref 12–20)
CALCIUM SERPL-MCNC: 10 MG/DL (ref 8.5–10.1)
CHLORIDE SERPL-SCNC: 103 MMOL/L (ref 97–108)
CHOLEST SERPL-MCNC: 302 MG/DL
CO2 SERPL-SCNC: 27 MMOL/L (ref 21–32)
CREAT SERPL-MCNC: 0.99 MG/DL (ref 0.55–1.02)
ERYTHROCYTE [DISTWIDTH] IN BLOOD BY AUTOMATED COUNT: 13.6 % (ref 11.5–14.5)
GLOBULIN SER CALC-MCNC: 3.1 G/DL (ref 2–4)
GLUCOSE SERPL-MCNC: 97 MG/DL (ref 65–100)
HCT VFR BLD AUTO: 48.6 % (ref 35–47)
HDLC SERPL-MCNC: 82 MG/DL
HDLC SERPL: 3.7 (ref 0–5)
HGB BLD-MCNC: 15.9 G/DL (ref 11.5–16)
LDLC SERPL CALC-MCNC: 197.2 MG/DL (ref 0–100)
MCH RBC QN AUTO: 31.4 PG (ref 26–34)
MCHC RBC AUTO-ENTMCNC: 32.7 G/DL (ref 30–36.5)
MCV RBC AUTO: 96 FL (ref 80–99)
NRBC # BLD: 0 K/UL (ref 0–0.01)
NRBC BLD-RTO: 0 PER 100 WBC
PLATELET # BLD AUTO: 273 K/UL (ref 150–400)
PMV BLD AUTO: 10.5 FL (ref 8.9–12.9)
POTASSIUM SERPL-SCNC: 4.6 MMOL/L (ref 3.5–5.1)
PROT SERPL-MCNC: 7.1 G/DL (ref 6.4–8.2)
RBC # BLD AUTO: 5.06 M/UL (ref 3.8–5.2)
SODIUM SERPL-SCNC: 137 MMOL/L (ref 136–145)
TRIGL SERPL-MCNC: 114 MG/DL
TSH SERPL DL<=0.05 MIU/L-ACNC: 1.65 UIU/ML (ref 0.36–3.74)
VLDLC SERPL CALC-MCNC: 22.8 MG/DL
WBC # BLD AUTO: 7.3 K/UL (ref 3.6–11)

## 2023-06-28 ENCOUNTER — PATIENT MESSAGE (OUTPATIENT)
Age: 88
End: 2023-06-28

## 2023-06-28 DIAGNOSIS — M48.56XS NONTRAUMATIC COMPRESSION FRACTURE OF L1 VERTEBRA, SEQUELA: Primary | ICD-10-CM

## 2023-06-28 DIAGNOSIS — R26.81 GAIT INSTABILITY: ICD-10-CM

## 2023-06-28 DIAGNOSIS — M54.31 RIGHT SIDED SCIATICA: ICD-10-CM

## 2023-06-28 DIAGNOSIS — B02.22 POSTHERPETIC TRIGEMINAL NEURALGIA: ICD-10-CM

## 2023-07-06 ENCOUNTER — APPOINTMENT (OUTPATIENT)
Facility: HOSPITAL | Age: 88
End: 2023-07-06
Payer: MEDICARE

## 2023-07-06 ENCOUNTER — HOSPITAL ENCOUNTER (EMERGENCY)
Facility: HOSPITAL | Age: 88
Discharge: HOME | End: 2023-07-06
Attending: EMERGENCY MEDICINE
Payer: MEDICARE

## 2023-07-06 VITALS
DIASTOLIC BLOOD PRESSURE: 97 MMHG | TEMPERATURE: 97.7 F | OXYGEN SATURATION: 95 % | RESPIRATION RATE: 16 BRPM | SYSTOLIC BLOOD PRESSURE: 162 MMHG | WEIGHT: 150.79 LBS | BODY MASS INDEX: 25.09 KG/M2 | HEART RATE: 90 BPM

## 2023-07-06 DIAGNOSIS — S39.012A BACK STRAIN, INITIAL ENCOUNTER: Primary | ICD-10-CM

## 2023-07-06 PROCEDURE — 74176 CT ABD & PELVIS W/O CONTRAST: CPT

## 2023-07-06 PROCEDURE — 99284 EMERGENCY DEPT VISIT MOD MDM: CPT

## 2023-07-06 RX ORDER — CYCLOBENZAPRINE HCL 10 MG
10 TABLET ORAL 3 TIMES DAILY PRN
Qty: 21 TABLET | Refills: 0 | Status: SHIPPED | OUTPATIENT
Start: 2023-07-06 | End: 2023-07-16

## 2023-07-06 ASSESSMENT — PAIN - FUNCTIONAL ASSESSMENT: PAIN_FUNCTIONAL_ASSESSMENT: 0-10

## 2023-07-06 ASSESSMENT — PAIN SCALES - GENERAL: PAINLEVEL_OUTOF10: 2

## 2023-07-06 ASSESSMENT — ENCOUNTER SYMPTOMS
EYES NEGATIVE: 1
RESPIRATORY NEGATIVE: 1
BACK PAIN: 1
GASTROINTESTINAL NEGATIVE: 1

## 2023-07-06 NOTE — DISCHARGE INSTRUCTIONS
You were seen in the emergency department for back pain. The results of your tests were reassuring. Although an exact cause of your symptoms was not identified, the most likely cause is a muscle strain. Please take any medications prescribed at this visit as instructed. Please follow-up with your PCP or return to the emergency department if you experience a worsening of symptoms or any new symptoms that are concerning to you.

## 2023-07-06 NOTE — ED TRIAGE NOTES
Pt arrives POV c/o Lower back pain and thoracic pain following hearing a \"pop\" in her back. PMS intact, no loss of bowel or bladder.

## 2023-07-06 NOTE — ED NOTES
Pt discharged in stable condition at this time. MD/ETHAN reviewed discharge instructions, prescriptions, and follow up with patient at bedside. Pt verbalized understanding and denies any needs or questions at this time. Pt NAD on DC assisted via WC per her baseline to be driven home by her daughter.         Michael Lopez RN  07/06/23 [id number]

## 2023-07-06 NOTE — ED PROVIDER NOTES
[facility] [place] EM[facility]GENCY [facility]  EMERGENCY DEPARTMENT ENCOUNTER      Pt Name: Jf Wasserman  MRN: [id number]  [address] [facility] [place] 5/2/1929  Date of evaluation: 7/6/2023  Provider: Rodolfo Marks MD    [address]       Chief Complaint   Patient presents with    Back Pain         HISTORY OF PRESENT ILLNESS   (Location/Symptom, Timing/Onset, Context/Setting, Quality, Duration, Modifying Factors, Severity)  Note limiting factors. 80-year-old female with PMHx of hypertension, spinal stenosis presents to the emergency department complaining of chronic back pain but with a complaint of \"hearing a pop\" in her back while she was adjusting her position in a car seat earlier today. Patient denies new focal back pain, just stating \"it just hurts from the waist up. \"  She denies any leg weakness, numbness, bowel or bladder incontinence. She has no acute complaints besides \"hearing a pop. \"    The history is provided by the patient. Review of External Medical Records:     Nursing Notes were reviewed. REVIEW OF SYSTEMS    (2-9 systems for level 4, 10 or more for level 5)     Review of Systems   Constitutional: Negative. HENT: Negative. Eyes: Negative. Respiratory: Negative. Cardiovascular: Negative. Gastrointestinal: Negative. Genitourinary: Negative. Musculoskeletal:  Positive for back pain. Skin: Negative. Neurological: Negative. Psychiatric/Behavioral: Negative. Except as noted above the remainder of the review of systems was reviewed and negative.        PAST MEDICAL HISTORY     Past Medical History:   Diagnosis Date    Acquired hypothyroidism 11/28/2017    Allergic rhinoconjunctivitis, seasonal and perennial 9/5/2019    Arthritis     Balance problem 11/28/2017    Excessive drinking alcohol 11/29/2017    History of hormone replacement therapy 1987    stopped    HTN, goal below 150/90 11/28/2017    Iron deficiency anemia 62/62/4700    Lichen planus     Oral    Neuropathic postherpetic trigeminal

## 2023-07-10 ENCOUNTER — OFFICE VISIT (OUTPATIENT)
Age: 88
End: 2023-07-10
Payer: MEDICARE

## 2023-07-10 ENCOUNTER — NURSE TRIAGE (OUTPATIENT)
Dept: OTHER | Facility: CLINIC | Age: 88
End: 2023-07-10

## 2023-07-10 VITALS
OXYGEN SATURATION: 100 % | BODY MASS INDEX: 24.43 KG/M2 | SYSTOLIC BLOOD PRESSURE: 152 MMHG | DIASTOLIC BLOOD PRESSURE: 79 MMHG | WEIGHT: 146.6 LBS | TEMPERATURE: 97.9 F | RESPIRATION RATE: 16 BRPM | HEIGHT: 65 IN | HEART RATE: 88 BPM

## 2023-07-10 DIAGNOSIS — G47.00 INSOMNIA, UNSPECIFIED TYPE: ICD-10-CM

## 2023-07-10 DIAGNOSIS — M54.41 ACUTE RIGHT-SIDED LOW BACK PAIN WITH RIGHT-SIDED SCIATICA: Primary | ICD-10-CM

## 2023-07-10 PROCEDURE — G8427 DOCREV CUR MEDS BY ELIG CLIN: HCPCS | Performed by: STUDENT IN AN ORGANIZED HEALTH CARE EDUCATION/TRAINING PROGRAM

## 2023-07-10 PROCEDURE — G8420 CALC BMI NORM PARAMETERS: HCPCS | Performed by: STUDENT IN AN ORGANIZED HEALTH CARE EDUCATION/TRAINING PROGRAM

## 2023-07-10 PROCEDURE — 1090F PRES/ABSN URINE INCON ASSESS: CPT | Performed by: STUDENT IN AN ORGANIZED HEALTH CARE EDUCATION/TRAINING PROGRAM

## 2023-07-10 PROCEDURE — 1036F TOBACCO NON-USER: CPT | Performed by: STUDENT IN AN ORGANIZED HEALTH CARE EDUCATION/TRAINING PROGRAM

## 2023-07-10 PROCEDURE — 99214 OFFICE O/P EST MOD 30 MIN: CPT | Performed by: STUDENT IN AN ORGANIZED HEALTH CARE EDUCATION/TRAINING PROGRAM

## 2023-07-10 PROCEDURE — 1123F ACP DISCUSS/DSCN MKR DOCD: CPT | Performed by: STUDENT IN AN ORGANIZED HEALTH CARE EDUCATION/TRAINING PROGRAM

## 2023-07-10 RX ORDER — GABAPENTIN 100 MG/1
100 CAPSULE ORAL NIGHTLY PRN
Qty: 30 CAPSULE | Refills: 0 | Status: SHIPPED | OUTPATIENT
Start: 2023-07-10 | End: 2023-08-09

## 2023-07-10 RX ORDER — TIZANIDINE 2 MG/1
2 TABLET ORAL 3 TIMES DAILY PRN
Qty: 30 TABLET | Refills: 0 | Status: SHIPPED | OUTPATIENT
Start: 2023-07-10

## 2023-07-10 RX ORDER — GABAPENTIN 100 MG/1
200 CAPSULE ORAL NIGHTLY PRN
Qty: 60 CAPSULE | Refills: 0 | Status: CANCELLED | OUTPATIENT
Start: 2023-07-10 | End: 2023-08-09

## 2023-07-10 RX ORDER — TRAZODONE HYDROCHLORIDE 50 MG/1
50 TABLET ORAL NIGHTLY PRN
Qty: 30 TABLET | Refills: 0 | Status: SHIPPED | OUTPATIENT
Start: 2023-07-10

## 2023-07-10 NOTE — TELEPHONE ENCOUNTER
[facility]    Received call from [facility] with [employer]. Subjective: Caller states \"ER follow up-New or worsening symptoms\"     Current Symptoms:   *Limited triage due to patient not being present during triage. Lower back pain around the hips  Patient was recently in the ED on 07/06/2023 for back strain. She injured her back in the car while pulling on her pants when she heard something \"pop\". \"Twisting of any motion of the back causes pain. \"  Having difficulty sleeping at night due to pain. Pain is worse at night. Patient able to use a walker in the day to ambulate    Onset: 1 week ago; worsening    Pain Severity: \"Severe last night, but says she is better and in discomfort and probably at a 4 or 5 this morning. \"    Temperature: Denies    What has been tried: Advil, Tylenol, Muscle relaxer    Recommended disposition: See PCP within 3 Days    Care advice provided, patient verbalizes understanding; denies any other questions or concerns; instructed to call back for any new or worsening symptoms. Patient/Caller agrees with recommended disposition; writer provided warm transfer to [facility] for appointment scheduling    Attention Provider: Thank you for allowing me to participate in the care of your patient. The patient was connected to triage in response to information provided to the ECC/PSC. Please do not respond through this encounter as the response is not directed to a shared pool.     Reason for Disposition   MODERATE back pain (e.g., interferes with normal activities) and present > 3 days    Protocols used: Back Pain-ADULT-[place]

## 2023-07-10 NOTE — PROGRESS NOTES
[facility] PRACTICE      Chief Complaint:     Chief Complaint   Patient presents with    Back Pain       Abigail Valderrama is a 80 y.o. female that presents for: back pain      Assessment/Plan:     Paola Small was seen today for back pain. Diagnoses and all orders for this visit:    Acute right-sided low back pain with right-sided sciatica  -     External Referral To Physical Therapy  -     tiZANidine (ZANAFLEX) 2 MG tablet; Take 1 tablet by mouth 3 times daily as needed (back pain)  -     MRI LUMBAR SPINE W CONTRAST; Future  -     gabapentin (NEURONTIN) 100 MG capsule; Take 1 capsule by mouth nightly as needed (pain) for up to 30 days. Intended supply: 90 days Max Daily Amount: 100 mg    Insomnia, unspecified type  -     traZODone (DESYREL) 50 MG tablet; Take 1 tablet by mouth nightly as needed for Sleep  -     gabapentin (NEURONTIN) 100 MG capsule; Take 1 capsule by mouth nightly as needed (pain) for up to 30 days. Intended supply: 90 days Max Daily Amount: 100 mg       Pain not controlled on current regimen, taking gabapentin 400 mg daily. Patient also requesting additional medicine to help with sleep. Discussed risks and benefits of all of the medicines. Tylenol and Ibuprofen alternating every 4 hours  Extra 1-2 pills of gabapentin at bedtime  Trazodone at bed time IF NEEDED for sleep  Zanaflex three times a day as needed (muscle relaxer, can make you sleepy)    Advised her to call her pain management doctor    Follow up:     No follow-ups on file. Subjective:   HPI:  Abigail Valderrama is a 80 y.o. female past medical history of hypertension and spinal stenosis that presents for:    Social History     Social History Narrative    Lives in cottage, [facility], [facility]  Daughter Bernice Larios lives nearby     Seen in ER on 7/6/2023 (4 days ago) for low back pain. She stated she heard a pop in her back that was painful after adjusting her position in her car seat.   CT was negative for

## 2023-07-10 NOTE — PATIENT INSTRUCTIONS
Tylenol and Ibuprofen alternating every 4 hours  Extra 1-2 pills of gabapentin at bedtime  Trazodone at bed time IF NEEDED for sleep  Zanaflex three times a day as needed (muscle relaxer, can make you sleepy)    Call you pain management doctor

## 2023-07-12 ENCOUNTER — HOSPITAL ENCOUNTER (OUTPATIENT)
Facility: HOSPITAL | Age: 88
Discharge: HOME | End: 2023-07-15
Attending: STUDENT IN AN ORGANIZED HEALTH CARE EDUCATION/TRAINING PROGRAM
Payer: MEDICARE

## 2023-07-12 DIAGNOSIS — M54.41 ACUTE RIGHT-SIDED LOW BACK PAIN WITH RIGHT-SIDED SCIATICA: ICD-10-CM

## 2023-07-12 PROCEDURE — 72158 MRI LUMBAR SPINE W/O & W/DYE: CPT

## 2023-07-12 PROCEDURE — A9579 GAD-BASE MR CONTRAST NOS,1ML: HCPCS | Performed by: RADIOLOGY

## 2023-07-12 PROCEDURE — 6360000004 HC RX CONTRAST MEDICATION: Performed by: RADIOLOGY

## 2023-07-12 RX ADMIN — GADOTERIDOL 13 ML: 279.3 INJECTION, SOLUTION INTRAVENOUS at 10:43

## 2023-07-13 ENCOUNTER — TELEPHONE (OUTPATIENT)
Age: 88
End: 2023-07-13

## 2023-07-14 DIAGNOSIS — M80.08XG AGE-RELATED OSTEOPOROSIS WITH CURRENT PATHOLOGICAL FRACTURE, VERTEBRA(E), SUBSEQUENT ENCOUNTER FOR FRACTURE WITH DELAYED HEALING: ICD-10-CM

## 2023-07-14 DIAGNOSIS — S32.010A CLOSED COMPRESSION FRACTURE OF BODY OF L1 VERTEBRA (HCC): Primary | ICD-10-CM

## 2023-07-14 DIAGNOSIS — M54.41 ACUTE RIGHT-SIDED LOW BACK PAIN WITH RIGHT-SIDED SCIATICA: ICD-10-CM

## 2023-07-19 ENCOUNTER — HOSPITAL ENCOUNTER (OUTPATIENT)
Facility: HOSPITAL | Age: 88
Discharge: HOME | End: 2023-07-19
Attending: STUDENT IN AN ORGANIZED HEALTH CARE EDUCATION/TRAINING PROGRAM | Admitting: STUDENT IN AN ORGANIZED HEALTH CARE EDUCATION/TRAINING PROGRAM
Payer: MEDICARE

## 2023-07-19 VITALS
TEMPERATURE: 97.3 F | HEART RATE: 61 BPM | WEIGHT: 144.5 LBS | RESPIRATION RATE: 14 BRPM | BODY MASS INDEX: 24.07 KG/M2 | SYSTOLIC BLOOD PRESSURE: 138 MMHG | HEIGHT: 65 IN | DIASTOLIC BLOOD PRESSURE: 56 MMHG | OXYGEN SATURATION: 93 %

## 2023-07-19 DIAGNOSIS — M54.41 ACUTE RIGHT-SIDED LOW BACK PAIN WITH RIGHT-SIDED SCIATICA: ICD-10-CM

## 2023-07-19 DIAGNOSIS — M80.08XG AGE-RELATED OSTEOPOROSIS WITH CURRENT PATHOLOGICAL FRACTURE, VERTEBRA(E), SUBSEQUENT ENCOUNTER FOR FRACTURE WITH DELAYED HEALING: ICD-10-CM

## 2023-07-19 DIAGNOSIS — S32.010A CLOSED COMPRESSION FRACTURE OF BODY OF L1 VERTEBRA (HCC): ICD-10-CM

## 2023-07-19 LAB — ECHO BSA: 1.73 M2

## 2023-07-19 PROCEDURE — C1713 ANCHOR/SCREW BN/BN,TIS/BN: HCPCS | Performed by: STUDENT IN AN ORGANIZED HEALTH CARE EDUCATION/TRAINING PROGRAM

## 2023-07-19 PROCEDURE — C1894 INTRO/SHEATH, NON-LASER: HCPCS | Performed by: STUDENT IN AN ORGANIZED HEALTH CARE EDUCATION/TRAINING PROGRAM

## 2023-07-19 PROCEDURE — 77002 NEEDLE LOCALIZATION BY XRAY: CPT

## 2023-07-19 PROCEDURE — 2500000003 HC RX 250 WO HCPCS: Performed by: STUDENT IN AN ORGANIZED HEALTH CARE EDUCATION/TRAINING PROGRAM

## 2023-07-19 PROCEDURE — 22514 PERQ VERTEBRAL AUGMENTATION: CPT

## 2023-07-19 PROCEDURE — 99152 MOD SED SAME PHYS/QHP 5/>YRS: CPT | Performed by: STUDENT IN AN ORGANIZED HEALTH CARE EDUCATION/TRAINING PROGRAM

## 2023-07-19 PROCEDURE — 6360000002 HC RX W HCPCS: Performed by: STUDENT IN AN ORGANIZED HEALTH CARE EDUCATION/TRAINING PROGRAM

## 2023-07-19 PROCEDURE — 2720000010 HC SURG SUPPLY STERILE: Performed by: STUDENT IN AN ORGANIZED HEALTH CARE EDUCATION/TRAINING PROGRAM

## 2023-07-19 PROCEDURE — 2580000003 HC RX 258: Performed by: STUDENT IN AN ORGANIZED HEALTH CARE EDUCATION/TRAINING PROGRAM

## 2023-07-19 PROCEDURE — 2709999900 HC NON-CHARGEABLE SUPPLY: Performed by: STUDENT IN AN ORGANIZED HEALTH CARE EDUCATION/TRAINING PROGRAM

## 2023-07-19 PROCEDURE — 99153 MOD SED SAME PHYS/QHP EA: CPT | Performed by: STUDENT IN AN ORGANIZED HEALTH CARE EDUCATION/TRAINING PROGRAM

## 2023-07-19 RX ORDER — FENTANYL CITRATE 50 UG/ML
INJECTION, SOLUTION INTRAMUSCULAR; INTRAVENOUS PRN
Status: COMPLETED | OUTPATIENT
Start: 2023-07-19 | End: 2023-07-19

## 2023-07-19 RX ORDER — MIDAZOLAM HYDROCHLORIDE 2 MG/2ML
INJECTION, SOLUTION INTRAMUSCULAR; INTRAVENOUS PRN
Status: COMPLETED | OUTPATIENT
Start: 2023-07-19 | End: 2023-07-19

## 2023-07-19 RX ORDER — LIDOCAINE HYDROCHLORIDE 10 MG/ML
INJECTION, SOLUTION EPIDURAL; INFILTRATION; INTRACAUDAL; PERINEURAL PRN
Status: COMPLETED | OUTPATIENT
Start: 2023-07-19 | End: 2023-07-19

## 2023-07-19 RX ADMIN — LIDOCAINE HYDROCHLORIDE 14 ML: 10 INJECTION, SOLUTION EPIDURAL; INFILTRATION; INTRACAUDAL; PERINEURAL at 13:14

## 2023-07-19 RX ADMIN — Medication 10 ML: at 13:15

## 2023-07-19 RX ADMIN — MIDAZOLAM HYDROCHLORIDE 1 MG: 1 INJECTION, SOLUTION INTRAMUSCULAR; INTRAVENOUS at 13:06

## 2023-07-19 RX ADMIN — FENTANYL CITRATE 50 MCG: 50 INJECTION, SOLUTION INTRAMUSCULAR; INTRAVENOUS at 13:06

## 2023-07-19 RX ADMIN — FENTANYL CITRATE 50 MCG: 50 INJECTION, SOLUTION INTRAMUSCULAR; INTRAVENOUS at 13:17

## 2023-07-19 RX ADMIN — CEFAZOLIN SODIUM 2000 MG: 1 POWDER, FOR SOLUTION INTRAMUSCULAR; INTRAVENOUS at 13:00

## 2023-07-19 RX ADMIN — MIDAZOLAM HYDROCHLORIDE 1 MG: 1 INJECTION, SOLUTION INTRAMUSCULAR; INTRAVENOUS at 13:24

## 2023-07-19 RX ADMIN — FENTANYL CITRATE 50 MCG: 50 INJECTION, SOLUTION INTRAMUSCULAR; INTRAVENOUS at 13:24

## 2023-07-19 RX ADMIN — MIDAZOLAM HYDROCHLORIDE 1 MG: 1 INJECTION, SOLUTION INTRAMUSCULAR; INTRAVENOUS at 13:09

## 2023-07-19 NOTE — H&P
Expenses: Not hard at all   Food Insecurity: No Food Insecurity    Worried About [place] in the Last Year: Never true    Ran Out of Food in the Last Year: Never true   Transportation Needs: Unknown    Lack of Transportation (Medical): Not on file    Lack of Transportation (Non-Medical): No   Physical Activity: Sufficiently Active    Days of Exercise per Week: 7 days    Minutes of Exercise per Session: 40 min   Stress: Not on file   Social Connections: Not on file   Intimate Partner Violence: Not on file   Housing Stability: Unknown    Unable to Pay for Housing in the Last Year: Not on file    Number of Places Lived in the Last Year: Not on file    Unstable Housing in the Last Year: No       Allergies: Allergies   Allergen Reactions    Diclofenac Sodium Anaphylaxis    Azithromycin     Bacitracin-Polymyxin B     Cedar Leaf Oil Other (See Comments)    Iron Other (See Comments)     GI UPSET AND DIZZINESS     Neosporin Original [Neomycin-Bacitracin Zn-Polymyx] Other (See Comments)    Pregabalin Other (See Comments)    Sulfa Antibiotics Other (See Comments)    Sulfur     Voltaren [Diclofenac]     Benzalkonium Chloride Rash       Current Medications:  No current facility-administered medications for this encounter. Physical Exam:  There were no vitals taken for this visit. GENERAL: alert, cooperative, no distress, appears stated age,   LUNG: Nonlabored respiration on room air  HEART: regular rate and rhythm    ASA 3  Mallampati 3    Alerts:      Laboratory:    No results for input(s): HGB, HCT, WBC, PLT, PTT, INR, BUN, CREA, K, CRCLT in the last 72 hours. Invalid input(s): PT      Plan of Care/Planned Procedure:  Risks, benefits, and alternatives reviewed with patient and she agrees to proceed with the procedure. L1 kyphoplasty    Deemed appropriate for moderate sedation with versed and fentanyl.     Sofy Hernandez MD  Interventional Radiology  [facility]  12:34 PM,

## 2023-07-19 NOTE — PROCEDURES
Interventional Radiology  Procedure Note        7/19/2023 1:54 PM    Patient: Gemma Watson     Informed consent obtained    Diagnosis: L1 compression fracture    Procedure(s): L1 kyphoplasty    Specimens removed:  none    Complications: None    Primary Physician: Mendel Lush, MD    Recommendations: N/A    Discharge Disposition: Stable; discharge to home    Full dictated report to follow    Mendel Lush, MD  Interventional Radiology  [facility]  1:54 PM, 7/19/2023

## 2023-07-19 NOTE — OR NURSING
MD ALERTED TO ELEVATED BP READINGS.  BP CUFF REPOSITIONED TO LEG AS ARMS ARE FLEXED IN POSITION FOR KYPHOPLASTY Subjective   58-year-old male with a history of CAD, A. fib/a flutter with previous of ablation complains of intermittent palpitations, fast and irregular for the past 2 weeks.  Patient has had associated productive cough with some dyspnea and congestion without fever.  Patient was seen by a nurse practitioner on Friday and empirically placed on a Z-Herbert.  Patient feels like he is doing worse.  No chest pain currently, chest pain is left-sided and dull, mild to moderate.  Patient used his son's albuterol inhaler and felt that that helped the wheezing.          Review of Systems   Constitutional: Negative.    HENT: Positive for congestion.    Respiratory: Positive for cough, shortness of breath and wheezing.    Cardiovascular: Positive for chest pain, palpitations and leg swelling.   Gastrointestinal: Negative.    Genitourinary: Negative.    Musculoskeletal: Negative.    Skin: Negative.    Neurological: Negative.    Psychiatric/Behavioral: Negative.    All other systems reviewed and are negative.      Past Medical History:   Diagnosis Date   • Abnormal cardiovascular stress test 01/19/2017   • Acute myocardial infarction ([facility]) 2019   • Arterial ischemic stroke, MCA (middle cerebral artery), left, acute ([facility]) 10/29/2019   • Atrial flutter with rapid ventricular response ([facility]) 02/04/2021   • Bicuspid aortic valve 02/16/2017   • Coronary artery disease involving native coronary artery of native heart    • Depression 12/30/2020   • History of coronary angioplasty with insertion of stent    • Hyperlipidemia    • Hypertension    • Hyperthyroidism    • Obesity 09/02/2011   • Paroxysmal SVT (supraventricular tachycardia) ([facility]) 01/08/2021   • RUE weakness 10/29/2019    Previous residual from Stroke, but was not work prohibiting.   • Seasonal allergic rhinitis 12/23/2020   • Sinus arrhythmia    • Sustained SVT ([facility])    • Type 2 diabetes mellitus ([facility])    • Vitamin D deficiency 11/05/2020       Allergies   Allergen Reactions   •  Morphine GI Intolerance   • Ozempic (0.25 Or 0.5 Mg-Dose) [Semaglutide(0.25 Or 0.5mg-Dos)] Diarrhea   • Isosorbide Headache   • Nitrofuran Derivatives Headache   • Nitroglycerin Headache       Past Surgical History:   Procedure Laterality Date   • APPENDECTOMY     • BACK SURGERY     • CARDIAC CATHETERIZATION  2010   • CARDIAC CATHETERIZATION  2019   • CARDIAC CATHETERIZATION N/A 12/31/2020    Procedure: Cardiac Catheterization/Vascular Study;  Surgeon: Moris Pedro MD;  Location:  [facility];  Service: Cardiovascular;  Laterality: N/A;   • CARDIAC CATHETERIZATION N/A 2/1/2022    Procedure: Left Heart Cath;  Surgeon: Kamlesh Richardson MD;  Location:  [facility] INVASIVE [facility];  Service: Cardiovascular;  Laterality: N/A;   • CARDIAC ELECTROPHYSIOLOGY PROCEDURE N/A 1/20/2021    Procedure: EP/Ablation;  Surgeon: Nathen Olmstead MD;  Location:  [facility] INVASIVE [facility];  Service: Cardiovascular;  Laterality: N/A;   • CARDIAC ELECTROPHYSIOLOGY PROCEDURE N/A 2/24/2022    Procedure: EP/Ablation-SVT [place] aware;  Surgeon: Candido Montana MD;  Location: [facility] CATH INVASIVE LOCATION;  Service: Cardiology;  Laterality: N/A;   • JOINT REPLACEMENT     • KNEE ARTHROSCOPY     • THYROIDECTOMY, PARTIAL  2015       Family History   Problem Relation Age of Onset   • Heart disease Mother    • Parkinsonism Father    • Cancer Father        Social History     Socioeconomic History   • Marital status:    Tobacco Use   • Smoking status: Never Smoker   • Smokeless tobacco: Never Used   Vaping Use   • Vaping Use: Never used   Substance and Sexual Activity   • Alcohol use: Not Currently   • Drug use: No   • Sexual activity: Defer           Objective   Physical Exam  Constitutional:       Appearance: Normal appearance.   HENT:      Head: Normocephalic and atraumatic.      Mouth/Throat:      Mouth: Mucous membranes are moist.      Pharynx: Oropharynx is clear.   Eyes:      Pupils:  Pupils are equal, round, and reactive to light.   Cardiovascular:      Rate and Rhythm: Tachycardia present.      Comments: Occasional extrasystole, seen as PVCs on the monitor.  Pulmonary:      Effort: Pulmonary effort is normal.      Comments: Bibasilar rales  Abdominal:      General: Bowel sounds are normal. There is no distension.      Palpations: Abdomen is soft.      Tenderness: There is no abdominal tenderness.   Musculoskeletal:      Comments: Trace pedal edema bilaterally, no calf tenderness   Skin:     General: Skin is warm and dry.      Capillary Refill: Capillary refill takes less than 2 seconds.   Neurological:      General: No focal deficit present.      Mental Status: He is alert and oriented to person, place, and time.   Psychiatric:         Mood and Affect: Mood normal.         Behavior: Behavior normal.         Procedures           ED Course  ED Course as of 07/18/22 2307 Mon Jul 18, 2022   0428 EKG interpretation: Sinus tachycardia, rate 102 with occasional PVC,  [JR]      ED Course User Index  [JR] Preet Erickson MD                                           [facility]  Number of Diagnoses or Management Options  Acute on chronic congestive heart failure, unspecified heart failure type (HCC)  Chest pain, unspecified type  Diagnosis management comments: Results for orders placed or performed during the hospital encounter of 07/18/22  -Respiratory Panel PCR w/COVID-19(SARS-CoV-2) [facility] In-House, NP Swab in UTM/VTM, 3-4 HR TAT - Swab, Nasopharynx:   Specimen: Nasopharynx; Swab       Result                      Value             Ref Range           ADENOVIRUS, PCR             Not Detected      Not Detected        Coronavirus 229E            Not Detected      Not Detected        Coronavirus HKU1            Not Detected      Not Detected        Coronavirus NL63            Not Detected      Not Detected        Coronavirus OC43            Not Detected      Not Detected         COVID19                     Not Detected      Not Detected*       Human Metapneumovirus       Not Detected      Not Detected        Human Rhinovirus/Enter*     Not Detected      Not Detected        Influenza A PCR             Not Detected      Not Detected        Influenza B PCR             Not Detected      Not Detected        Parainfluenza Virus 1       Not Detected      Not Detected        Parainfluenza Virus 2       Not Detected      Not Detected        Parainfluenza Virus 3       Not Detected      Not Detected        Parainfluenza Virus 4       Not Detected      Not Detected        RSV, PCR                    Not Detected      Not Detected        Bordetella pertussis p*     Not Detected      Not Detected        Bordetella parapertuss*     Not Detected      Not Detected        Chlamydophila pneumoni*     Not Detected      Not Detected        Mycoplasma pneumo by P*     Not Detected      Not Detected   -Procalcitonin:   Specimen: Blood       Result                      Value             Ref Range           Procalcitonin               0.07              0.00 - 0.25 *  -D-dimer, Quantitative:   Specimen: Blood       Result                      Value             Ref Range           D-Dimer, Quantitative       1.57 (H)          0.00 - 0.59 *  -Magnesium:   Specimen: Blood       Result                      Value             Ref Range           Magnesium                   2.2               1.6 - 2.6 mg*  -TSH:   Specimen: Blood       Result                      Value             Ref Range           TSH                         2.370             0.270 - 4.20*  -Comprehensive Metabolic Panel:   Specimen: Blood       Result                      Value             Ref Range           Glucose                     313 (H)           65 - 99 mg/dL       BUN                         17                6 - 20 mg/dL        Creatinine                  0.91              0.76 - 1.27 *       Sodium                      135 (L)            136 - 145 mm*       Potassium                   3.7               3.5 - 5.2 mm*       Chloride                    100               98 - 107 mmo*       CO2                         24.0              22.0 - 29.0 *       Calcium                     9.2               8.6 - 10.5 m*       Total Protein               6.8               6.0 - 8.5 g/*       Albumin                     3.70              3.50 - 5.20 *       ALT (SGPT)                  18                1 - 41 U/L          AST (SGOT)                  14                1 - 40 U/L          Alkaline Phosphatase        126 (H)           39 - 117 U/L        Total Bilirubin             0.3               0.0 - 1.2 mg*       Globulin                    3.1               gm/dL               A/G Ratio                   1.2               g/dL                BUN/Creatinine Ratio        18.7              7.0 - 25.0          Anion Gap                   11.0              5.0 - 15.0 m*       eGFR                        97.7              >60.0 mL/min*  -CBC Auto Differential:   Specimen: Blood       Result                      Value             Ref Range           WBC                         6.40              3.40 - 10.80*       RBC                         4.68              4.14 - 5.80 *       Hemoglobin                  13.0              13.0 - 17.7 *       Hematocrit                  38.9              37.5 - 51.0 %       MCV                         83.0              79.0 - 97.0 *       MCH                         27.8              26.6 - 33.0 *       MCHC                        33.5              31.5 - 35.7 *       RDW                         14.3              12.3 - 15.4 %       RDW-SD                      41.6              37.0 - 54.0 *       MPV                         9.7               6.0 - 12.0 fL       Platelets                   222               140 - 450 10*       Neutrophil %                60.4              42.7 - 76.0 %       Lymphocyte %                23.4               19.6 - 45.3 %       Monocyte %                  7.7               5.0 - 12.0 %        Eosinophil %                8.3 (H)           0.3 - 6.2 %         Basophil %                  0.2               0.0 - 1.5 %         Neutrophils, Absolute       3.90              1.70 - 7.00 *       Lymphocytes, Absolute       1.50              0.70 - 3.10 *       Monocytes, Absolute         0.50              0.10 - 0.90 *       Eosinophils, Absolute       0.50 (H)          0.00 - 0.40 *       Basophils, Absolute         0.00              0.00 - 0.20 *       nRBC                        0.1               0.0 - 0.2 /1*  -Troponin:   Specimen: Blood       Result                      Value             Ref Range           Troponin T                  0.025             0.000 - 0.03*  -BNP:   Specimen: Blood       Result                      Value             Ref Range           proBNP                      2,484.0 (H)       0.0 - 900.0 *  -Troponin:   Specimen: Blood       Result                      Value             Ref Range           Troponin T                  0.026             0.000 - 0.03*  -Lipid Panel:   Specimen: Blood       Result                      Value             Ref Range           Total Cholesterol           163               0 - 200 mg/dL       Triglycerides               164 (H)           0 - 150 mg/dL       HDL Cholesterol             24 (L)            40 - 60 mg/dL       LDL Cholesterol             110 (H)           0 - 100 mg/dL       VLDL Cholesterol            29                5 - 40 mg/dL        LDL/HDL Ratio               4.43                             -POC Glucose Once:   Specimen: Blood       Result                      Value             Ref Range           Glucose                     277 (H)           70 - 105 mg/*  -POC Glucose Once:   Specimen: Blood       Result                      Value             Ref Range           Glucose                     184 (H)           70 - 105 mg/*  -POC Glucose  Once:   Specimen: Blood       Result                      Value             Ref Range           Glucose                     251 (H)           70 - 105 mg/*  -POC Glucose Once:   Specimen: Blood       Result                      Value             Ref Range           Glucose                     229 (H)           70 - 105 mg/*  -ECG 12 Lead:        Result                      Value             Ref Range           QT Interval                 358               ms             -Adult Transthoracic Echo Complete W/ Cont if Necessary Per Protocol:        Result                      Value             Ref Range           Target HR (85%)             138               bpm                 Max. Pred. HR (100%)        162               bpm                 ACS                         2.6               cm                  Ao root diam                3.4               cm                  Ao pk luciana                   95.8              cm/sec              Ao V2 VTI                   16.3              cm                  TEDDY(I,D)                    2.00              cm2                 EDV(cubed)                  130.0             ml                  EDV(MOD-sp4)                103.7             ml                  EF(MOD-bp)                  34.0              %                   EF(MOD-sp4)                 34.3              %                   ESV(cubed)                  85.1              ml                  ESV(MOD-sp4)                68.1              ml                  IVS/LVPW                    1.00              cm                  LV mass(C)d                 178.9             grams               LV V1 max PG                0.73              mmHg                LV V1 mean PG               0.38              mmHg                LV V1 max                   42.6              cm/sec              LVPWd                       0.97              cm                  MR max PG                   111.5             mmHg                MV dec  slope                310.8             cm/sec2             MV dec time                 0.26              msec                MV V2 VTI                   19.7              cm                  MVA(VTI)                    1.66              cm2                 PA V2 max                   88.7              cm/sec              PI end-d luciana                129.4             cm/sec              RAP systole                 15.0              mmHg                RV V1 max PG                0.79              mmHg                RV V1 max                   43.9              cm/sec              RV V1 VTI                   7.6               cm                  RVIDd                       3.0               cm                  RVSP(TR)                    61.3              mmHg                SI(MOD-sp4)                 17.1              ml/m2               SV(LVOT)                    32.6              ml                  SV(MOD-sp4)                 35.6              ml                  TR max PG                   46.3              mmHg                Ao max PG                   3.7               mmHg                Ao mean PG                  2.32              mmHg                FS                          13.2              %                   IVSd                        0.97              cm                  LA dimension (2D)           4.8               cm                  LV V1 VTI                   8.2               cm                  LVIDd                       5.1               cm                  LVIDs                       4.4               cm                  LVOT area                   4.0               cm2                 LVOT diam                   2.26              cm                  MV E/A                      2.18                                  MV max PG                   2.7               mmHg                MV mean PG                  1.33              mmHg                MR max luciana                  526.8              cm/sec              MV A max luciana                36.9              cm/sec              MV E max luciana                80.6              cm/sec              TR max luciana                  339.2             cm/sec              LV [place] Vol (BSA corre*     49.7              cm2                 LV Sys Vol (BSA correc*     32.6              cm2            CT Angiogram Chest Pulmonary Embolism   Final Result        1.  Acute congestive heart failure given the presence of pleural effusions, pulmonary edema, and cardiomegaly.    2.  No pulmonary embolism.    3.  Redemonstration of nonspecific mediastinal lymph nodes.            Electronically signed by:  Erick Carey D.O.      7/18/2022 4:43 AM     XR Chest 1 View   Final Result    1. Limited by low lung volumes.    2. Bibasal atelectasis, edema, and/or airspace disease.    3. Cardiomegaly.        Electronically signed by:  Erick Carey D.O.      7/18/2022 4:38 AM       Patient with CHF exacerbation with large bilateral pleural effusion seen on CT, no current chest pain.  Will admit for diuresis and further evaluation and treatment.       Amount and/or Complexity of Data Reviewed  Clinical lab tests: reviewed and ordered  Tests in the radiology section of CPT®: ordered and reviewed  Tests in the medicine section of CPT®: ordered and reviewed  Decide to obtain previous medical records or to obtain history from someone other than the patient: yes  Discuss the patient with other providers: yes        Final diagnoses:   Acute on chronic congestive heart failure, unspecified heart failure type (HCC)   Chest pain, unspecified type       ED Disposition  ED Disposition     ED Disposition   Decision to Admit    Condition   --    Comment   Level of Care: Telemetry [5]   Diagnosis: Acute on chronic congestive heart failure, unspecified heart failure type (HCC) [[id number]]   Admitting Physician: RAJWINDER CARDONA [[id number]]   Attending Physician: RAJWINDER CARDONA [[id number]]               No  follow-up provider specified.       Medication List      No changes were made to your prescriptions during this visit.          Preet Erickson MD  07/18/22 [id number]

## 2023-07-19 NOTE — PROGRESS NOTES
TRANSFER - OUT REPORT:    [id number] RN IN [facility] UPDATED ON PTS STATUS IN LAB.  VERBAL BEDSIDE REPORT TO BE GIVEN IN [facility] TO RECEIVING RN

## 2023-07-19 NOTE — PROGRESS NOTES
12:20 PM  Patient arrived. ID and allergies verified verbally with patient. Pt voices understanding of procedure to be performed. Consent obtained. Pt prepped for procedure. 12:50 PM  TRANSFER - OUT REPORT:    Verbal report given to Taniya Mckeon on [facility]  being transferred to angio for ordered procedure       Report consisted of patient's Situation, Background, Assessment and   Recommendations(SBAR). Information from the following report(s) Nurse Handoff Report was reviewed with the receiving nurse. Lines:   Peripheral IV 07/19/23 Right; Anterior Forearm (Active)   Site Assessment Clean, dry & intact 07/19/23 1245   Line Status Flushed 07/19/23 1245        Opportunity for questions and clarification was provided. Patient transported with:  Tech    1:45 PM  TRANSFER - IN REPORT:    Verbal report received from Maura on [facility]  being received from [facility]io for routine post-op      Report consisted of patient's Situation, Background, Assessment and   Recommendations(SBAR). Information from the following report(s) Nurse Handoff Report was reviewed with the receiving nurse. Opportunity for questions and clarification was provided. Assessment completed upon patient's arrival to unit and care assumed. 3:30 PM  Pt states she can feel her legs and feer, no numbness or tingling noted. 3:40 PM  Discharge instructions and prescriptions reviewed with  patient and daughter. Opportunity provided for questions. patient verbalized understanding. Signed copy of discharge placed in the front of patient's chart. IV and tele removed. 3:50 PM  Pt sat on side of bed then ambulated around unit and to restroom. Voided. Returned to chair. Lower back site dressing dry and intact. No bleeding or hematoma. Pt voices no complaints. 4:05 PM  Pt discharged via wheelchair with daughter. Personal belongings with patient upon discharge.

## 2023-07-19 NOTE — DISCHARGE INSTRUCTIONS
Kyphoplasty: What to Expect at [address]  After kyphoplasty to relieve pain from compression fractures, your back may feel sore where the hollow needle (trocar) went into your back. This should go away in a few days. Most people are able to return to their daily activities within a day. This care sheet gives you a general idea about how long it will take for you to recover. But each person recovers at a different pace. Follow the steps below to get better as quickly as possible. How can you care for yourself at home? Activity    Take it easy for the first 24 hours. Rest when you feel tired. Getting enough sleep will help you recover. For the first day after the procedure, avoid lifting anything that would make you strain. This may include heavy grocery bags and milk containers, a heavy briefcase or backpack, cat litter or dog food bags, a vacuum , or a child. Diet    You can eat your normal diet. If your stomach is upset, try bland, low-fat foods like plain rice, broiled chicken, toast, and yogurt. Medicines    Your doctor will tell you if and when you can restart your medicines. You will also get instructions about taking any new medicines. If you stopped taking aspirin or some other blood thinner, your doctor will tell you when to start taking it again. Be safe with medicines. Take pain medicines exactly as directed. If the doctor gave you a prescription medicine for pain, take it as prescribed. If you are not taking a prescription pain medicine, ask your doctor if you can take an over-the-counter medicine. Do not take two or more pain medicines at the same time unless your doctor told you to. Many pain medicines have acetaminophen, which is Tylenol. Too much acetaminophen (Tylenol) can be harmful. Incision care    You will have a dressing over the cut (incision). A dressing helps the incision heal and protects it. Your doctor will tell you how to take care of this.

## 2023-07-21 DIAGNOSIS — B02.22 POSTHERPETIC TRIGEMINAL NEURALGIA: ICD-10-CM

## 2023-07-24 RX ORDER — GABAPENTIN 100 MG/1
CAPSULE ORAL
Qty: 150 CAPSULE | Refills: 2 | OUTPATIENT
Start: 2023-07-24 | End: 2023-09-04

## 2023-07-25 RX ORDER — GABAPENTIN 100 MG/1
CAPSULE ORAL
Qty: 150 CAPSULE | Refills: 2 | Status: SHIPPED | OUTPATIENT
Start: 2023-07-25 | End: 2023-09-08

## 2023-07-27 ENCOUNTER — HOME HEALTH ADMISSION (OUTPATIENT)
Dept: HOME HEALTH SERVICES | Facility: HOME HEALTH | Age: 88
End: 2023-07-27
Payer: MEDICARE

## 2023-07-31 ENCOUNTER — PATIENT MESSAGE (OUTPATIENT)
Age: 88
End: 2023-07-31

## 2023-07-31 ENCOUNTER — HOME CARE VISIT (OUTPATIENT)
Facility: HOME HEALTH | Age: 88
End: 2023-07-31

## 2023-07-31 VITALS
SYSTOLIC BLOOD PRESSURE: 160 MMHG | TEMPERATURE: 97.3 F | HEART RATE: 95 BPM | DIASTOLIC BLOOD PRESSURE: 70 MMHG | RESPIRATION RATE: 16 BRPM | OXYGEN SATURATION: 98 %

## 2023-07-31 DIAGNOSIS — B02.22 POSTHERPETIC TRIGEMINAL NEURALGIA: ICD-10-CM

## 2023-07-31 PROCEDURE — 0221000100 HH NO PAY CLAIM PROCEDURE

## 2023-07-31 PROCEDURE — G0151 HHCP-SERV OF PT,EA 15 MIN: HCPCS

## 2023-07-31 RX ORDER — GABAPENTIN 100 MG/1
CAPSULE ORAL
Qty: 56 CAPSULE | Refills: 0 | Status: SHIPPED | OUTPATIENT
Start: 2023-07-31 | End: 2023-09-14

## 2023-07-31 ASSESSMENT — ENCOUNTER SYMPTOMS: PAIN LOCATION - PAIN QUALITY: SHARP

## 2023-07-31 NOTE — HOME HEALTH
Skilled reason for admission/summary of clinical condition: Mrs. Maulik Edwards is a 81 yo female who is referred for [facility] PT/OT due to R sided sciatica. She had an episode prior to 7/6/23 where she was scooting back/readjusing in the seat of the car and pushed back with her legs and heard a loud \"pop\" from her back. She had acute pain the next day and went to ED for imaging. She was found to have an L1 compression fx and underwent kyphoplasty on 7/19/23. Since then her back pain is better but an old sciatic pain situation has arisen which has limited her very active lifestyle. [facility] and has aide care 3 days/week for 4 hours at a time. She usually exercises several days/week and goes out to shop/run errands with aide often. She only seldom uses her rollator in the home but always outdoors. Since this incident she is now limited to the home, is using rollator full time and all activity is limited by R hip radiating pain, balance is impaired and she is at an increased risk for falls. She has exquisite tenderness in the areas of R piriformis, R TFL and R glut med and responded well today to gentle self stretching instruction. Skilled PT is recommended 2w3 to address the above impairments so she can return to her active lifestyle. Subjective: my back feels better but my R hip feels worse  Caregiver: other. Caregiver assists with: see above Caregiver unable to assist with: Medications. Caregiver is available Regularly Caregiver is  present at this visit and did participate with clinician. Medications reconciled and all medications are available in the home this visit. The following education was provided regarding medications: medication interactions and look alike medications: take medications as prescribed, do not take NSAIDs unless approved by PCP. Patient/CG able to demonstrate knowledge through teach back with 100 percent accuracy. Medications are effective at this time.     MD notified

## 2023-08-01 ENCOUNTER — HOME CARE VISIT (OUTPATIENT)
Facility: HOME HEALTH | Age: 88
End: 2023-08-01

## 2023-08-01 VITALS
SYSTOLIC BLOOD PRESSURE: 144 MMHG | HEART RATE: 79 BPM | DIASTOLIC BLOOD PRESSURE: 80 MMHG | TEMPERATURE: 97.9 F | OXYGEN SATURATION: 98 %

## 2023-08-01 PROCEDURE — G0152 HHCP-SERV OF OT,EA 15 MIN: HCPCS

## 2023-08-01 NOTE — HOME HEALTH
Skilled reason for admission/summary of clinical condition:    Patient is a 79yo female referred to [facility] services following dx of L1 compression fax with kyphoplasty performed 7.19.23. Patient denies any recent falls but experienced back pain while moving in a car seat during a family trip. Patient continued having back, R sciatic pain with visit to ED on 7.6. 23. PMH significant for spinal stenosis, HTN, trigeminal neuropathy. Patient lives alone in single store condo with family living locally and paid caregiver assisting patient 3 days/week who provides assist with IADLs, transportation, shopping. Subjective:   \"I can do all that stuff and if needed Ricardo Bacca can help me. \"  Caregiver:   paid caregiver, Ricardo Erickson. Caregiver assists with: IADL and Transportation. Caregiver unable to assist with:   na.  Caregiver is available:  Regularly. Caregiver is not present at this visit. Medications unchanged from [facility] visit. Home health supplies by type and quantity ordered/delivered this visit include:   na  Patient instructed on plan of care and is agreeable to plan of care at this time with no further OT visits planned. Clinician reviewed orientation to home health booklet with patient/caregiver including OT contact information. Patient at risk for falls:   yes   Recommended requesting PT/OT orders due to fall risk:  PT/OT ordered   Patient response to recommended requesting of PT/OT orders:   na    Discharge planning discussed with patient and caregiver. Discharge planning as follows: Will discharge when the patient has reached their maximum functional potential and maximum safety in their home. Patient verbalized agreement with discharge planning. Clinical Assessment (What this means for the patient overall and need for ongoing skilled care):   Patient presents ambulating in home using rollator and minimal pain complaints this visit.   Patient stated Anais Done began some exercises during [facility] visit yesterday

## 2023-08-02 ENCOUNTER — HOME CARE VISIT (OUTPATIENT)
Facility: HOME HEALTH | Age: 88
End: 2023-08-02

## 2023-08-02 VITALS
SYSTOLIC BLOOD PRESSURE: 140 MMHG | DIASTOLIC BLOOD PRESSURE: 80 MMHG | RESPIRATION RATE: 16 BRPM | TEMPERATURE: 97.3 F | OXYGEN SATURATION: 98 % | HEART RATE: 97 BPM

## 2023-08-02 PROCEDURE — G0151 HHCP-SERV OF PT,EA 15 MIN: HCPCS

## 2023-08-02 NOTE — HOME HEALTH
Subjective: patient reports fall  Falls since last visit Yes(if yes complete the Fall Tracking Form and include bsrifallreport): Patient has a new large, step/platform at her bedside to facilitate getting in high bed easier and in a different manner than how she did prior to her kyphoplasty. Last night she reports wearing socks to bed and when she got out of bed and her feet met the platform she slipped and fell on her knees. She has some bruising on B lower legs, moreso on the R and a small skin tear on the R LE measuring 2cm X 1.5cm which is covered with a band aid. She was able to get herself back up without help and didn't tell anyone until her aide came this morning. PT recommended lowering her bed height which she will have family do. In the meantime she is going to get in bed as she had been prior to the kyphoplasty where she faces the bed and rolls in, not twisting her spine and is not going to use the step/platform. If she wears socks she is going to wear gripper socks. Caregiver involvement changes: none  Home health supplies by type and quantity ordered/delivered this visit include: n/a    Clinician asked if patient has had any physician contact since last home care visit and patient states: NO  Clinician asked if patient has any new or changed medications and patient states:  NO   If Yes, were medications reconciled? N/A   Was the certifying physician notified of changes in medications? N/A     Clinical assessment (what this visit means for the patient overall and need for ongoing skilled care) and progress or lack of progress towards SPECIFIC goals: despite patient fall, she has no R hip pain which was the reason for PT. PT instructed patient in fall prevention measures going forward to prevent future falls. PT reviewed HEP for better patient understanding so R hip sciatic pain does not return.  the R hip remains weak and there is obvious atrophy and she will benefit from R hip strengthening in
No

## 2023-08-08 ENCOUNTER — PATIENT MESSAGE (OUTPATIENT)
Age: 88
End: 2023-08-08

## 2023-08-08 DIAGNOSIS — E03.9 ACQUIRED HYPOTHYROIDISM: Primary | ICD-10-CM

## 2023-08-08 RX ORDER — LEVOTHYROXINE SODIUM 0.05 MG/1
50 TABLET ORAL DAILY
Qty: 90 TABLET | Refills: 2 | Status: SHIPPED | OUTPATIENT
Start: 2023-08-08

## 2023-08-08 NOTE — TELEPHONE ENCOUNTER
PCP: Ankit Segura MD    Last appt: 7/10/2023       Future Appointments   Date Time Provider [address]   8/10/2023 [place] [id number] [address]   8/15/2023 [place] [id number] [address]   8/17/2023 [place] [address] [address]   1/8/2024 10:45 AM Zoie Miller MD [facility] AM[facility]       Requested Prescriptions     Pending Prescriptions Disp Refills    levothyroxine (SYNTHROID) 50 MCG tablet 90 tablet 2     Sig: Take 1 tablet by mouth daily       Prior labs and Blood pressures:  BP Readings from Last 3 Encounters:   08/02/23 (!) 140/80   08/01/23 (!) 144/80   07/31/23 (!) 160/70     Lab Results   Component Value Date/Time     06/26/2023 02:21 PM    K 4.6 06/26/2023 02:21 PM     06/26/2023 02:21 PM    CO2 27 06/26/2023 02:21 PM    BUN 17 06/26/2023 02:21 PM    GFRAA 59 06/24/2022 09:54 AM     No results found for: HBA1C, [id number]  Lab Results   Component Value Date/Time    CHOL 302 06/26/2023 02:21 PM    HDL 82 06/26/2023 02:21 PM     No results found for: VITD3, VD3RIA    Lab Results   Component Value Date/Time    TSH 1.81 10/14/2022 11:55 AM

## 2023-08-09 ENCOUNTER — HOME CARE VISIT (OUTPATIENT)
Facility: HOME HEALTH | Age: 88
End: 2023-08-09

## 2023-08-09 PROCEDURE — G0151 HHCP-SERV OF PT,EA 15 MIN: HCPCS

## 2023-08-09 ASSESSMENT — ENCOUNTER SYMPTOMS: HEMOPTYSIS: 0

## 2023-08-10 NOTE — HOME HEALTH
Subjective: Patient reports that she would like to resume most of her regular exercises   Falls since last visit - no  Caregiver involvement changes: no  Home health supplies by type and quantity ordered/delivered this visit include: no    Clinician asked if patient has had any physician contact since last home care visit and patient states: no  Clinician asked if patient has any new or changed medications and patient states:  no  If Yes, were medications reconciled? na  Was the certifying physician notified of changes in medications? yes    Clinical assessment (what this visit means for the patient overall and need for ongoing skilled care) and progress or lack of progress towards SPECIFIC goals: Todays treatment focused on safety, resumption of B UE and B LE exercises , and gait training, patient is making fast progress towards goals and was able to tolerate addition of new exercises with symtom control. Patient does continue to require supervision and rollator for safe ambulation and required multiple cues for correct ther ex technique.  Patient has not met goals and will require further PT intervention to ensure safety, prevent falls and acheive goals     Written Teaching Material Utilized: written copy of exercsies given to patient     Interdisciplinary communication with: PT only service in     Discharge planning as follows: reassess at the end of the orders     Specific plan for next visit: ensure safety and correct technqiue with her exercises

## 2023-08-11 ENCOUNTER — HOME CARE VISIT (OUTPATIENT)
Facility: HOME HEALTH | Age: 88
End: 2023-08-11

## 2023-08-11 VITALS
OXYGEN SATURATION: 98 % | HEART RATE: 70 BPM | TEMPERATURE: 98.1 F | SYSTOLIC BLOOD PRESSURE: 124 MMHG | RESPIRATION RATE: 17 BRPM | DIASTOLIC BLOOD PRESSURE: 67 MMHG

## 2023-08-11 PROCEDURE — G0151 HHCP-SERV OF PT,EA 15 MIN: HCPCS

## 2023-08-11 ASSESSMENT — ENCOUNTER SYMPTOMS: HEMOPTYSIS: 0

## 2023-08-12 NOTE — HOME HEALTH
Subjective: Patient reports that she is doing well today  Falls since last visit - no  Caregiver involvement changes: no  Home health supplies by type and quantity ordered/delivered this visit include: no    Clinician asked if patient has had any physician contact since last home care visit and patient states: no  Clinician asked if patient has any new or changed medications and patient states: no  If Yes, were medications reconciled? yes  Was the certifying physician notified of changes in medications? na    Clinical assessment (what this visit means for the patient overall and need for ongoing skilled care) and progress or lack of progress towards SPECIFIC goals: Todays treatment focused on progression of HEP and gait training. Patient continues to make progress with PT and was able to tolerate progression of HEP without increase in pain or symtpoms. Patient is now independent with transfers but does continue to require hands on assist for gait.  Patient will benefit from one additional PT visit to ensure gait and balance goals are met but patient is on targert to acheive goals early    Written Teaching Material Utilized: written copy of exercises given to patient     Interdisciplinary communication with: PT only    Discharge planning as follows: reassess for discharge next visit     Specific plan for next visit: ensure HEP and gait goals are met

## 2023-08-16 ENCOUNTER — HOME CARE VISIT (OUTPATIENT)
Facility: HOME HEALTH | Age: 88
End: 2023-08-16
Payer: MEDICARE

## 2023-08-16 VITALS
RESPIRATION RATE: 17 BRPM | SYSTOLIC BLOOD PRESSURE: 125 MMHG | DIASTOLIC BLOOD PRESSURE: 70 MMHG | TEMPERATURE: 98 F | OXYGEN SATURATION: 97 % | HEART RATE: 70 BPM

## 2023-08-16 PROCEDURE — G0151 HHCP-SERV OF PT,EA 15 MIN: HCPCS

## 2023-08-16 ASSESSMENT — ENCOUNTER SYMPTOMS: HEMOPTYSIS: 0

## 2023-08-17 NOTE — HOME HEALTH
Subjective: Patient reports that she is ready for discharge   Falls since last visit - no  Caregiver involvement changes: no  Home health supplies by type and quantity ordered/delivered this visit include: no    Clinician asked if patient has had any physician contact since last home care visit and patient states: no  Clinician asked if patient has any new or changed medications and patient states: no  If Yes, were medications reconciled? yes  Was the certifying physician notified of changes in medications? na    Clinical assessment (what this visit means for the patient overall and need for ongoing skilled care) and progress or lack of progress towards SPECIFIC goals: Patient was seen by PT for the past 3 weeks. PT sessions have included progression of HEP, ther ex, gait training, transfers, balance re ed, bed mobility and patient and caregiver education. Patient has been able to make significant progress with PT and is now mod independent for mobility using her rollator and mod independent for ADLs. Patient plans to continue her exercises on her own and requirests discharge from homecare at this time.     Written Teaching Material Utilized: written copy of exercises given to patient     Interdisciplinary communication with: PT only service in with patient     Discharge planning as follows: discharged    Specific plan for next visit: discharge

## 2023-10-17 ENCOUNTER — PATIENT MESSAGE (OUTPATIENT)
Age: 88
End: 2023-10-17

## 2023-10-17 RX ORDER — FLUOCINONIDE 0.5 MG/G
OINTMENT TOPICAL DAILY PRN
Qty: 30 G | Refills: 1 | Status: SHIPPED | OUTPATIENT
Start: 2023-10-17

## 2023-10-17 NOTE — TELEPHONE ENCOUNTER
PCP: Milton Rhoades MD    Last appt: 7/10/2023       Future Appointments   Date Time Provider [address]   1/8/2024 10:45 AM Milton Rhoades MD [facility]       Requested Prescriptions      No prescriptions requested or ordered in this encounter       Prior labs and Blood pressures:  BP Readings from Last 3 Encounters:   08/16/23 125/70   08/11/23 124/67   08/02/23 (!) 140/80     Lab Results   Component Value Date/Time     06/26/2023 02:21 PM    K 4.6 06/26/2023 02:21 PM     06/26/2023 02:21 PM    CO2 27 06/26/2023 02:21 PM    BUN 17 06/26/2023 02:21 PM    GFRAA 59 06/24/2022 09:54 AM     No results found for: \"HBA1C\", \"[id number]\"  Lab Results   Component Value Date/Time    CHOL 302 06/26/2023 02:21 PM    HDL 82 06/26/2023 02:21 PM     No results found for: \"VITD3\", \"VD3RIA\"    Lab Results   Component Value Date/Time    TSH 1.81 10/14/2022 11:55 AM

## 2023-11-01 DIAGNOSIS — G47.00 INSOMNIA, UNSPECIFIED TYPE: ICD-10-CM

## 2023-11-02 DIAGNOSIS — E03.9 ACQUIRED HYPOTHYROIDISM: ICD-10-CM

## 2023-11-02 RX ORDER — TRAZODONE HYDROCHLORIDE 50 MG/1
50 TABLET ORAL DAILY PRN
Qty: 30 TABLET | Refills: 1 | Status: SHIPPED | OUTPATIENT
Start: 2023-11-02

## 2023-11-02 RX ORDER — LEVOTHYROXINE SODIUM 0.05 MG/1
50 TABLET ORAL DAILY
Qty: 90 TABLET | Refills: 2 | Status: SHIPPED | OUTPATIENT
Start: 2023-11-02

## 2023-11-02 RX ORDER — LEVOTHYROXINE SODIUM 0.05 MG/1
50 TABLET ORAL DAILY
Qty: 30 TABLET | Refills: 0 | Status: SHIPPED | OUTPATIENT
Start: 2023-11-02

## 2023-11-02 NOTE — TELEPHONE ENCOUNTER
MD Eloise Jenkins,    Per [employer] message, patient is out of levothyroxine 50mcg. Contacted [facility] and they do not have any rx on file. (8/8/23 90 + 2 not received)    Needs 30 d/s to [facility] ASAP and then a 90 d/s to [facility]. Thanks, Sussy Members    Last appointment: 7/10/23 Yumiko Salvage, labs 6/2023  Next appointment: 1/8/24 Eloise Jenkins  Previous refill encounter(s): 8/8/23 90 + 2 ([facility] states do not have this rx)- [facility] 6/24/22 90 + 3    Requested Prescriptions     Pending Prescriptions Disp Refills    levothyroxine (SYNTHROID) 50 MCG tablet 30 tablet 0     Sig: Take 1 tablet by mouth daily     For Pharmacy Admin Tracking Only    Program: Medication Refill  CPA in place:    Recommendation Provided To:    Intervention Detail: New Rx: 1, reason: Patient Preference  Intervention Accepted By:   Jimi Vallejo Closed?:    Time Spent (min): 10

## 2023-11-02 NOTE — TELEPHONE ENCOUNTER
Please send this one to [facility] per patient request.  Thanks, Ana Maria    Last appointment: 7/10/23 Lj Guthrie, labs 6/2023  Next appointment: 1/8/24 Feng Abbott  Previous refill encounter(s): 8/8/23 90 + 2 ([facility] states do not have this rx)- [facility] 6/24/22 90 + 3    Requested Prescriptions     Pending Prescriptions Disp Refills    levothyroxine (SYNTHROID) 50 MCG tablet 90 tablet 2     Sig: Take 1 tablet by mouth daily     For Pharmacy Admin Tracking Only    Program: Medication Refill  CPA in place:    Recommendation Provided To:    Intervention Detail: New Rx: 1, reason: Patient Preference  Intervention Accepted By:   Harmony Hartman Closed?:    Time Spent (min): 10

## 2023-11-02 NOTE — TELEPHONE ENCOUNTER
Pharmacy requesting refill.  Ana Maria Baez    Last appointment: 7/10/23 Jennifer  Next appointment: 1/8/24 Cam  Previous refill encounter(s): 7/10/23 30    Requested Prescriptions     Pending Prescriptions Disp Refills    traZODone (DESYREL) 50 MG tablet [Pharmacy Med Name: TRAZODONE 50 MG TABLET] 30 tablet 0     Sig: TAKE 1 TABLET BY MOUTH EVERY DAY AT BEDTIME AS NEEDED FOR SLEEP     For Pharmacy Admin Tracking Only    Program: Medication Refill  CPA in place:    Recommendation Provided To:   Intervention Detail: New Rx: 1, reason: Patient Preference  Intervention Accepted By:   Gap Closed?:    Time Spent (min): 5

## 2023-11-16 ENCOUNTER — PATIENT MESSAGE (OUTPATIENT)
Age: 88
End: 2023-11-16

## 2023-11-16 DIAGNOSIS — B02.22 POSTHERPETIC TRIGEMINAL NEURALGIA: ICD-10-CM

## 2023-11-16 RX ORDER — GABAPENTIN 100 MG/1
CAPSULE ORAL
Qty: 56 CAPSULE | Refills: 0 | Status: SHIPPED | OUTPATIENT
Start: 2023-11-16 | End: 2023-11-16 | Stop reason: SDUPTHER

## 2023-11-16 RX ORDER — GABAPENTIN 100 MG/1
CAPSULE ORAL
Qty: 120 CAPSULE | Refills: 2 | Status: SHIPPED | OUTPATIENT
Start: 2023-11-16 | End: 2023-12-31

## 2023-11-16 NOTE — TELEPHONE ENCOUNTER
PCP: Zoie Miller MD    Last appt: 7/10/2023       Future Appointments   Date Time Provider [address]   1/8/2024 10:45 AM Zoie Miller MD [facility]       Requested Prescriptions     Pending Prescriptions Disp Refills    gabapentin (NEURONTIN) 100 MG capsule 56 capsule 0     Sig: Take 1 tab in the morning, 2 tabs in the afternoon, and 1 tab in the evening (spaced equally apart)       Prior labs and Blood pressures:  BP Readings from Last 3 Encounters:   08/16/23 125/70   08/11/23 124/67   08/02/23 (!) 140/80     Lab Results   Component Value Date/Time     06/26/2023 02:21 PM    K 4.6 06/26/2023 02:21 PM     06/26/2023 02:21 PM    CO2 27 06/26/2023 02:21 PM    BUN 17 06/26/2023 02:21 PM    GFRAA 59 06/24/2022 09:54 AM     No results found for: \"HBA1C\", \"[id number]\"  Lab Results   Component Value Date/Time    CHOL 302 06/26/2023 02:21 PM    HDL 82 06/26/2023 02:21 PM     No results found for: \"VITD3\", \"VD3RIA\"    Lab Results   Component Value Date/Time    TSH 1.81 10/14/2022 11:55 AM

## 2023-11-24 ENCOUNTER — PATIENT MESSAGE (OUTPATIENT)
Age: 88
End: 2023-11-24

## 2023-11-27 RX ORDER — METRONIDAZOLE 10 MG/G
1 GEL TOPICAL DAILY
Qty: 60 G | Refills: 2 | Status: SHIPPED | OUTPATIENT
Start: 2023-11-27

## 2023-11-27 NOTE — TELEPHONE ENCOUNTER
From: Errol Meléndez  To: Dr. Soto Reanna: 11/24/2023 3:56 PM EST  Subject: Covid & RX    Checking in to tell you I had my covid shot Monday the 20h. First time ever my arm was extremely sore, ok now. Rosacea is blooming again and I need more Metronidazole Gel 1% from [facility]. Appreciate your ordering it for me. Nancy Martinez was good and now the hustle and bustle for [place]! ! [address] [facility],[address]

## 2024-01-08 ENCOUNTER — OFFICE VISIT (OUTPATIENT)
Age: 89
End: 2024-01-08
Payer: MEDICARE

## 2024-01-08 VITALS
BODY MASS INDEX: 24.69 KG/M2 | SYSTOLIC BLOOD PRESSURE: 140 MMHG | RESPIRATION RATE: 14 BRPM | HEIGHT: 65 IN | HEART RATE: 72 BPM | OXYGEN SATURATION: 98 % | TEMPERATURE: 98 F | WEIGHT: 148.2 LBS | DIASTOLIC BLOOD PRESSURE: 62 MMHG

## 2024-01-08 DIAGNOSIS — I10 HTN, GOAL BELOW 150/90: Primary | ICD-10-CM

## 2024-01-08 DIAGNOSIS — R39.15 URINARY URGENCY: ICD-10-CM

## 2024-01-08 DIAGNOSIS — E78.5 HYPERLIPIDEMIA, UNSPECIFIED HYPERLIPIDEMIA TYPE: ICD-10-CM

## 2024-01-08 DIAGNOSIS — E03.9 ACQUIRED HYPOTHYROIDISM: ICD-10-CM

## 2024-01-08 DIAGNOSIS — M85.89 OSTEOPENIA OF MULTIPLE SITES: ICD-10-CM

## 2024-01-08 DIAGNOSIS — N18.31 STAGE 3A CHRONIC KIDNEY DISEASE (HCC): ICD-10-CM

## 2024-01-08 LAB
25(OH)D3 SERPL-MCNC: 74.7 NG/ML (ref 30–100)
ALBUMIN SERPL-MCNC: 3.6 G/DL (ref 3.5–5)
ALBUMIN/GLOB SERPL: 0.9 (ref 1.1–2.2)
ALP SERPL-CCNC: 88 U/L (ref 45–117)
ALT SERPL-CCNC: 15 U/L (ref 12–78)
ANION GAP SERPL CALC-SCNC: 5 MMOL/L (ref 5–15)
AST SERPL-CCNC: 14 U/L (ref 15–37)
BILIRUB SERPL-MCNC: 0.6 MG/DL (ref 0.2–1)
BUN SERPL-MCNC: 20 MG/DL (ref 6–20)
BUN/CREAT SERPL: 19 (ref 12–20)
CALCIUM SERPL-MCNC: 9.4 MG/DL (ref 8.5–10.1)
CHLORIDE SERPL-SCNC: 106 MMOL/L (ref 97–108)
CHOLEST SERPL-MCNC: 277 MG/DL
CO2 SERPL-SCNC: 28 MMOL/L (ref 21–32)
CREAT SERPL-MCNC: 1.07 MG/DL (ref 0.55–1.02)
ERYTHROCYTE [DISTWIDTH] IN BLOOD BY AUTOMATED COUNT: 13.3 % (ref 11.5–14.5)
GLOBULIN SER CALC-MCNC: 3.8 G/DL (ref 2–4)
GLUCOSE SERPL-MCNC: 93 MG/DL (ref 65–100)
HCT VFR BLD AUTO: 44.7 % (ref 35–47)
HDLC SERPL-MCNC: 71 MG/DL
HDLC SERPL: 3.9 (ref 0–5)
HGB BLD-MCNC: 14.7 G/DL (ref 11.5–16)
LDLC SERPL CALC-MCNC: 182.4 MG/DL (ref 0–100)
MCH RBC QN AUTO: 30.8 PG (ref 26–34)
MCHC RBC AUTO-ENTMCNC: 32.9 G/DL (ref 30–36.5)
MCV RBC AUTO: 93.5 FL (ref 80–99)
NRBC # BLD: 0 K/UL (ref 0–0.01)
NRBC BLD-RTO: 0 PER 100 WBC
PLATELET # BLD AUTO: 308 K/UL (ref 150–400)
PMV BLD AUTO: 11.1 FL (ref 8.9–12.9)
POTASSIUM SERPL-SCNC: 4.2 MMOL/L (ref 3.5–5.1)
PROT SERPL-MCNC: 7.4 G/DL (ref 6.4–8.2)
RBC # BLD AUTO: 4.78 M/UL (ref 3.8–5.2)
SODIUM SERPL-SCNC: 139 MMOL/L (ref 136–145)
TRIGL SERPL-MCNC: 118 MG/DL
TSH SERPL DL<=0.05 MIU/L-ACNC: 1.97 UIU/ML (ref 0.36–3.74)
VLDLC SERPL CALC-MCNC: 23.6 MG/DL
WBC # BLD AUTO: 7.4 K/UL (ref 3.6–11)

## 2024-01-08 PROCEDURE — G8484 FLU IMMUNIZE NO ADMIN: HCPCS | Performed by: FAMILY MEDICINE

## 2024-01-08 PROCEDURE — 99214 OFFICE O/P EST MOD 30 MIN: CPT | Performed by: FAMILY MEDICINE

## 2024-01-08 PROCEDURE — 1036F TOBACCO NON-USER: CPT | Performed by: FAMILY MEDICINE

## 2024-01-08 PROCEDURE — 1123F ACP DISCUSS/DSCN MKR DOCD: CPT | Performed by: FAMILY MEDICINE

## 2024-01-08 PROCEDURE — G8420 CALC BMI NORM PARAMETERS: HCPCS | Performed by: FAMILY MEDICINE

## 2024-01-08 PROCEDURE — G8427 DOCREV CUR MEDS BY ELIG CLIN: HCPCS | Performed by: FAMILY MEDICINE

## 2024-01-08 PROCEDURE — 1090F PRES/ABSN URINE INCON ASSESS: CPT | Performed by: FAMILY MEDICINE

## 2024-01-08 ASSESSMENT — ENCOUNTER SYMPTOMS
VOMITING: 0
WHEEZING: 0
CHEST TIGHTNESS: 0
CONSTIPATION: 0
ABDOMINAL PAIN: 0
DIARRHEA: 0
NAUSEA: 0
SHORTNESS OF BREATH: 0
COUGH: 0

## 2024-01-08 ASSESSMENT — PATIENT HEALTH QUESTIONNAIRE - PHQ9
SUM OF ALL RESPONSES TO PHQ QUESTIONS 1-9: 0
SUM OF ALL RESPONSES TO PHQ QUESTIONS 1-9: 0
2. FEELING DOWN, DEPRESSED OR HOPELESS: 0
SUM OF ALL RESPONSES TO PHQ QUESTIONS 1-9: 0
SUM OF ALL RESPONSES TO PHQ QUESTIONS 1-9: 0
SUM OF ALL RESPONSES TO PHQ9 QUESTIONS 1 & 2: 0
1. LITTLE INTEREST OR PLEASURE IN DOING THINGS: 0

## 2024-01-08 NOTE — PROGRESS NOTES
Chief Complaint   Patient presents with    Hypertension     \"Have you been to the ER, urgent care clinic since your last visit?  Hospitalized since your last visit?\"    NO    “Have you seen or consulted any other health care providers outside of [facility] since your last visit?”    NO           Financial Resource Strain: Low Risk  (6/26/2023)    Overall Financial Resource Strain (CARDIA)     Difficulty of Paying Living Expenses: Not hard at all      Food Insecurity: Not on file (6/26/2023)            1/8/2024    10:56 AM   PHQ-9    Little interest or pleasure in doing things 0   Feeling down, depressed, or hopeless 0   PHQ-2 Score 0   PHQ-9 Total Score 0       Health Maintenance Due   Topic Date Due    Respiratory Syncytial Virus (RSV) Pregnant or age 60 yrs+ (1 - 1-dose 60+ series) Never done    COVID-19 Vaccine (6 - 2023-24 season) 09/01/2023

## 2024-01-08 NOTE — PROGRESS NOTES
Patient Name: Radhika Vasquez   MRN: [id number]    SUBJECTIVE  Radhika Vasquez is a 89 y.o. female who presents with the following:     She is currently taking gabapentin for trigeminal neuralgia.  Currently taking 100 mg in the morning, 200 mg in the afternoon, and 100 mg in the evening. Open to reducing it to 100 mg TID.  History of urinary urgency and frequent UTIs.  Is wondering if she can be on preventative antibiotics.  Per chart review, she has grown out several organisms on prior urine cultures.    BP Readings from Last 3 Encounters:   01/08/24 (!) 140/62   08/16/23 125/70   08/11/23 124/67     Wt Readings from Last 3 Encounters:   01/08/24 67.2 kg (148 lb 3.2 oz)   07/19/23 65.5 kg (144 lb 8 oz)   07/10/23 66.5 kg (146 lb 9.6 oz)         Review of Systems   Constitutional:  Negative for activity change, appetite change, fatigue, fever and unexpected weight change.   Respiratory:  Negative for cough, chest tightness, shortness of breath and wheezing.    Cardiovascular:  Negative for chest pain, palpitations and leg swelling.   Gastrointestinal:  Negative for abdominal pain, constipation, diarrhea, nausea and vomiting.   Genitourinary:  Negative for dysuria, frequency and urgency.   Skin:  Negative for rash.   Neurological:  Negative for dizziness, weakness and headaches.   Psychiatric/Behavioral:  Negative for dysphoric mood and suicidal ideas. The patient is not nervous/anxious.    All other systems reviewed and are negative.        The patient's medications, allergies, past medical history, surgical history, family history and social history were reviewed and updated where appropriate.    Current Outpatient Medications on File Prior to Visit   Medication Sig Dispense Refill    gabapentin (NEURONTIN) 100 MG capsule Take 1 tab in the morning, 2 tabs in the afternoon, and 1 tab in the evening (spaced equally apart) 120 capsule 2    levothyroxine (SYNTHROID) 50 MCG tablet Take 1 tablet by mouth daily 30 tablet

## 2024-01-10 LAB
BACTERIA SPEC CULT: ABNORMAL
CC UR VC: ABNORMAL
SERVICE CMNT-IMP: ABNORMAL

## 2024-01-10 RX ORDER — NITROFURANTOIN 25; 75 MG/1; MG/1
100 CAPSULE ORAL 2 TIMES DAILY
Qty: 10 CAPSULE | Refills: 0 | Status: SHIPPED | OUTPATIENT
Start: 2024-01-10 | End: 2024-01-15

## 2024-02-06 DIAGNOSIS — E03.9 ACQUIRED HYPOTHYROIDISM: ICD-10-CM

## 2024-02-06 RX ORDER — LEVOTHYROXINE SODIUM 0.05 MG/1
50 TABLET ORAL DAILY
Qty: 90 TABLET | Refills: 3 | Status: SHIPPED | OUTPATIENT
Start: 2024-02-06

## 2024-02-06 RX ORDER — FLUOCINONIDE 0.5 MG/G
OINTMENT TOPICAL 2 TIMES DAILY PRN
Qty: 30 G | Refills: 1 | Status: SHIPPED | OUTPATIENT
Start: 2024-02-06

## 2024-02-06 NOTE — TELEPHONE ENCOUNTER
----- Message from Radhika Vasquez sent at 2/6/2024  3:11 PM EST -----  Regarding: Prescriptions  Contact: [phone]  I was trying to get refills from [facility]  and they told me it had been filled with local Pharmacy.  I would like for you to send in two prescriptions to [facility]:  [id number] (??not sure but what bottle says) and fluocinonide.05% oint as my exzema is giving me a hard time..  Just let me know.  Thanks and HAPPY LOVE DAY, Radhika Vasquez

## 2024-02-06 NOTE — TELEPHONE ENCOUNTER
PCP: William Levy MD    Last appt: 1/8/2024       Future Appointments   Date Time Provider Department Center   7/8/2024 10:30 AM William Levy MD [facility] AMB       Requested Prescriptions     Pending Prescriptions Disp Refills    levothyroxine (SYNTHROID) 50 MCG tablet 90 tablet 1     Sig: Take 1 tablet by mouth daily       Prior labs and Blood pressures:  BP Readings from Last 3 Encounters:   01/08/24 (!) 140/62   08/16/23 125/70   08/11/23 124/67     Lab Results   Component Value Date/Time     01/08/2024 11:41 AM    K 4.2 01/08/2024 11:41 AM     01/08/2024 11:41 AM    CO2 28 01/08/2024 11:41 AM    BUN 20 01/08/2024 11:41 AM    GFRAA 59 06/24/2022 09:54 AM     No results found for: \"HBA1C\", \"[id number]\"  Lab Results   Component Value Date/Time    CHOL 277 01/08/2024 11:41 AM    HDL 71 01/08/2024 11:41 AM     No results found for: \"VITD3\", \"VD3RIA\"    Lab Results   Component Value Date/Time    TSH 1.81 10/14/2022 11:55 AM

## 2024-03-06 ENCOUNTER — PATIENT MESSAGE (OUTPATIENT)
Age: 89
End: 2024-03-06

## 2024-03-06 DIAGNOSIS — B02.22 POSTHERPETIC TRIGEMINAL NEURALGIA: ICD-10-CM

## 2024-03-06 NOTE — TELEPHONE ENCOUNTER
From: Radhika Vasquez  To: Dr. William Levy  Sent: 3/6/2024 1:33 PM EST  Subject: question and RX    Hi! Since it is taking longer than usual will you please order gabapentin from  for me. Also, the nurse from [facility] was just here as required. She asked if anyone had suggested I use estrogen cream or pills to prevent UTI's. She said in her experience with older clients, it had helped. Just checking???  Thanks, Radhika Vasquez

## 2024-03-07 RX ORDER — GABAPENTIN 100 MG/1
100 CAPSULE ORAL 3 TIMES DAILY
Qty: 90 CAPSULE | Refills: 2 | Status: SHIPPED | OUTPATIENT
Start: 2024-03-07 | End: 2024-04-06

## 2024-03-26 ENCOUNTER — PATIENT MESSAGE (OUTPATIENT)
Age: 89
End: 2024-03-26

## 2024-03-26 DIAGNOSIS — L30.9 ECZEMA, UNSPECIFIED TYPE: Primary | ICD-10-CM

## 2024-06-11 DIAGNOSIS — B02.22 POSTHERPETIC TRIGEMINAL NEURALGIA: ICD-10-CM

## 2024-06-12 RX ORDER — GABAPENTIN 100 MG/1
100 CAPSULE ORAL 3 TIMES DAILY
Qty: 90 CAPSULE | Refills: 2 | Status: SHIPPED | OUTPATIENT
Start: 2024-06-12 | End: 2024-07-12

## 2024-06-12 RX ORDER — LISINOPRIL 10 MG/1
10 TABLET ORAL DAILY
Qty: 90 TABLET | Refills: 3 | Status: SHIPPED | OUTPATIENT
Start: 2024-06-12

## 2024-06-12 NOTE — TELEPHONE ENCOUNTER
PCP: William Levy MD      Future Appointments   Date Time Provider Department Center   7/8/2024 10:30 AM William Levy MD [facility] AMB     Last seen: 1/8/2024    Requested Prescriptions     Pending Prescriptions Disp Refills    lisinopril (PRINIVIL;ZESTRIL) 10 MG tablet 90 tablet 3     Sig: Take 1 tablet by mouth daily    gabapentin (NEURONTIN) 100 MG capsule 90 capsule 2     Sig: Take 1 capsule by mouth 3 times daily for 30 days. Max Daily Amount: 300 mg

## 2024-06-25 ENCOUNTER — PATIENT MESSAGE (OUTPATIENT)
Age: 89
End: 2024-06-25

## 2024-06-25 DIAGNOSIS — B02.22 POSTHERPETIC TRIGEMINAL NEURALGIA: Primary | ICD-10-CM

## 2024-06-27 RX ORDER — LISINOPRIL 10 MG/1
10 TABLET ORAL DAILY
Qty: 30 TABLET | Refills: 0 | Status: SHIPPED | OUTPATIENT
Start: 2024-06-27

## 2024-06-27 RX ORDER — GABAPENTIN 100 MG/1
100 CAPSULE ORAL NIGHTLY
Qty: 90 CAPSULE | Refills: 0 | Status: SHIPPED | OUTPATIENT
Start: 2024-06-27 | End: 2024-07-27

## 2024-07-08 ENCOUNTER — OFFICE VISIT (OUTPATIENT)
Age: 89
End: 2024-07-08
Payer: MEDICARE

## 2024-07-08 VITALS
BODY MASS INDEX: 24.72 KG/M2 | WEIGHT: 148.4 LBS | RESPIRATION RATE: 16 BRPM | DIASTOLIC BLOOD PRESSURE: 72 MMHG | TEMPERATURE: 97.7 F | OXYGEN SATURATION: 99 % | HEIGHT: 65 IN | HEART RATE: 100 BPM | SYSTOLIC BLOOD PRESSURE: 144 MMHG

## 2024-07-08 DIAGNOSIS — Z00.00 ENCOUNTER FOR MEDICARE ANNUAL WELLNESS EXAM: Primary | ICD-10-CM

## 2024-07-08 DIAGNOSIS — Z13.820 SCREENING FOR OSTEOPOROSIS: ICD-10-CM

## 2024-07-08 DIAGNOSIS — I10 HTN, GOAL BELOW 150/90: ICD-10-CM

## 2024-07-08 DIAGNOSIS — E03.9 ACQUIRED HYPOTHYROIDISM: ICD-10-CM

## 2024-07-08 DIAGNOSIS — R01.1 HEART MURMUR: ICD-10-CM

## 2024-07-08 DIAGNOSIS — Z78.0 POSTMENOPAUSAL: ICD-10-CM

## 2024-07-08 DIAGNOSIS — Z51.81 MEDICATION MONITORING ENCOUNTER: ICD-10-CM

## 2024-07-08 DIAGNOSIS — B02.22 POSTHERPETIC TRIGEMINAL NEURALGIA: ICD-10-CM

## 2024-07-08 PROCEDURE — 1123F ACP DISCUSS/DSCN MKR DOCD: CPT | Performed by: FAMILY MEDICINE

## 2024-07-08 PROCEDURE — G0439 PPPS, SUBSEQ VISIT: HCPCS | Performed by: FAMILY MEDICINE

## 2024-07-08 PROCEDURE — G8420 CALC BMI NORM PARAMETERS: HCPCS | Performed by: FAMILY MEDICINE

## 2024-07-08 PROCEDURE — G8427 DOCREV CUR MEDS BY ELIG CLIN: HCPCS | Performed by: FAMILY MEDICINE

## 2024-07-08 PROCEDURE — 1036F TOBACCO NON-USER: CPT | Performed by: FAMILY MEDICINE

## 2024-07-08 PROCEDURE — 99214 OFFICE O/P EST MOD 30 MIN: CPT | Performed by: FAMILY MEDICINE

## 2024-07-08 PROCEDURE — 1090F PRES/ABSN URINE INCON ASSESS: CPT | Performed by: FAMILY MEDICINE

## 2024-07-08 SDOH — ECONOMIC STABILITY: FOOD INSECURITY: WITHIN THE PAST 12 MONTHS, YOU WORRIED THAT YOUR FOOD WOULD RUN OUT BEFORE YOU GOT MONEY TO BUY MORE.: NEVER TRUE

## 2024-07-08 SDOH — ECONOMIC STABILITY: FOOD INSECURITY: WITHIN THE PAST 12 MONTHS, THE FOOD YOU BOUGHT JUST DIDN'T LAST AND YOU DIDN'T HAVE MONEY TO GET MORE.: NEVER TRUE

## 2024-07-08 SDOH — ECONOMIC STABILITY: INCOME INSECURITY: HOW HARD IS IT FOR YOU TO PAY FOR THE VERY BASICS LIKE FOOD, HOUSING, MEDICAL CARE, AND HEATING?: NOT HARD AT ALL

## 2024-07-08 ASSESSMENT — ENCOUNTER SYMPTOMS
WHEEZING: 0
NAUSEA: 0
SHORTNESS OF BREATH: 0
CHEST TIGHTNESS: 0
DIARRHEA: 0
COUGH: 0
CONSTIPATION: 0
ABDOMINAL PAIN: 0
VOMITING: 0

## 2024-07-08 ASSESSMENT — PATIENT HEALTH QUESTIONNAIRE - PHQ9
SUM OF ALL RESPONSES TO PHQ QUESTIONS 1-9: 0
SUM OF ALL RESPONSES TO PHQ9 QUESTIONS 1 & 2: 0
SUM OF ALL RESPONSES TO PHQ QUESTIONS 1-9: 0
2. FEELING DOWN, DEPRESSED OR HOPELESS: NOT AT ALL
SUM OF ALL RESPONSES TO PHQ QUESTIONS 1-9: 0
1. LITTLE INTEREST OR PLEASURE IN DOING THINGS: NOT AT ALL
SUM OF ALL RESPONSES TO PHQ QUESTIONS 1-9: 0

## 2024-07-08 ASSESSMENT — LIFESTYLE VARIABLES
HOW MANY STANDARD DRINKS CONTAINING ALCOHOL DO YOU HAVE ON A TYPICAL DAY: 1 OR 2
HOW OFTEN DO YOU HAVE A DRINK CONTAINING ALCOHOL: 2-3 TIMES A WEEK

## 2024-07-08 NOTE — PATIENT INSTRUCTIONS
In August, to schedule the test that I ordered for you, please call Central Scheduling at [phone].

## 2024-07-08 NOTE — PROGRESS NOTES
Chief Complaint   Patient presents with    Medicare AWV     \"Have you been to the ER, urgent care clinic since your last visit?  Hospitalized since your last visit?\"    NO    “Have you seen or consulted any other health care providers outside of [facility] since your last visit?”    NO      Financial Resource Strain: Low Risk  (7/8/2024)    Overall Financial Resource Strain (CARDIA)     Difficulty of Paying Living Expenses: Not hard at all      Food Insecurity: No Food Insecurity (7/8/2024)    Hunger Vital Sign     Worried About Running Out of Food in the Last Year: Never true     Ran Out of Food in the Last Year: Never true            7/8/2024    10:39 AM   PHQ-9    Little interest or pleasure in doing things 0   Feeling down, depressed, or hopeless 0   PHQ-2 Score 0   PHQ-9 Total Score 0       Health Maintenance Due   Topic Date Due    Respiratory Syncytial Virus (RSV) Pregnant or age 60 yrs+ (1 - 1-dose 60+ series) Never done    COVID-19 Vaccine (6 - 2023-24 season) 09/01/2023    Annual Wellness Visit (Medicare)  06/26/2024             
  Weight: 67.3 kg (148 lb 6.4 oz)    Height: 1.651 m (5' 5\")       Body mass index is 24.7 kg/m².            Allergies   Allergen Reactions    Diclofenac Sodium Anaphylaxis    Azithromycin     Bacitracin-Polymyxin B     Cedar Leaf Oil Other (See Comments)    Iron Other (See Comments)     GI UPSET AND DIZZINESS     Neosporin Original [Neomycin-Bacitracin Zn-Polymyx] Other (See Comments)    Pregabalin Other (See Comments)    Sulfa Antibiotics Other (See Comments)    Sulfur     Voltaren [Diclofenac]     Benzalkonium Chloride Rash     Prior to Visit Medications    Medication Sig Taking? Authorizing Provider   lisinopril (PRINIVIL;ZESTRIL) 10 MG tablet Take 1 tablet by mouth daily Yes William Levy MD   gabapentin (NEURONTIN) 100 MG capsule Take 1 capsule by mouth nightly for 30 days. Max Daily Amount: 100 mg Yes William Levy MD   lisinopril (PRINIVIL;ZESTRIL) 10 MG tablet Take 1 tablet by mouth daily Yes William Levy MD   gabapentin (NEURONTIN) 100 MG capsule Take 1 capsule by mouth 3 times daily for 30 days. Max Daily Amount: 300 mg Yes William Levy MD   fluocinolone 0.01 % cream APPLY TO AFFECTED AREA 4-6 TIMES DAILY Yes Provider, MD Brandon   levothyroxine (SYNTHROID) 50 MCG tablet Take 1 tablet by mouth daily Yes William Levy MD   fluocinonide (LIDEX) 0.05 % ointment Apply topically 2 times daily as needed (eczema) Apply topically 2 times daily. Yes William Levy MD   doxycycline hyclate (VIBRAMYCIN) 100 MG capsule Take 1 capsule by mouth as needed (UTI) 2x/day as needed Yes Automatic Reconciliation, Ar   oxybutynin (DITROPAN) 5 MG tablet Take 1 tablet by mouth daily as needed (bladder control) Yes Automatic Reconciliation, Ar       CareTeam (Including outside providers/suppliers regularly involved in providing care):   Patient Care Team:  William Levy MD as PCP - General  William Levy MD as PCP - Empaneled Provider  Bj Ray MD (Pain Management)     
     Conjunctiva/sclera: Conjunctivae normal.      Pupils: Pupils are equal, round, and reactive to light.   Cardiovascular:      Rate and Rhythm: Normal rate and regular rhythm.      Pulses: Normal pulses.      Heart sounds: Murmur (best heard at LUSB) heard.      Systolic murmur is present with a grade of 2/6.      No friction rub. No gallop.   Pulmonary:      Effort: Pulmonary effort is normal. No respiratory distress.      Breath sounds: Normal breath sounds. No stridor. No wheezing, rhonchi or rales.   Musculoskeletal:      Cervical back: Normal range of motion.   Skin:     General: Skin is warm.      Findings: No rash.   Neurological:      General: No focal deficit present.      Mental Status: She is alert and oriented to person, place, and time. Mental status is at baseline.         Treatment risks/benefits/costs/interactions/alternatives discussed with patient.  Advised patient to call back or return to office if symptoms worsen/change/persist. If patient cannot reach us or should anything more severe/urgent arise he/she should proceed directly to the nearest emergency department.  Discussed expected course/resolution/complications of diagnosis in detail with patient.  Patient expressed understanding with the diagnosis and plan.     This dictation may have been completed with Dragon, the [employer] voice recognition software.  Unanticipated grammatical, syntax, homophones, and other interpretive errors are sometimes inadvertently transcribed by the computer software.  Please disregard any errors that have escaped final proofreading.      William Levy M.D.

## 2024-07-09 LAB
ALBUMIN SERPL-MCNC: 3.5 G/DL (ref 3.5–5)
ALBUMIN/GLOB SERPL: 1.1 (ref 1.1–2.2)
ALP SERPL-CCNC: 84 U/L (ref 45–117)
ALT SERPL-CCNC: 12 U/L (ref 12–78)
ANION GAP SERPL CALC-SCNC: 6 MMOL/L (ref 5–15)
AST SERPL-CCNC: 11 U/L (ref 15–37)
BILIRUB SERPL-MCNC: 0.8 MG/DL (ref 0.2–1)
BUN SERPL-MCNC: 17 MG/DL (ref 6–20)
BUN/CREAT SERPL: 16 (ref 12–20)
CALCIUM SERPL-MCNC: 9.2 MG/DL (ref 8.5–10.1)
CHLORIDE SERPL-SCNC: 103 MMOL/L (ref 97–108)
CO2 SERPL-SCNC: 27 MMOL/L (ref 21–32)
CREAT SERPL-MCNC: 1.07 MG/DL (ref 0.55–1.02)
ERYTHROCYTE [DISTWIDTH] IN BLOOD BY AUTOMATED COUNT: 14.6 % (ref 11.5–14.5)
GLOBULIN SER CALC-MCNC: 3.3 G/DL (ref 2–4)
GLUCOSE SERPL-MCNC: 87 MG/DL (ref 65–100)
HCT VFR BLD AUTO: 42.4 % (ref 35–47)
HGB BLD-MCNC: 13.6 G/DL (ref 11.5–16)
MCH RBC QN AUTO: 29.7 PG (ref 26–34)
MCHC RBC AUTO-ENTMCNC: 32.1 G/DL (ref 30–36.5)
MCV RBC AUTO: 92.6 FL (ref 80–99)
NRBC # BLD: 0 K/UL (ref 0–0.01)
NRBC BLD-RTO: 0 PER 100 WBC
PLATELET # BLD AUTO: 311 K/UL (ref 150–400)
PMV BLD AUTO: 10.3 FL (ref 8.9–12.9)
POTASSIUM SERPL-SCNC: 4.7 MMOL/L (ref 3.5–5.1)
PROT SERPL-MCNC: 6.8 G/DL (ref 6.4–8.2)
RBC # BLD AUTO: 4.58 M/UL (ref 3.8–5.2)
SODIUM SERPL-SCNC: 136 MMOL/L (ref 136–145)
T4 FREE SERPL-MCNC: 1.2 NG/DL (ref 0.8–1.5)
TSH SERPL DL<=0.05 MIU/L-ACNC: 1.39 UIU/ML (ref 0.36–3.74)
WBC # BLD AUTO: 7 K/UL (ref 3.6–11)

## 2024-07-22 RX ORDER — LISINOPRIL 10 MG/1
10 TABLET ORAL DAILY
Qty: 30 TABLET | Refills: 0 | Status: SHIPPED | OUTPATIENT
Start: 2024-07-22

## 2024-07-22 NOTE — TELEPHONE ENCOUNTER
PCP: William Levy MD    Last appt: 7/8/2024       Future Appointments   Date Time Provider Department Center   8/7/2024 10:30 AM S[facility] DEXA 1 [facility]MO [place] [facility]   1/10/2025 10:45 AM William Levy MD [facility] BS AMB       Requested Prescriptions     Pending Prescriptions Disp Refills    lisinopril (PRINIVIL;ZESTRIL) 10 MG tablet [Pharmacy Med Name: LISINOPRIL 10 MG TABLET] 90 tablet 1     Sig: TAKE 1 TABLET BY MOUTH EVERY DAY       Prior labs and Blood pressures:  BP Readings from Last 3 Encounters:   07/08/24 (!) 144/72   01/08/24 (!) 140/62   08/16/23 125/70     Lab Results   Component Value Date/Time     07/08/2024 11:16 AM    K 4.7 07/08/2024 11:16 AM     07/08/2024 11:16 AM    CO2 27 07/08/2024 11:16 AM    BUN 17 07/08/2024 11:16 AM    GFRAA 59 06/24/2022 09:54 AM     No results found for: \"HBA1C\", \"[id number]\"  Lab Results   Component Value Date/Time    CHOL 277 01/08/2024 11:41 AM    HDL 71 01/08/2024 11:41 AM    .4 01/08/2024 11:41 AM    VLDL 23.6 01/08/2024 11:41 AM     No results found for: \"VITD3\"    Lab Results   Component Value Date/Time    TSH 1.39 07/08/2024 11:16 AM

## 2024-08-05 RX ORDER — LISINOPRIL 10 MG/1
10 TABLET ORAL DAILY
Qty: 90 TABLET | Refills: 1 | OUTPATIENT
Start: 2024-08-05

## 2024-08-05 NOTE — TELEPHONE ENCOUNTER
PCP: William Levy MD    Last appt: 7/8/2024   Future Appointments   Date Time Provider Department Center   8/7/2024 10:30 AM SPT DEXA 1 SPTMAMMO [place] [facility]   1/10/2025 10:45 AM William Levy MD [facility] DEP       Requested Prescriptions     Pending Prescriptions Disp Refills    lisinopril (PRINIVIL;ZESTRIL) 10 MG tablet [Pharmacy Med Name: LISINOPRIL 10 MG TABLET] 90 tablet 1     Sig: TAKE 1 TABLET BY MOUTH EVERY DAY         Prior labs and Blood pressures:  BP Readings from Last 3 Encounters:   07/08/24 (!) 144/72   01/08/24 (!) 140/62   08/16/23 125/70     Lab Results   Component Value Date/Time     07/08/2024 11:16 AM    K 4.7 07/08/2024 11:16 AM     07/08/2024 11:16 AM    CO2 27 07/08/2024 11:16 AM    BUN 17 07/08/2024 11:16 AM    GFRAA 59 06/24/2022 09:54 AM     No results found for: \"HBA1C\", \"[id number]\"  Lab Results   Component Value Date/Time    CHOL 277 01/08/2024 11:41 AM    HDL 71 01/08/2024 11:41 AM    .4 01/08/2024 11:41 AM    VLDL 23.6 01/08/2024 11:41 AM     No results found for: \"VITD3\"    Lab Results   Component Value Date/Time    TSH 1.39 07/08/2024 11:16 AM

## 2024-08-07 ENCOUNTER — HOSPITAL ENCOUNTER (OUTPATIENT)
Facility: HOSPITAL | Age: 89
Discharge: HOME | End: 2024-08-10
Payer: MEDICARE

## 2024-08-07 DIAGNOSIS — Z13.820 SCREENING FOR OSTEOPOROSIS: ICD-10-CM

## 2024-08-07 DIAGNOSIS — Z78.0 POSTMENOPAUSAL: ICD-10-CM

## 2024-08-07 PROCEDURE — 77080 DXA BONE DENSITY AXIAL: CPT

## 2024-09-16 ENCOUNTER — PATIENT MESSAGE (OUTPATIENT)
Age: 89
End: 2024-09-16

## 2024-09-25 ENCOUNTER — PATIENT MESSAGE (OUTPATIENT)
Age: 89
End: 2024-09-25

## 2024-09-25 DIAGNOSIS — B02.22 POSTHERPETIC TRIGEMINAL NEURALGIA: ICD-10-CM

## 2024-09-25 NOTE — TELEPHONE ENCOUNTER
PCP: William Levy MD    Last appt: 7/8/2024       Future Appointments   Date Time Provider Department Center   1/10/2025 10:45 AM William Levy MD [facility] DEP       Requested Prescriptions     Pending Prescriptions Disp Refills    gabapentin (NEURONTIN) 100 MG capsule 90 capsule 2     Sig: Take 1 capsule by mouth 3 times daily for 30 days. Max Daily Amount: 300 mg       Prior labs and Blood pressures:  BP Readings from Last 3 Encounters:   07/08/24 (!) 144/72   01/08/24 (!) 140/62   08/16/23 125/70     Lab Results   Component Value Date/Time     07/08/2024 11:16 AM    K 4.7 07/08/2024 11:16 AM     07/08/2024 11:16 AM    CO2 27 07/08/2024 11:16 AM    BUN 17 07/08/2024 11:16 AM    GFRAA 59 06/24/2022 09:54 AM     No results found for: \"HBA1C\", \"[id number]\"  Lab Results   Component Value Date/Time    CHOL 277 01/08/2024 11:41 AM    HDL 71 01/08/2024 11:41 AM    .4 01/08/2024 11:41 AM    VLDL 23.6 01/08/2024 11:41 AM     No results found for: \"VITD3\"    Lab Results   Component Value Date/Time    TSH 1.39 07/08/2024 11:16 AM

## 2024-09-26 RX ORDER — GABAPENTIN 100 MG/1
100 CAPSULE ORAL 3 TIMES DAILY
Qty: 90 CAPSULE | Refills: 2 | OUTPATIENT
Start: 2024-09-26 | End: 2024-10-26

## 2024-09-26 RX ORDER — FLUOCINONIDE 0.5 MG/G
CREAM TOPICAL 2 TIMES DAILY PRN
Qty: 30 G | Refills: 1 | Status: SHIPPED | OUTPATIENT
Start: 2024-09-26

## 2024-09-26 RX ORDER — GABAPENTIN 100 MG/1
100 CAPSULE ORAL NIGHTLY
Qty: 90 CAPSULE | Refills: 0 | Status: SHIPPED | OUTPATIENT
Start: 2024-09-26 | End: 2024-10-26

## 2024-10-23 ENCOUNTER — TELEPHONE (OUTPATIENT)
Age: 89
End: 2024-10-23

## 2024-10-23 ENCOUNTER — HOSPITAL ENCOUNTER (EMERGENCY)
Facility: HOSPITAL | Age: 89
Discharge: HOME | End: 2024-10-23
Attending: EMERGENCY MEDICINE
Payer: MEDICARE

## 2024-10-23 VITALS
BODY MASS INDEX: 25.34 KG/M2 | HEIGHT: 65 IN | OXYGEN SATURATION: 98 % | TEMPERATURE: 98.1 F | WEIGHT: 152.12 LBS | RESPIRATION RATE: 16 BRPM | HEART RATE: 71 BPM | DIASTOLIC BLOOD PRESSURE: 77 MMHG | SYSTOLIC BLOOD PRESSURE: 159 MMHG

## 2024-10-23 DIAGNOSIS — N30.00 ACUTE CYSTITIS WITHOUT HEMATURIA: Primary | ICD-10-CM

## 2024-10-23 LAB
APPEARANCE UR: CLEAR
BACTERIA URNS QL MICRO: ABNORMAL /HPF
BILIRUB UR QL: NEGATIVE
COLOR UR: ABNORMAL
EPITH CASTS URNS QL MICRO: ABNORMAL /LPF
GLUCOSE UR STRIP.AUTO-MCNC: NEGATIVE MG/DL
HGB UR QL STRIP: ABNORMAL
KETONES UR QL STRIP.AUTO: NEGATIVE MG/DL
LEUKOCYTE ESTERASE UR QL STRIP.AUTO: ABNORMAL
NITRITE UR QL STRIP.AUTO: NEGATIVE
PH UR STRIP: 7.5 (ref 5–8)
PROT UR STRIP-MCNC: NEGATIVE MG/DL
RBC #/AREA URNS HPF: ABNORMAL /HPF (ref 0–5)
SP GR UR REFRACTOMETRY: 1.01 (ref 1–1.03)
UROBILINOGEN UR QL STRIP.AUTO: 0.2 EU/DL (ref 0.2–1)
WBC URNS QL MICRO: ABNORMAL /HPF (ref 0–4)

## 2024-10-23 PROCEDURE — 99283 EMERGENCY DEPT VISIT LOW MDM: CPT

## 2024-10-23 PROCEDURE — 6370000000 HC RX 637 (ALT 250 FOR IP): Performed by: EMERGENCY MEDICINE

## 2024-10-23 PROCEDURE — 81001 URINALYSIS AUTO W/SCOPE: CPT

## 2024-10-23 PROCEDURE — 87186 SC STD MICRODIL/AGAR DIL: CPT

## 2024-10-23 PROCEDURE — 87086 URINE CULTURE/COLONY COUNT: CPT

## 2024-10-23 RX ORDER — CEPHALEXIN 500 MG/1
500 CAPSULE ORAL 4 TIMES DAILY
Qty: 28 CAPSULE | Refills: 0 | Status: SHIPPED | OUTPATIENT
Start: 2024-10-23 | End: 2024-10-23

## 2024-10-23 RX ORDER — NITROFURANTOIN 25; 75 MG/1; MG/1
100 CAPSULE ORAL 2 TIMES DAILY
Qty: 20 CAPSULE | Refills: 0 | Status: SHIPPED | OUTPATIENT
Start: 2024-10-23 | End: 2024-11-02

## 2024-10-23 RX ORDER — NITROFURANTOIN 25; 75 MG/1; MG/1
100 CAPSULE ORAL
Status: COMPLETED | OUTPATIENT
Start: 2024-10-23 | End: 2024-10-23

## 2024-10-23 RX ADMIN — NITROFURANTOIN (MONOHYDRATE/MACROCRYSTALS) 100 MG: 75; 25 CAPSULE ORAL at 13:50

## 2024-10-23 NOTE — ED NOTES
Patient left ED in no acute distress, alert and oriented x4. Patient was encourage to come back if symptoms get worse. Patient was provided with discharge instructions and prescriptions. All questions were answered. Patient left ambulatory.      [facility]

## 2024-10-23 NOTE — ED TRIAGE NOTES
Pt recently dx with UTI--finished antibx-- is here for more because she could not get a urology appt. Symptoms include dysuria, urinary frequency.

## 2024-10-23 NOTE — ED PROVIDER NOTES
TRANSFORAMINAL EPIDURAL SINGLE  6/21/2019    IR LUMBAR TRANSFORAMINAL EPIDURAL SINGLE  12/20/2019    ROTATOR CUFF REPAIR Right 2001    TOTAL KNEE ARTHROPLASTY Right 2014    US ASP BREAST CYST RIGHT Right 4/16/2018    US BREAST CYST ASPIRATION RIGHT 4/16/2018 [facility] RAD MAMMO       CURRENT MEDICATIONS       Previous Medications    DOXYCYCLINE HYCLATE (VIBRAMYCIN) 100 MG CAPSULE    Take 1 capsule by mouth as needed (UTI) 2x/day as needed    FLUOCINONIDE (LIDEX) 0.05 % CREAM    Apply topically 2 times daily as needed (eczema)    GABAPENTIN (NEURONTIN) 100 MG CAPSULE    Take 1 capsule by mouth nightly for 30 days. Max Daily Amount: 100 mg    LEVOTHYROXINE (SYNTHROID) 50 MCG TABLET    Take 1 tablet by mouth daily    LISINOPRIL (PRINIVIL;ZESTRIL) 10 MG TABLET    Take 1 tablet by mouth daily    LISINOPRIL (PRINIVIL;ZESTRIL) 10 MG TABLET    TAKE 1 TABLET BY MOUTH EVERY DAY    OXYBUTYNIN (DITROPAN) 5 MG TABLET    Take 1 tablet by mouth daily as needed (bladder control)       ALLERGIES     Diclofenac sodium, Azithromycin, Bacitracin-polymyxin b, Cedar leaf oil, Iron, Neosporin original [neomycin-bacitracin zn-polymyx], Pregabalin, Sulfa antibiotics, Sulfur, Voltaren [diclofenac], and Benzalkonium chloride    FAMILY HISTORY       Family History   Problem Relation Age of Onset    Heart Attack Father 52    Breast Cancer Maternal Aunt     Other Daughter         severe hot flashes    Breast Cancer Mother 68    Heart Failure Mother 75    Other Mother         severe hot flashes        SOCIAL HISTORY       Social History     Socioeconomic History    Marital status:    Tobacco Use    Smoking status: Former    Smokeless tobacco: Never   Substance and Sexual Activity    Alcohol use: Yes     Alcohol/week: 14.0 standard drinks of alcohol    Drug use: No    Sexual activity: Not Currently     Partners: Male   Social History Narrative    Lives in cottage, independent living, [facility]  Daughter Esme Dailey lives nearby     [facility]

## 2024-10-23 NOTE — TELEPHONE ENCOUNTER
Pt caregiver called stating pt has another UTI within 3 week pt caregiver is asking for a prescription can be sent in. I stated to pt that I have a opening to @3:40 with Dr. Hui pt Denied it. I stated to pt that she can go to [facility] urgent care.      BC[facility] [phone]

## 2024-10-27 LAB
BACTERIA SPEC CULT: ABNORMAL
BACTERIA SPEC CULT: ABNORMAL
CC UR VC: ABNORMAL
SERVICE CMNT-IMP: ABNORMAL

## 2024-10-27 RX ORDER — CIPROFLOXACIN 250 MG/1
250 TABLET, FILM COATED ORAL 2 TIMES DAILY
Qty: 14 TABLET | Refills: 0 | Status: SHIPPED | OUTPATIENT
Start: 2024-10-27 | End: 2024-11-03

## 2024-11-12 ENCOUNTER — PATIENT MESSAGE (OUTPATIENT)
Age: 89
End: 2024-11-12

## 2024-11-12 DIAGNOSIS — B02.22 POSTHERPETIC TRIGEMINAL NEURALGIA: ICD-10-CM

## 2024-11-12 RX ORDER — GABAPENTIN 100 MG/1
100 CAPSULE ORAL 3 TIMES DAILY
Qty: 90 CAPSULE | Refills: 2 | Status: SHIPPED | OUTPATIENT
Start: 2024-11-12 | End: 2024-12-12

## 2024-11-12 NOTE — TELEPHONE ENCOUNTER
PCP: William Levy MD    Last appt: 7/8/2024       Future Appointments   Date Time Provider Department Center   1/10/2025 10:45 AM William Levy MD [facility] DEP       Requested Prescriptions     Pending Prescriptions Disp Refills    gabapentin (NEURONTIN) 100 MG capsule 90 capsule 0     Sig: Take 1 capsule by mouth nightly for 30 days. Max Daily Amount: 100 mg       Prior labs and Blood pressures:  BP Readings from Last 3 Encounters:   10/23/24 (!) 159/77   07/08/24 (!) 144/72   01/08/24 (!) 140/62     Lab Results   Component Value Date/Time     07/08/2024 11:16 AM    K 4.7 07/08/2024 11:16 AM     07/08/2024 11:16 AM    CO2 27 07/08/2024 11:16 AM    BUN 17 07/08/2024 11:16 AM    GFRAA 59 06/24/2022 09:54 AM     No results found for: \"HBA1C\", \"[id number]\"  Lab Results   Component Value Date/Time    CHOL 277 01/08/2024 11:41 AM    HDL 71 01/08/2024 11:41 AM    .4 01/08/2024 11:41 AM    VLDL 23.6 01/08/2024 11:41 AM     No results found for: \"VITD3\"    Lab Results   Component Value Date/Time    TSH 1.39 07/08/2024 11:16 AM

## 2024-12-29 ENCOUNTER — PATIENT MESSAGE (OUTPATIENT)
Age: 88
End: 2024-12-29

## 2024-12-29 DIAGNOSIS — M48.062 SPINAL STENOSIS OF LUMBAR REGION WITH NEUROGENIC CLAUDICATION: ICD-10-CM

## 2024-12-29 DIAGNOSIS — M54.31 RIGHT SIDED SCIATICA: Primary | ICD-10-CM

## 2025-01-02 ENCOUNTER — APPOINTMENT (OUTPATIENT)
Facility: HOSPITAL | Age: 89
End: 2025-01-02
Payer: MEDICARE

## 2025-01-02 ENCOUNTER — HOSPITAL ENCOUNTER (EMERGENCY)
Facility: HOSPITAL | Age: 89
Discharge: HOME | End: 2025-01-02
Attending: EMERGENCY MEDICINE
Payer: MEDICARE

## 2025-01-02 ENCOUNTER — TELEPHONE (OUTPATIENT)
Age: 89
End: 2025-01-02

## 2025-01-02 VITALS
SYSTOLIC BLOOD PRESSURE: 173 MMHG | DIASTOLIC BLOOD PRESSURE: 78 MMHG | RESPIRATION RATE: 18 BRPM | HEART RATE: 78 BPM | HEIGHT: 65 IN | OXYGEN SATURATION: 97 % | TEMPERATURE: 97.7 F | WEIGHT: 151.46 LBS | BODY MASS INDEX: 25.23 KG/M2

## 2025-01-02 DIAGNOSIS — M54.16 ACUTE RIGHT LUMBAR RADICULOPATHY: Primary | ICD-10-CM

## 2025-01-02 DIAGNOSIS — M48.062 SPINAL STENOSIS OF LUMBAR REGION WITH NEUROGENIC CLAUDICATION: ICD-10-CM

## 2025-01-02 LAB
APPEARANCE UR: CLEAR
BACTERIA URNS QL MICRO: ABNORMAL /HPF
BILIRUB UR QL: NEGATIVE
COLOR UR: ABNORMAL
EPITH CASTS URNS QL MICRO: ABNORMAL /LPF
GLUCOSE UR STRIP.AUTO-MCNC: NEGATIVE MG/DL
HGB UR QL STRIP: NEGATIVE
KETONES UR QL STRIP.AUTO: NEGATIVE MG/DL
LEUKOCYTE ESTERASE UR QL STRIP.AUTO: NEGATIVE
NITRITE UR QL STRIP.AUTO: NEGATIVE
PH UR STRIP: 6.5 (ref 5–8)
PROT UR STRIP-MCNC: NEGATIVE MG/DL
RBC #/AREA URNS HPF: ABNORMAL /HPF (ref 0–5)
SP GR UR REFRACTOMETRY: 1.02 (ref 1–1.03)
UROBILINOGEN UR QL STRIP.AUTO: 0.2 EU/DL (ref 0.2–1)
WBC URNS QL MICRO: ABNORMAL /HPF (ref 0–4)

## 2025-01-02 PROCEDURE — 81001 URINALYSIS AUTO W/SCOPE: CPT

## 2025-01-02 PROCEDURE — 6370000000 HC RX 637 (ALT 250 FOR IP): Performed by: EMERGENCY MEDICINE

## 2025-01-02 PROCEDURE — 99284 EMERGENCY DEPT VISIT MOD MDM: CPT

## 2025-01-02 PROCEDURE — 72100 X-RAY EXAM L-S SPINE 2/3 VWS: CPT

## 2025-01-02 RX ORDER — METHOCARBAMOL 750 MG/1
750 TABLET, FILM COATED ORAL EVERY 6 HOURS PRN
Qty: 24 TABLET | Refills: 0 | Status: SHIPPED | OUTPATIENT
Start: 2025-01-02 | End: 2025-01-05

## 2025-01-02 RX ORDER — NAPROXEN 250 MG/1
250 TABLET ORAL ONCE
Status: COMPLETED | OUTPATIENT
Start: 2025-01-02 | End: 2025-01-02

## 2025-01-02 RX ORDER — AMOXICILLIN 250 MG
2 CAPSULE ORAL
COMMUNITY
Start: 2025-01-02

## 2025-01-02 RX ORDER — MELOXICAM 7.5 MG/1
7.5 TABLET ORAL DAILY PRN
Qty: 10 TABLET | Refills: 0 | Status: SHIPPED | OUTPATIENT
Start: 2025-01-02 | End: 2025-01-12

## 2025-01-02 RX ORDER — HYDROCODONE BITARTRATE AND ACETAMINOPHEN 5; 325 MG/1; MG/1
1 TABLET ORAL EVERY 6 HOURS PRN
Qty: 12 TABLET | Refills: 0 | Status: SHIPPED | OUTPATIENT
Start: 2025-01-02 | End: 2025-01-05

## 2025-01-02 RX ORDER — HYDROCODONE BITARTRATE AND ACETAMINOPHEN 5; 325 MG/1; MG/1
1 TABLET ORAL
Status: COMPLETED | OUTPATIENT
Start: 2025-01-02 | End: 2025-01-02

## 2025-01-02 RX ORDER — LIDOCAINE 50 MG/G
1 PATCH TOPICAL DAILY PRN
Qty: 10 PATCH | Refills: 0 | Status: SHIPPED | OUTPATIENT
Start: 2025-01-02 | End: 2025-01-12

## 2025-01-02 RX ORDER — METHOCARBAMOL 750 MG/1
750 TABLET, FILM COATED ORAL ONCE
Status: COMPLETED | OUTPATIENT
Start: 2025-01-02 | End: 2025-01-02

## 2025-01-02 RX ADMIN — NAPROXEN SODIUM 250 MG: 250 TABLET ORAL at 19:13

## 2025-01-02 RX ADMIN — METHOCARBAMOL 750 MG: 750 TABLET ORAL at 19:13

## 2025-01-02 RX ADMIN — HYDROCODONE BITARTRATE AND ACETAMINOPHEN 1 TABLET: 5; 325 TABLET ORAL at 19:13

## 2025-01-02 ASSESSMENT — PAIN DESCRIPTION - LOCATION: LOCATION: KNEE;BACK

## 2025-01-02 ASSESSMENT — LIFESTYLE VARIABLES
HOW OFTEN DO YOU HAVE A DRINK CONTAINING ALCOHOL: NEVER
HOW MANY STANDARD DRINKS CONTAINING ALCOHOL DO YOU HAVE ON A TYPICAL DAY: PATIENT DOES NOT DRINK

## 2025-01-02 ASSESSMENT — PAIN SCALES - GENERAL
PAINLEVEL_OUTOF10: 8
PAINLEVEL_OUTOF10: 2

## 2025-01-02 ASSESSMENT — PAIN DESCRIPTION - DESCRIPTORS: DESCRIPTORS: DISCOMFORT;PRESSURE

## 2025-01-02 ASSESSMENT — PAIN - FUNCTIONAL ASSESSMENT: PAIN_FUNCTIONAL_ASSESSMENT: 0-10

## 2025-01-02 ASSESSMENT — PAIN DESCRIPTION - ORIENTATION: ORIENTATION: RIGHT

## 2025-01-02 NOTE — ED TRIAGE NOTES
Pt to ED with cc of 5 days of back pain radiating to R leg/knee. Pt states pain is so bad she has not been able to sleep. Ambulates with walker.

## 2025-01-02 NOTE — TELEPHONE ENCOUNTER
Patient's daughter stated mother was referred to Dr. Gong can not get mom in for 8 days. Stated mom need to see someone sooner in a lot of pain mostly at night can not sleep. Daughter would like for provider to give her a call as soon as possible.  Wants to know should she take mother to emergency room.    Requesting a call back    Best call back #[phone]

## 2025-01-03 NOTE — ED PROVIDER NOTES
[facility]  EMERGENCY DEPARTMENT ENCOUNTER      Pt Name: Radhika Vasquez  MRN: [id number]  Birthdate 5/2/1929  Date of evaluation: 1/2/2025  Provider: Ronnie Dotson MD    CHIEF COMPLAINT       Chief Complaint   Patient presents with    Back Pain         HISTORY OF PRESENT ILLNESS   (Location/Symptom, Timing/Onset, Context/Setting, Quality, Duration, Modifying Factors, Severity)  Note limiting factors.   HPI      Review of External Medical Records:     Nursing Notes were reviewed.    REVIEW OF SYSTEMS    (2-9 systems for level 4, 10 or more for level 5)     Review of Systems    Except as noted above the remainder of the review of systems was reviewed and negative.       PAST MEDICAL HISTORY     Past Medical History:   Diagnosis Date    Acquired hypothyroidism 11/28/2017    Allergic rhinoconjunctivitis, seasonal and perennial 9/5/2019    Arthritis     Balance problem 11/28/2017    Excessive drinking alcohol 11/29/2017    History of hormone replacement therapy 1987    stopped    HTN, goal below 150/90 11/28/2017    Iron deficiency anemia 12/12/2019    Lichen planus     Oral    Neuropathic postherpetic trigeminal neuralgia 11/28/2017    OAB (overactive bladder) 11/29/2017    Osteopenia of multiple sites 2/2/2018    dexa 1/18, started fosamax    Spinal stenosis of lumbar region with neurogenic claudication 2/6/2019         SURGICAL HISTORY       Past Surgical History:   Procedure Laterality Date    BREAST BIOPSY Left 2014    Stereo    COLONOSCOPY  2012    CYST INCISION AND DRAINAGE Right 04/16/2018    benign    IR GUIDED INJ LUMB/SAC TRANSFORAMINAL EPI SINGLE LEVEL  11/17/2020    IR GUIDED INJ LUMB/SAC TRANSFORAMINAL EPI SINGLE LEVEL  2/20/2019    IR GUIDED INJ LUMB/SAC TRANSFORAMINAL EPI SINGLE LEVEL  6/21/2019    IR GUIDED INJ LUMB/SAC TRANSFORAMINAL EPI SINGLE LEVEL  12/20/2019    IR KYPHOPLASTY LUMBAR 1 VERTEBRAL BODY  7/19/2023    IR KYPHOPLASTY LUMBAR FIRST LEVEL 7/19/2023 [facility] CATH/EP/IR LAB  Scoliosis with significant multilevel spondylosis.                  Electronically signed by SANTA IRIZARRY           LABS:  Labs Reviewed   URINALYSIS WITH MICROSCOPIC - Abnormal; Notable for the following components:       Result Value    BACTERIA, URINE 1+ (*)     All other components within normal limits       All other labs were within normal range or not returned as of this dictation.    EMERGENCY DEPARTMENT COURSE and DIFFERENTIAL DIAGNOSIS/MDM:   Vitals:    Vitals:    01/02/25 1607 01/02/25 1905   BP: (!) 160/72 (!) 173/78   Pulse: (!) 109 78   Resp: 18 18   Temp: 97.7 °F (36.5 °C)    TempSrc: Oral    SpO2: 96% 97%   Weight: 68.7 kg (151 lb 7.3 oz)    Height: 1.651 m (5' 5\")            Medical Decision Making  The patient presents with acute onset of back pain without injury, history of spinal stenosis. Clinically, this patient can be ruled out for serious pathology given there is a completely normal neurological exam, no history of IV drug use, and no history of bowel or bladder incontinence, no perianal numbness/tingling, no constipation or urinary retention.     Lortab, Naproxen, and Robaxin given for pain; once the patient's pain was adequately controlled, the patient was able to ambulate and be discharged in stable condition with anticipatory guidance provided.        Amount and/or Complexity of Data Reviewed  Independent Historian: caregiver    Risk  OTC drugs.  Prescription drug management.        ED Course as of 01/13/25 2107   Thu Jan 02, 2025   1832 XR independently viewed, scoliosis and prior kyphoplasty but no evidence of acute fx/dislocation per my read   [RS]      ED Course User Index  [RS] Ronnie Dotson MD           CONSULTS:  None    PROCEDURES:  Unless otherwise noted below, none     Procedures    MEDS:  Medications   HYDROcodone-acetaminophen (NORCO) 5-325 MG per tablet 1 tablet (1 tablet Oral Given 1/2/25 1913)   methocarbamol (ROBAXIN) tablet 750 mg (750 mg Oral Given 1/2/25 1913)

## 2025-01-10 ENCOUNTER — OFFICE VISIT (OUTPATIENT)
Age: 89
End: 2025-01-10
Payer: MEDICARE

## 2025-01-10 VITALS
OXYGEN SATURATION: 97 % | SYSTOLIC BLOOD PRESSURE: 132 MMHG | RESPIRATION RATE: 18 BRPM | HEART RATE: 75 BPM | TEMPERATURE: 97.5 F | BODY MASS INDEX: 24.83 KG/M2 | WEIGHT: 149 LBS | HEIGHT: 65 IN | DIASTOLIC BLOOD PRESSURE: 68 MMHG

## 2025-01-10 DIAGNOSIS — E03.9 ACQUIRED HYPOTHYROIDISM: Primary | ICD-10-CM

## 2025-01-10 DIAGNOSIS — R05.3 CHRONIC COUGH: ICD-10-CM

## 2025-01-10 DIAGNOSIS — B02.22 POSTHERPETIC TRIGEMINAL NEURALGIA: ICD-10-CM

## 2025-01-10 DIAGNOSIS — E78.5 HYPERLIPIDEMIA, UNSPECIFIED HYPERLIPIDEMIA TYPE: ICD-10-CM

## 2025-01-10 DIAGNOSIS — M54.41 ACUTE RIGHT-SIDED LOW BACK PAIN WITH RIGHT-SIDED SCIATICA: ICD-10-CM

## 2025-01-10 DIAGNOSIS — R73.09 ELEVATED GLUCOSE: ICD-10-CM

## 2025-01-10 DIAGNOSIS — N18.31 STAGE 3A CHRONIC KIDNEY DISEASE (HCC): ICD-10-CM

## 2025-01-10 LAB
ALBUMIN SERPL-MCNC: 3.5 G/DL (ref 3.5–5)
ALBUMIN/GLOB SERPL: 1.2 (ref 1.1–2.2)
ALP SERPL-CCNC: 68 U/L (ref 45–117)
ALT SERPL-CCNC: 16 U/L (ref 12–78)
ANION GAP SERPL CALC-SCNC: 5 MMOL/L (ref 2–12)
AST SERPL-CCNC: 16 U/L (ref 15–37)
BILIRUB SERPL-MCNC: 0.5 MG/DL (ref 0.2–1)
BUN SERPL-MCNC: 17 MG/DL (ref 6–20)
BUN/CREAT SERPL: 16 (ref 12–20)
CALCIUM SERPL-MCNC: 9.5 MG/DL (ref 8.5–10.1)
CHLORIDE SERPL-SCNC: 108 MMOL/L (ref 97–108)
CHOLEST SERPL-MCNC: 232 MG/DL
CO2 SERPL-SCNC: 26 MMOL/L (ref 21–32)
CREAT SERPL-MCNC: 1.04 MG/DL (ref 0.55–1.02)
ERYTHROCYTE [DISTWIDTH] IN BLOOD BY AUTOMATED COUNT: 14.9 % (ref 11.5–14.5)
EST. AVERAGE GLUCOSE BLD GHB EST-MCNC: 97 MG/DL
GLOBULIN SER CALC-MCNC: 2.9 G/DL (ref 2–4)
GLUCOSE SERPL-MCNC: 82 MG/DL (ref 65–100)
HBA1C MFR BLD: 5 % (ref 4–5.6)
HCT VFR BLD AUTO: 36.6 % (ref 35–47)
HDLC SERPL-MCNC: 82 MG/DL
HDLC SERPL: 2.8 (ref 0–5)
HGB BLD-MCNC: 11.8 G/DL (ref 11.5–16)
LDLC SERPL CALC-MCNC: 136 MG/DL (ref 0–100)
MCH RBC QN AUTO: 29.1 PG (ref 26–34)
MCHC RBC AUTO-ENTMCNC: 32.2 G/DL (ref 30–36.5)
MCV RBC AUTO: 90.4 FL (ref 80–99)
NRBC # BLD: 0 K/UL (ref 0–0.01)
NRBC BLD-RTO: 0 PER 100 WBC
PLATELET # BLD AUTO: 311 K/UL (ref 150–400)
PMV BLD AUTO: 10.2 FL (ref 8.9–12.9)
POTASSIUM SERPL-SCNC: 4.7 MMOL/L (ref 3.5–5.1)
PROT SERPL-MCNC: 6.4 G/DL (ref 6.4–8.2)
RBC # BLD AUTO: 4.05 M/UL (ref 3.8–5.2)
SODIUM SERPL-SCNC: 139 MMOL/L (ref 136–145)
T4 FREE SERPL-MCNC: 1.2 NG/DL (ref 0.8–1.5)
TRIGL SERPL-MCNC: 70 MG/DL
TSH SERPL DL<=0.05 MIU/L-ACNC: 2.31 UIU/ML (ref 0.36–3.74)
VLDLC SERPL CALC-MCNC: 14 MG/DL
WBC # BLD AUTO: 5.5 K/UL (ref 3.6–11)

## 2025-01-10 PROCEDURE — 99214 OFFICE O/P EST MOD 30 MIN: CPT | Performed by: FAMILY MEDICINE

## 2025-01-10 SDOH — ECONOMIC STABILITY: FOOD INSECURITY: WITHIN THE PAST 12 MONTHS, YOU WORRIED THAT YOUR FOOD WOULD RUN OUT BEFORE YOU GOT MONEY TO BUY MORE.: NEVER TRUE

## 2025-01-10 SDOH — ECONOMIC STABILITY: FOOD INSECURITY: WITHIN THE PAST 12 MONTHS, THE FOOD YOU BOUGHT JUST DIDN'T LAST AND YOU DIDN'T HAVE MONEY TO GET MORE.: NEVER TRUE

## 2025-01-10 ASSESSMENT — PATIENT HEALTH QUESTIONNAIRE - PHQ9
SUM OF ALL RESPONSES TO PHQ QUESTIONS 1-9: 0
1. LITTLE INTEREST OR PLEASURE IN DOING THINGS: NOT AT ALL
SUM OF ALL RESPONSES TO PHQ QUESTIONS 1-9: 0
SUM OF ALL RESPONSES TO PHQ9 QUESTIONS 1 & 2: 0
2. FEELING DOWN, DEPRESSED OR HOPELESS: NOT AT ALL

## 2025-01-10 NOTE — PROGRESS NOTES
Chief Complaint   Patient presents with    Follow-up     \"Have you been to the ER, urgent care clinic since your last visit?  Hospitalized since your last visit?\"    NO    “Have you seen or consulted any other health care providers outside of [facility] since your last visit?”    NO       Financial Resource Strain: Low Risk  (7/8/2024)    Overall Financial Resource Strain (CARDIA)     Difficulty of Paying Living Expenses: Not hard at all      Food Insecurity: No Food Insecurity (1/10/2025)    Hunger Vital Sign     Worried About Running Out of Food in the Last Year: Never true     Ran Out of Food in the Last Year: Never true            1/10/2025    10:50 AM   PHQ-9    Little interest or pleasure in doing things 0   Feeling down, depressed, or hopeless 0   PHQ-2 Score 0   PHQ-9 Total Score 0       Health Maintenance Due   Topic Date Due    Respiratory Syncytial Virus (RSV) Pregnant or age 60 yrs+ (1 - 1-dose 75+ series) Never done    Flu vaccine (1) 08/01/2024    COVID-19 Vaccine (8 - 2023-24 season) 12/12/2024

## 2025-01-10 NOTE — PROGRESS NOTES
Patient Name: Radhika Vasquez   MRN: [id number]    ASSESSMENT AND PLAN  Radhika Vasquez is a 89 y.o. female who presents today for:    1. Acquired hypothyroidism  Stable, continue current treatment, pending review of labs.  - TSH + Free T4 Panel; Future  - TSH + Free T4 Panel    2. Hyperlipidemia, unspecified hyperlipidemia type  Will calculate ASCVD risk score pending labs.  - CBC; Future  - Comprehensive Metabolic Panel; Future  - Lipid Panel; Future  - Lipid Panel  - Comprehensive Metabolic Panel  - CBC    3. Elevated glucose  - Comprehensive Metabolic Panel; Future  - Hemoglobin A1C; Future  - Hemoglobin A1C  - Comprehensive Metabolic Panel    4. Stage 3a chronic kidney disease (HCC)  Stable, continue current treatment, pending review of labs.    5. Acute right-sided low back pain with right-sided sciatica  Patient to follow-up with Dr. Gong.  If the higher dose of gabapentin seems to help with her, she will let me know and I will continue her prescription.  If it does not provide much benefit, would recommend for her to stick with her usual gabapentin dosing.    6. Postherpetic trigeminal neuralgia  Stable, continue current treatment.    7. Chronic cough  Discussed that lisinopril could cause cough as a side effect.  She says her symptoms are fairly minimal and does not want to change her blood pressure medication at this time.    No orders of the defined types were placed in this encounter.       Medications Discontinued During This Encounter   Medication Reason    lisinopril (PRINIVIL;ZESTRIL) 10 MG tablet ERROR    meloxicam (MOBIC) 7.5 MG tablet ERROR       Return in about 6 months (around 7/10/2025) for AWV.    SUBJECTIVE  Radhika Vasquez is a 89 y.o. female who presents with the following:     Patient recently went to the ER for acute on chronic low back pain with worsening right leg pain.  X-ray showed no new findings cervical she does have a history of scoliosis and a chronic compression deformity.

## 2025-01-21 ENCOUNTER — HOSPITAL ENCOUNTER (OUTPATIENT)
Facility: HOSPITAL | Age: 89
Discharge: HOME | End: 2025-01-24
Attending: PHYSICAL MEDICINE & REHABILITATION
Payer: MEDICARE

## 2025-01-21 ENCOUNTER — TRANSCRIBE ORDERS (OUTPATIENT)
Facility: HOSPITAL | Age: 89
End: 2025-01-21

## 2025-01-21 DIAGNOSIS — M54.17 LUMBOSACRAL RADICULITIS: ICD-10-CM

## 2025-01-21 DIAGNOSIS — M54.16 LUMBAR RADICULOPATHY: ICD-10-CM

## 2025-01-21 PROCEDURE — 72110 X-RAY EXAM L-2 SPINE 4/>VWS: CPT

## 2025-01-21 PROCEDURE — 72148 MRI LUMBAR SPINE W/O DYE: CPT

## 2025-01-29 ENCOUNTER — PATIENT MESSAGE (OUTPATIENT)
Age: 89
End: 2025-01-29

## 2025-02-06 ENCOUNTER — PATIENT MESSAGE (OUTPATIENT)
Age: 89
End: 2025-02-06

## 2025-02-06 DIAGNOSIS — E03.9 ACQUIRED HYPOTHYROIDISM: ICD-10-CM

## 2025-02-06 RX ORDER — LEVOTHYROXINE SODIUM 50 UG/1
50 TABLET ORAL DAILY
Qty: 90 TABLET | Refills: 3 | Status: SHIPPED | OUTPATIENT
Start: 2025-02-06

## 2025-02-20 RX ORDER — OMEPRAZOLE 20 MG/1
20 CAPSULE, DELAYED RELEASE ORAL
Qty: 30 CAPSULE | Refills: 0 | Status: SHIPPED | OUTPATIENT
Start: 2025-02-20

## 2025-02-20 NOTE — TELEPHONE ENCOUNTER
PCP: William Levy MD    Last appt: 1/10/2025       Future Appointments   Date Time Provider Department Center   7/7/2025 10:45 AM William Levy MD [facility] DEP       Requested Prescriptions     Pending Prescriptions Disp Refills    omeprazole (PRILOSEC) 20 MG delayed release capsule [Pharmacy Med Name: OMEPRAZOLE DR 20 MG CAPSULE] 90 capsule 1     Sig: TAKE 1 CAPSULE BY MOUTH EVERY DAY IN THE MORNING BEFORE BREAKFAST       Prior labs and Blood pressures:  BP Readings from Last 3 Encounters:   01/10/25 132/68   01/02/25 (!) 173/78   10/23/24 (!) 159/77     Lab Results   Component Value Date/Time     01/10/2025 11:27 AM    K 4.7 01/10/2025 11:27 AM     01/10/2025 11:27 AM    CO2 26 01/10/2025 11:27 AM    BUN 17 01/10/2025 11:27 AM    GFRAA 59 06/24/2022 09:54 AM     No results found for: \"HBA1C\", \"[id number]\"  Lab Results   Component Value Date/Time    CHOL 232 01/10/2025 11:27 AM    HDL 82 01/10/2025 11:27 AM     01/10/2025 11:27 AM    .4 01/08/2024 11:41 AM    VLDL 14 01/10/2025 11:27 AM     No results found for: \"VITD3\"    Lab Results   Component Value Date/Time    TSH 2.31 01/10/2025 11:27 AM

## 2025-03-11 RX ORDER — OMEPRAZOLE 20 MG/1
20 CAPSULE, DELAYED RELEASE ORAL
Qty: 90 CAPSULE | Refills: 1 | Status: SHIPPED | OUTPATIENT
Start: 2025-03-11

## 2025-03-11 NOTE — TELEPHONE ENCOUNTER
PCP: William Levy MD    Last appt: 1/10/2025       Future Appointments   Date Time Provider Department Center   7/7/2025 10:45 AM William Levy MD [facility]       Requested Prescriptions     Pending Prescriptions Disp Refills    omeprazole (PRILOSEC) 20 MG delayed release capsule [Pharmacy Med Name: OMEPRAZOLE DR 20 MG CAPSULE] 90 capsule 1     Sig: TAKE 1 CAPSULE BY MOUTH EVERY DAY IN THE MORNING BEFORE BREAKFAST       Prior labs and Blood pressures:  BP Readings from Last 3 Encounters:   01/10/25 132/68   01/02/25 (!) 173/78   10/23/24 (!) 159/77     Lab Results   Component Value Date/Time     01/10/2025 11:27 AM    K 4.7 01/10/2025 11:27 AM     01/10/2025 11:27 AM    CO2 26 01/10/2025 11:27 AM    BUN 17 01/10/2025 11:27 AM    GFRAA 59 06/24/2022 09:54 AM     No results found for: \"HBA1C\", \"[id number]\"  Lab Results   Component Value Date/Time    CHOL 232 01/10/2025 11:27 AM    HDL 82 01/10/2025 11:27 AM     01/10/2025 11:27 AM    .4 01/08/2024 11:41 AM    VLDL 14 01/10/2025 11:27 AM     No results found for: \"VITD3\"    Lab Results   Component Value Date/Time    TSH 2.31 01/10/2025 11:27 AM

## 2025-05-02 ENCOUNTER — PATIENT MESSAGE (OUTPATIENT)
Age: 89
End: 2025-05-02

## 2025-05-02 RX ORDER — LISINOPRIL 10 MG/1
10 TABLET ORAL DAILY
Qty: 90 TABLET | Refills: 3 | Status: SHIPPED | OUTPATIENT
Start: 2025-05-02

## 2025-05-02 RX ORDER — OXYBUTYNIN CHLORIDE 5 MG/1
5 TABLET ORAL DAILY PRN
Qty: 30 TABLET | Refills: 0 | Status: SHIPPED | OUTPATIENT
Start: 2025-05-02

## 2025-05-02 NOTE — TELEPHONE ENCOUNTER
PCP: William Levy MD    Last appt: 1/10/2025       Future Appointments   Date Time Provider Department Center   7/7/2025 10:45 AM William Levy MD [facility] DEP       Requested Prescriptions     Pending Prescriptions Disp Refills    lisinopril (PRINIVIL;ZESTRIL) 10 MG tablet 90 tablet 3     Sig: Take 1 tablet by mouth daily    oxyBUTYnin (DITROPAN) 5 MG tablet 30 tablet 0     Sig: Take 1 tablet by mouth daily as needed (bladder control)       Prior labs and Blood pressures:  BP Readings from Last 3 Encounters:   01/10/25 132/68   01/02/25 (!) 173/78   10/23/24 (!) 159/77     Lab Results   Component Value Date/Time     01/10/2025 11:27 AM    K 4.7 01/10/2025 11:27 AM     01/10/2025 11:27 AM    CO2 26 01/10/2025 11:27 AM    BUN 17 01/10/2025 11:27 AM    GFRAA 59 06/24/2022 09:54 AM     No results found for: \"HBA1C\", \"[id number]\"  Lab Results   Component Value Date/Time    CHOL 232 01/10/2025 11:27 AM    HDL 82 01/10/2025 11:27 AM     01/10/2025 11:27 AM    .4 01/08/2024 11:41 AM    VLDL 14 01/10/2025 11:27 AM     No results found for: \"VITD3\"    Lab Results   Component Value Date/Time    TSH 2.31 01/10/2025 11:27 AM

## 2025-07-18 ENCOUNTER — OFFICE VISIT (OUTPATIENT)
Age: 89
End: 2025-07-18
Payer: MEDICARE

## 2025-07-18 VITALS
WEIGHT: 142.6 LBS | DIASTOLIC BLOOD PRESSURE: 70 MMHG | BODY MASS INDEX: 23.76 KG/M2 | HEART RATE: 77 BPM | HEIGHT: 65 IN | OXYGEN SATURATION: 97 % | RESPIRATION RATE: 16 BRPM | SYSTOLIC BLOOD PRESSURE: 130 MMHG | TEMPERATURE: 97.3 F

## 2025-07-18 DIAGNOSIS — R01.1 HEART MURMUR: ICD-10-CM

## 2025-07-18 DIAGNOSIS — Z00.00 ENCOUNTER FOR MEDICARE ANNUAL WELLNESS EXAM: Primary | ICD-10-CM

## 2025-07-18 DIAGNOSIS — Z13.6 SCREENING FOR CARDIOVASCULAR CONDITION: ICD-10-CM

## 2025-07-18 DIAGNOSIS — M48.062 SPINAL STENOSIS OF LUMBAR REGION WITH NEUROGENIC CLAUDICATION: ICD-10-CM

## 2025-07-18 DIAGNOSIS — R53.1 DECREASED STRENGTH: ICD-10-CM

## 2025-07-18 DIAGNOSIS — Z13.1 SCREENING FOR DIABETES MELLITUS: ICD-10-CM

## 2025-07-18 DIAGNOSIS — E78.5 HYPERLIPIDEMIA, UNSPECIFIED HYPERLIPIDEMIA TYPE: ICD-10-CM

## 2025-07-18 DIAGNOSIS — F10.10 EXCESSIVE DRINKING OF ALCOHOL: ICD-10-CM

## 2025-07-18 DIAGNOSIS — R68.89 COLD INTOLERANCE: ICD-10-CM

## 2025-07-18 DIAGNOSIS — Z13.89 ENCOUNTER FOR SCREENING FOR OTHER DISORDER: ICD-10-CM

## 2025-07-18 DIAGNOSIS — G47.00 INSOMNIA, UNSPECIFIED TYPE: ICD-10-CM

## 2025-07-18 DIAGNOSIS — K21.9 GASTROESOPHAGEAL REFLUX DISEASE, UNSPECIFIED WHETHER ESOPHAGITIS PRESENT: ICD-10-CM

## 2025-07-18 DIAGNOSIS — E03.9 ACQUIRED HYPOTHYROIDISM: ICD-10-CM

## 2025-07-18 DIAGNOSIS — R26.81 GAIT INSTABILITY: ICD-10-CM

## 2025-07-18 DIAGNOSIS — N32.81 OAB (OVERACTIVE BLADDER): ICD-10-CM

## 2025-07-18 PROCEDURE — 99214 OFFICE O/P EST MOD 30 MIN: CPT | Performed by: FAMILY MEDICINE

## 2025-07-18 PROCEDURE — 1159F MED LIST DOCD IN RCRD: CPT | Performed by: FAMILY MEDICINE

## 2025-07-18 PROCEDURE — 1090F PRES/ABSN URINE INCON ASSESS: CPT | Performed by: FAMILY MEDICINE

## 2025-07-18 PROCEDURE — G8427 DOCREV CUR MEDS BY ELIG CLIN: HCPCS | Performed by: FAMILY MEDICINE

## 2025-07-18 PROCEDURE — G2211 COMPLEX E/M VISIT ADD ON: HCPCS | Performed by: FAMILY MEDICINE

## 2025-07-18 PROCEDURE — G0443 BRIEF ALCOHOL MISUSE COUNSEL: HCPCS | Performed by: FAMILY MEDICINE

## 2025-07-18 PROCEDURE — 1126F AMNT PAIN NOTED NONE PRSNT: CPT | Performed by: FAMILY MEDICINE

## 2025-07-18 PROCEDURE — 1123F ACP DISCUSS/DSCN MKR DOCD: CPT | Performed by: FAMILY MEDICINE

## 2025-07-18 PROCEDURE — G8420 CALC BMI NORM PARAMETERS: HCPCS | Performed by: FAMILY MEDICINE

## 2025-07-18 PROCEDURE — 1036F TOBACCO NON-USER: CPT | Performed by: FAMILY MEDICINE

## 2025-07-18 PROCEDURE — G0439 PPPS, SUBSEQ VISIT: HCPCS | Performed by: FAMILY MEDICINE

## 2025-07-18 PROCEDURE — G0446 INTENS BEHAVE THER CARDIO DX: HCPCS | Performed by: FAMILY MEDICINE

## 2025-07-18 RX ORDER — OXYBUTYNIN CHLORIDE 5 MG/1
5 TABLET, EXTENDED RELEASE ORAL DAILY
Qty: 90 TABLET | Refills: 3 | Status: SHIPPED | OUTPATIENT
Start: 2025-07-18

## 2025-07-18 RX ORDER — OMEPRAZOLE 20 MG/1
20 CAPSULE, DELAYED RELEASE ORAL DAILY PRN
Qty: 90 CAPSULE | Refills: 0 | Status: SHIPPED | OUTPATIENT
Start: 2025-07-18

## 2025-07-18 SDOH — ECONOMIC STABILITY: FOOD INSECURITY: WITHIN THE PAST 12 MONTHS, YOU WORRIED THAT YOUR FOOD WOULD RUN OUT BEFORE YOU GOT MONEY TO BUY MORE.: NEVER TRUE

## 2025-07-18 SDOH — ECONOMIC STABILITY: FOOD INSECURITY: WITHIN THE PAST 12 MONTHS, THE FOOD YOU BOUGHT JUST DIDN'T LAST AND YOU DIDN'T HAVE MONEY TO GET MORE.: NEVER TRUE

## 2025-07-18 ASSESSMENT — LIFESTYLE VARIABLES
HOW OFTEN DURING THE LAST YEAR HAVE YOU FAILED TO DO WHAT WAS NORMALLY EXPECTED FROM YOU BECAUSE OF DRINKING: NEVER
HOW MANY STANDARD DRINKS CONTAINING ALCOHOL DO YOU HAVE ON A TYPICAL DAY: 1 OR 2
HAVE YOU OR SOMEONE ELSE BEEN INJURED AS A RESULT OF YOUR DRINKING: NO
HOW OFTEN DURING THE LAST YEAR HAVE YOU BEEN UNABLE TO REMEMBER WHAT HAPPENED THE NIGHT BEFORE BECAUSE YOU HAD BEEN DRINKING: NEVER
HAS A RELATIVE, FRIEND, DOCTOR, OR ANOTHER HEALTH PROFESSIONAL EXPRESSED CONCERN ABOUT YOUR DRINKING OR SUGGESTED YOU CUT DOWN: NO
HOW OFTEN DURING THE LAST YEAR HAVE YOU HAD A FEELING OF GUILT OR REMORSE AFTER DRINKING: NEVER
HOW OFTEN DO YOU HAVE A DRINK CONTAINING ALCOHOL: 4 OR MORE TIMES A WEEK
HOW OFTEN DURING THE LAST YEAR HAVE YOU NEEDED AN ALCOHOLIC DRINK FIRST THING IN THE MORNING TO GET YOURSELF GOING AFTER A NIGHT OF HEAVY DRINKING: NEVER
HOW OFTEN DURING THE LAST YEAR HAVE YOU FOUND THAT YOU WERE NOT ABLE TO STOP DRINKING ONCE YOU HAD STARTED: NEVER

## 2025-07-18 ASSESSMENT — PATIENT HEALTH QUESTIONNAIRE - PHQ9
SUM OF ALL RESPONSES TO PHQ QUESTIONS 1-9: 0
1. LITTLE INTEREST OR PLEASURE IN DOING THINGS: NOT AT ALL
2. FEELING DOWN, DEPRESSED OR HOPELESS: NOT AT ALL

## 2025-07-18 NOTE — ASSESSMENT & PLAN NOTE
Chronic, not at goal (unstable), Recommend starting daily nightly oxybutynin and see if this improves her sleep quality.    Orders:    oxyBUTYnin (DITROPAN XL) 5 MG extended release tablet; Take 1 tablet by mouth daily

## 2025-07-18 NOTE — ASSESSMENT & PLAN NOTE
Monitored by specialist- no acute findings meriting change in the plan.  Defer gabapentin management to her pain management doctor.  Will initiate home health.    Orders:    [facility] by Kalen Ortiz

## 2025-07-18 NOTE — PROGRESS NOTES
Chief Complaint   Patient presents with    Medicare AWV     \"Have you been to the ER, urgent care clinic since your last visit?  Hospitalized since your last visit?\"    NO    “Have you seen or consulted any other health care providers outside of [facility] since your last visit?”    NO      Financial Resource Strain: Low Risk  (7/8/2024)    Overall Financial Resource Strain (CARDIA)     Difficulty of Paying Living Expenses: Not hard at all      Food Insecurity: No Food Insecurity (7/18/2025)    Hunger Vital Sign     Worried About Running Out of Food in the Last Year: Never true     Ran Out of Food in the Last Year: Never true            7/18/2025    10:47 AM   PHQ-9    Little interest or pleasure in doing things 0   Feeling down, depressed, or hopeless 0   PHQ-2 Score 0   PHQ-9 Total Score 0       Health Maintenance Due   Topic Date Due    Respiratory Syncytial Virus (RSV) Pregnant or age 60 yrs+ (1 - 1-dose 75+ series) Never done    COVID-19 Vaccine (9 - 2024-25 season) 12/12/2024    Annual Wellness Visit (Medicare)  07/09/2025             
AND DIZZINESS     Neosporin Original [Neomycin-Bacitracin Zn-Polymyx] Other (See Comments)    Pregabalin Other (See Comments)    Sulfa Antibiotics Other (See Comments)    Sulfur     Voltaren [Diclofenac]     Benzalkonium Chloride Rash           OBJECTIVE    Visit Vitals  BP (!) 144/74 (BP Site: Right Upper Arm, Patient Position: Sitting, BP Cuff Size: Medium Adult)   Pulse 77   Temp 97.3 °F (36.3 °C) (Temporal)   Resp 16   Ht 1.651 m (5' 5\")   Wt 64.7 kg (142 lb 9.6 oz)   SpO2 97%   BMI 23.73 kg/m²       Physical Exam  Vitals and nursing note reviewed.   Constitutional:       Appearance: Normal appearance.   HENT:      Head: Normocephalic and atraumatic.   Eyes:      Extraocular Movements: Extraocular movements intact.      Conjunctiva/sclera: Conjunctivae normal.      Pupils: Pupils are equal, round, and reactive to light.   Cardiovascular:      Rate and Rhythm: Normal rate and regular rhythm.      Pulses: Normal pulses.      Heart sounds: Murmur heard.      Systolic murmur is present with a grade of 2/6.      No friction rub. No gallop.   Pulmonary:      Effort: Pulmonary effort is normal. No respiratory distress.      Breath sounds: Normal breath sounds. No stridor. No wheezing, rhonchi or rales.   Musculoskeletal:      Cervical back: Normal range of motion.   Skin:     General: Skin is warm.      Findings: No rash.   Neurological:      General: No focal deficit present.      Mental Status: She is alert and oriented to person, place, and time. Mental status is at baseline.         Treatment risks, benefits, interactions, and alternatives discussed with patient. Advised patient to call back or return to office if symptoms worsen, change, or persist. If patient cannot reach us or should anything more urgent arise, patient should proceed directly to the nearest emergency department. Discussed expected course, resolution, and complications of diagnosis in detail with patient. Patient expressed understanding with the 
Azithromycin     Bacitracin-Polymyxin B     Cedar Leaf Oil Other (See Comments)    Iron Other (See Comments)     GI UPSET AND DIZZINESS     Neosporin Original [Neomycin-Bacitracin Zn-Polymyx] Other (See Comments)    Pregabalin Other (See Comments)    Sulfa Antibiotics Other (See Comments)    Sulfur     Voltaren [Diclofenac]     Benzalkonium Chloride Rash     Prior to Visit Medications    Medication Sig Taking? Authorizing Provider   omeprazole (PRILOSEC) 20 MG delayed release capsule Take 1 capsule by mouth daily as needed (GERD) Yes William Levy MD   oxyBUTYnin (DITROPAN XL) 5 MG extended release tablet Take 1 tablet by mouth daily Yes William Levy MD   lisinopril (PRINIVIL;ZESTRIL) 10 MG tablet Take 1 tablet by mouth daily Yes William Levy MD   oxyBUTYnin (DITROPAN) 5 MG tablet Take 1 tablet by mouth daily as needed (bladder control) Yes William Levy MD   levothyroxine (SYNTHROID) 50 MCG tablet Take 1 tablet by mouth daily Yes William Levy MD   senna-docusate (SENOKOT S) 8.6-50 MG per tablet Take 2 tablets by mouth nightly as needed for Constipation Yes Ronnie Dotson MD   gabapentin (NEURONTIN) 100 MG capsule Take 1 capsule by mouth 3 times daily for 30 days. Max Daily Amount: 300 mg  Patient taking differently: Take 1 capsule by mouth 2 times daily. 1 tablet at 4 PM and 3 tablets at 10 PM. Yes William Levy MD   fluocinonide (LIDEX) 0.05 % cream Apply topically 2 times daily as needed (eczema) Yes William Levy MD       [facility] (Including outside providers/suppliers regularly involved in providing care):   Patient Care Team:  William Levy MD as PCP - General  William Levy MD as PCP - Empaneled Provider  Bj Ray MD (Pain Management)  Bj Ray MD (Pain Management)     Recommendations for Preventive Services Due: see orders and patient instructions/AVS.  Recommended screening schedule for the next 5-10 years is provided to the patient in

## 2025-07-19 LAB
ALBUMIN SERPL-MCNC: 3.9 G/DL (ref 3.5–5)
ALBUMIN/GLOB SERPL: 1.3 (ref 1.1–2.2)
ALP SERPL-CCNC: 85 U/L (ref 45–117)
ALT SERPL-CCNC: 16 U/L (ref 12–78)
ANION GAP SERPL CALC-SCNC: 6 MMOL/L (ref 2–12)
AST SERPL-CCNC: 17 U/L (ref 15–37)
BILIRUB SERPL-MCNC: 0.6 MG/DL (ref 0.2–1)
BUN SERPL-MCNC: 16 MG/DL (ref 6–20)
BUN/CREAT SERPL: 13 (ref 12–20)
CALCIUM SERPL-MCNC: 9.7 MG/DL (ref 8.5–10.1)
CHLORIDE SERPL-SCNC: 104 MMOL/L (ref 97–108)
CHOLEST SERPL-MCNC: 224 MG/DL
CO2 SERPL-SCNC: 25 MMOL/L (ref 21–32)
CREAT SERPL-MCNC: 1.21 MG/DL (ref 0.55–1.02)
ERYTHROCYTE [DISTWIDTH] IN BLOOD BY AUTOMATED COUNT: 17.4 % (ref 11.5–14.5)
EST. AVERAGE GLUCOSE BLD GHB EST-MCNC: 103 MG/DL
GLOBULIN SER CALC-MCNC: 3.1 G/DL (ref 2–4)
GLUCOSE SERPL-MCNC: 98 MG/DL (ref 65–100)
HBA1C MFR BLD: 5.2 % (ref 4–5.6)
HCT VFR BLD AUTO: 34.3 % (ref 35–47)
HDLC SERPL-MCNC: 83 MG/DL
HDLC SERPL: 2.7 (ref 0–5)
HGB BLD-MCNC: 10.1 G/DL (ref 11.5–16)
LDLC SERPL CALC-MCNC: 126.6 MG/DL (ref 0–100)
MCH RBC QN AUTO: 23.1 PG (ref 26–34)
MCHC RBC AUTO-ENTMCNC: 29.4 G/DL (ref 30–36.5)
MCV RBC AUTO: 78.3 FL (ref 80–99)
NRBC # BLD: 0 K/UL (ref 0–0.01)
NRBC BLD-RTO: 0 PER 100 WBC
PLATELET # BLD AUTO: 421 K/UL (ref 150–400)
PMV BLD AUTO: 10.1 FL (ref 8.9–12.9)
POTASSIUM SERPL-SCNC: 4.8 MMOL/L (ref 3.5–5.1)
PROT SERPL-MCNC: 7 G/DL (ref 6.4–8.2)
RBC # BLD AUTO: 4.38 M/UL (ref 3.8–5.2)
SODIUM SERPL-SCNC: 135 MMOL/L (ref 136–145)
T4 FREE SERPL-MCNC: 1.1 NG/DL (ref 0.8–1.5)
TRIGL SERPL-MCNC: 72 MG/DL
TSH SERPL DL<=0.05 MIU/L-ACNC: 2.25 UIU/ML (ref 0.36–3.74)
VLDLC SERPL CALC-MCNC: 14.4 MG/DL
WBC # BLD AUTO: 6.8 K/UL (ref 3.6–11)

## 2025-07-21 ENCOUNTER — PATIENT MESSAGE (OUTPATIENT)
Age: 89
End: 2025-07-21

## 2025-07-23 RX ORDER — FERROUS SULFATE 325(65) MG
325 TABLET ORAL
Qty: 90 TABLET | Refills: 1 | Status: SHIPPED | OUTPATIENT
Start: 2025-07-23

## 2025-07-23 NOTE — TELEPHONE ENCOUNTER
DANN    Called Patient. Name and  confirmed.  Informed her as per Dr. Levy's note. She stated that she does not have ride tomorrow. But she will call us back about lab appointment.

## 2025-07-24 ENCOUNTER — LAB (OUTPATIENT)
Age: 89
End: 2025-07-24

## 2025-07-24 DIAGNOSIS — D64.9 ANEMIA, UNSPECIFIED TYPE: ICD-10-CM

## 2025-07-25 ENCOUNTER — TELEPHONE (OUTPATIENT)
Age: 89
End: 2025-07-25

## 2025-07-25 ENCOUNTER — RESULTS FOLLOW-UP (OUTPATIENT)
Age: 89
End: 2025-07-25

## 2025-07-25 LAB
BASOPHILS # BLD: 0.06 K/UL (ref 0–0.1)
BASOPHILS NFR BLD: 0.9 % (ref 0–1)
DIFFERENTIAL METHOD BLD: ABNORMAL
EOSINOPHIL # BLD: 0.08 K/UL (ref 0–0.4)
EOSINOPHIL NFR BLD: 1.2 % (ref 0–7)
ERYTHROCYTE [DISTWIDTH] IN BLOOD BY AUTOMATED COUNT: 17.2 % (ref 11.5–14.5)
FERRITIN SERPL-MCNC: 14 NG/ML (ref 8–252)
HCT VFR BLD AUTO: 34.3 % (ref 35–47)
HGB BLD-MCNC: 10.2 G/DL (ref 11.5–16)
IMM GRANULOCYTES # BLD AUTO: 0.03 K/UL (ref 0–0.04)
IMM GRANULOCYTES NFR BLD AUTO: 0.5 % (ref 0–0.5)
IRON SATN MFR SERPL: 4 % (ref 20–50)
IRON SERPL-MCNC: 15 UG/DL (ref 35–150)
LYMPHOCYTES # BLD: 0.92 K/UL (ref 0.8–3.5)
LYMPHOCYTES NFR BLD: 14.3 % (ref 12–49)
MCH RBC QN AUTO: 22.9 PG (ref 26–34)
MCHC RBC AUTO-ENTMCNC: 29.7 G/DL (ref 30–36.5)
MCV RBC AUTO: 77.1 FL (ref 80–99)
MONOCYTES # BLD: 0.65 K/UL (ref 0–1)
MONOCYTES NFR BLD: 10.1 % (ref 5–13)
NEUTS SEG # BLD: 4.68 K/UL (ref 1.8–8)
NEUTS SEG NFR BLD: 73 % (ref 32–75)
NRBC # BLD: 0 K/UL (ref 0–0.01)
NRBC BLD-RTO: 0 PER 100 WBC
PLATELET # BLD AUTO: 422 K/UL (ref 150–400)
PMV BLD AUTO: 10.3 FL (ref 8.9–12.9)
RBC # BLD AUTO: 4.45 M/UL (ref 3.8–5.2)
TIBC SERPL-MCNC: 401 UG/DL (ref 250–450)
WBC # BLD AUTO: 6.4 K/UL (ref 3.6–11)

## 2025-08-08 ENCOUNTER — HOSPITAL ENCOUNTER (OUTPATIENT)
Facility: HOSPITAL | Age: 89
Discharge: HOME | End: 2025-08-10
Payer: MEDICARE

## 2025-08-08 VITALS — SYSTOLIC BLOOD PRESSURE: 164 MMHG | DIASTOLIC BLOOD PRESSURE: 72 MMHG

## 2025-08-08 DIAGNOSIS — R01.1 HEART MURMUR: ICD-10-CM

## 2025-08-08 LAB
ECHO AO ROOT DIAM: 3.3 CM
ECHO AR MAX VEL PISA: 4.8 M/S
ECHO AV AREA PEAK VELOCITY: 0.7 CM2
ECHO AV AREA VTI: 0.7 CM2
ECHO AV MEAN GRADIENT: 25 MMHG
ECHO AV MEAN VELOCITY: 2.4 M/S
ECHO AV PEAK GRADIENT: 43 MMHG
ECHO AV PEAK VELOCITY: 3.3 M/S
ECHO AV REGURGITANT PHT: 320 MS
ECHO AV VELOCITY RATIO: 0.3
ECHO AV VTI: 71 CM
ECHO EST RA PRESSURE: 3 MMHG
ECHO LA DIAMETER: 2.4 CM
ECHO LA TO AORTIC ROOT RATIO: 0.73
ECHO LA VOL A-L A2C: 72 ML (ref 22–52)
ECHO LA VOL A-L A4C: 73 ML (ref 22–52)
ECHO LA VOL BP: 71 ML (ref 22–52)
ECHO LA VOL MOD A2C: 70 ML (ref 22–52)
ECHO LA VOL MOD A4C: 69 ML (ref 22–52)
ECHO LA VOLUME AREA LENGTH: 75 ML
ECHO LV E' LATERAL VELOCITY: 6.77 CM/S
ECHO LV E' SEPTAL VELOCITY: 4.2 CM/S
ECHO LV EF PHYSICIAN: 60 %
ECHO LV FRACTIONAL SHORTENING: 28 % (ref 28–44)
ECHO LV INTERNAL DIMENSION DIASTOLIC: 3.2 CM (ref 3.9–5.3)
ECHO LV INTERNAL DIMENSION SYSTOLIC: 2.3 CM
ECHO LV IVSD: 1.3 CM (ref 0.6–0.9)
ECHO LV MASS 2D: 119.4 G (ref 67–162)
ECHO LV POSTERIOR WALL DIASTOLIC: 1.1 CM (ref 0.6–0.9)
ECHO LV RELATIVE WALL THICKNESS RATIO: 0.69
ECHO LVOT AREA: 2.3 CM2
ECHO LVOT AV VTI INDEX: 0.33
ECHO LVOT DIAM: 1.7 CM
ECHO LVOT MEAN GRADIENT: 2 MMHG
ECHO LVOT PEAK GRADIENT: 4 MMHG
ECHO LVOT PEAK VELOCITY: 1 M/S
ECHO LVOT SV: 52.6 ML
ECHO LVOT VTI: 23.2 CM
ECHO MV A VELOCITY: 1.58 M/S
ECHO MV AREA PHT: 2.9 CM2
ECHO MV AREA VTI: 1.4 CM2
ECHO MV E DECELERATION TIME (DT): 257.3 MS
ECHO MV E VELOCITY: 1.11 M/S
ECHO MV E/A RATIO: 0.7
ECHO MV E/E' LATERAL: 16.4
ECHO MV E/E' RATIO (AVERAGED): 21.41
ECHO MV E/E' SEPTAL: 26.43
ECHO MV LVOT VTI INDEX: 1.64
ECHO MV MAX VELOCITY: 1.6 M/S
ECHO MV MEAN GRADIENT: 4 MMHG
ECHO MV MEAN VELOCITY: 0.9 M/S
ECHO MV PEAK GRADIENT: 10 MMHG
ECHO MV PRESSURE HALF TIME (PHT): 74.6 MS
ECHO MV VTI: 38 CM
ECHO RA VOLUME: 35 ML
ECHO RA VOLUME: 35 ML
ECHO RIGHT VENTRICULAR SYSTOLIC PRESSURE (RVSP): 24 MMHG
ECHO RV FREE WALL PEAK S': 16.3 CM/S
ECHO RV TAPSE: 2.4 CM (ref 1.7–?)
ECHO TV REGURGITANT MAX VELOCITY: 2.27 M/S
ECHO TV REGURGITANT PEAK GRADIENT: 21 MMHG

## 2025-08-08 PROCEDURE — 93306 TTE W/DOPPLER COMPLETE: CPT

## 2025-08-08 PROCEDURE — 93306 TTE W/DOPPLER COMPLETE: CPT | Performed by: SPECIALIST

## 2025-08-22 ENCOUNTER — OFFICE VISIT (OUTPATIENT)
Age: 89
End: 2025-08-22
Payer: MEDICARE

## 2025-08-22 VITALS
RESPIRATION RATE: 18 BRPM | DIASTOLIC BLOOD PRESSURE: 68 MMHG | HEART RATE: 76 BPM | SYSTOLIC BLOOD PRESSURE: 130 MMHG | WEIGHT: 141.4 LBS | HEIGHT: 65 IN | OXYGEN SATURATION: 96 % | BODY MASS INDEX: 23.56 KG/M2 | TEMPERATURE: 96.9 F

## 2025-08-22 DIAGNOSIS — E03.9 ACQUIRED HYPOTHYROIDISM: ICD-10-CM

## 2025-08-22 DIAGNOSIS — D50.9 IRON DEFICIENCY ANEMIA, UNSPECIFIED IRON DEFICIENCY ANEMIA TYPE: Primary | ICD-10-CM

## 2025-08-22 DIAGNOSIS — R63.4 WEIGHT LOSS: ICD-10-CM

## 2025-08-22 DIAGNOSIS — N18.31 STAGE 3A CHRONIC KIDNEY DISEASE (HCC): ICD-10-CM

## 2025-08-22 LAB
ALBUMIN SERPL-MCNC: 3.5 G/DL (ref 3.5–5.2)
ALBUMIN/GLOB SERPL: 1.3 (ref 1.1–2.2)
ALP SERPL-CCNC: 78 U/L (ref 35–104)
ALT SERPL-CCNC: 10 U/L (ref 10–35)
ANION GAP SERPL CALC-SCNC: 11 MMOL/L (ref 2–14)
AST SERPL-CCNC: 18 U/L (ref 10–35)
BASOPHILS # BLD: 0.06 K/UL (ref 0–0.1)
BASOPHILS NFR BLD: 1 % (ref 0–1)
BILIRUB SERPL-MCNC: 0.4 MG/DL (ref 0–1.2)
BUN SERPL-MCNC: 18 MG/DL (ref 8–23)
BUN/CREAT SERPL: 17 (ref 12–20)
CALCIUM SERPL-MCNC: 9.4 MG/DL (ref 8.2–9.6)
CHLORIDE SERPL-SCNC: 98 MMOL/L (ref 98–107)
CO2 SERPL-SCNC: 23 MMOL/L (ref 20–29)
CREAT SERPL-MCNC: 1.06 MG/DL (ref 0.6–1)
DIFFERENTIAL METHOD BLD: ABNORMAL
EOSINOPHIL # BLD: 0.24 K/UL (ref 0–0.4)
EOSINOPHIL NFR BLD: 4 % (ref 0–7)
ERYTHROCYTE [DISTWIDTH] IN BLOOD BY AUTOMATED COUNT: 19.8 % (ref 11.5–14.5)
FERRITIN SERPL-MCNC: 26 NG/ML (ref 13–252)
GLOBULIN SER CALC-MCNC: 2.8 G/DL (ref 2–4)
GLUCOSE SERPL-MCNC: 82 MG/DL (ref 65–100)
HCT VFR BLD AUTO: 34.5 % (ref 35–47)
HGB BLD-MCNC: 10.6 G/DL (ref 11.5–16)
IMM GRANULOCYTES # BLD AUTO: 0 K/UL
IMM GRANULOCYTES NFR BLD AUTO: 0 %
IRON SATN MFR SERPL: 32 %
IRON SERPL-MCNC: 119 UG/DL (ref 37–145)
LYMPHOCYTES # BLD: 0.48 K/UL (ref 0.8–3.5)
LYMPHOCYTES NFR BLD: 8 % (ref 12–49)
MCH RBC QN AUTO: 23.7 PG (ref 26–34)
MCHC RBC AUTO-ENTMCNC: 30.7 G/DL (ref 30–36.5)
MCV RBC AUTO: 77 FL (ref 80–99)
MONOCYTES # BLD: 0.42 K/UL (ref 0–1)
MONOCYTES NFR BLD: 7 % (ref 5–13)
NEUTS BAND NFR BLD MANUAL: 1 % (ref 0–6)
NEUTS SEG # BLD: 4.8 K/UL (ref 1.8–8)
NEUTS SEG NFR BLD: 79 % (ref 32–75)
NRBC # BLD: 0 K/UL (ref 0–0.01)
NRBC BLD-RTO: 0 PER 100 WBC
PLATELET # BLD AUTO: 332 K/UL (ref 150–400)
PMV BLD AUTO: 9.4 FL (ref 8.9–12.9)
POTASSIUM SERPL-SCNC: 4.9 MMOL/L (ref 3.5–5.1)
PROT SERPL-MCNC: 6.3 G/DL (ref 6.4–8.3)
RBC # BLD AUTO: 4.48 M/UL (ref 3.8–5.2)
RBC MORPH BLD: ABNORMAL
RBC MORPH BLD: ABNORMAL
SODIUM SERPL-SCNC: 132 MMOL/L (ref 136–145)
T4 FREE SERPL-MCNC: 1.2 NG/DL (ref 0.9–1.6)
TIBC SERPL-MCNC: 376 UG/DL (ref 250–450)
TSH, 3RD GENERATION: 2.74 UIU/ML (ref 0.27–4.2)
UIBC SERPL-MCNC: 257 UG/DL (ref 112–347)
WBC # BLD AUTO: 6 K/UL (ref 3.6–11)

## 2025-08-22 PROCEDURE — 1123F ACP DISCUSS/DSCN MKR DOCD: CPT | Performed by: FAMILY MEDICINE

## 2025-08-22 PROCEDURE — 1090F PRES/ABSN URINE INCON ASSESS: CPT | Performed by: FAMILY MEDICINE

## 2025-08-22 PROCEDURE — 1159F MED LIST DOCD IN RCRD: CPT | Performed by: FAMILY MEDICINE

## 2025-08-22 PROCEDURE — G8420 CALC BMI NORM PARAMETERS: HCPCS | Performed by: FAMILY MEDICINE

## 2025-08-22 PROCEDURE — 1126F AMNT PAIN NOTED NONE PRSNT: CPT | Performed by: FAMILY MEDICINE

## 2025-08-22 PROCEDURE — 99214 OFFICE O/P EST MOD 30 MIN: CPT | Performed by: FAMILY MEDICINE

## 2025-08-22 PROCEDURE — 1036F TOBACCO NON-USER: CPT | Performed by: FAMILY MEDICINE

## 2025-08-22 PROCEDURE — G2211 COMPLEX E/M VISIT ADD ON: HCPCS | Performed by: FAMILY MEDICINE

## 2025-08-22 PROCEDURE — G8427 DOCREV CUR MEDS BY ELIG CLIN: HCPCS | Performed by: FAMILY MEDICINE

## 2025-08-22 ASSESSMENT — PATIENT HEALTH QUESTIONNAIRE - PHQ9
2. FEELING DOWN, DEPRESSED OR HOPELESS: NOT AT ALL
1. LITTLE INTEREST OR PLEASURE IN DOING THINGS: NOT AT ALL
SUM OF ALL RESPONSES TO PHQ QUESTIONS 1-9: 0

## 2025-08-31 RX ORDER — UREA 10 %
45 LOTION (ML) TOPICAL
Qty: 90 TABLET | Refills: 1 | Status: SHIPPED | OUTPATIENT
Start: 2025-08-31

## 2025-09-03 ENCOUNTER — PATIENT MESSAGE (OUTPATIENT)
Age: 89
End: 2025-09-03